# Patient Record
Sex: FEMALE | Race: BLACK OR AFRICAN AMERICAN | Employment: OTHER | URBAN - METROPOLITAN AREA
[De-identification: names, ages, dates, MRNs, and addresses within clinical notes are randomized per-mention and may not be internally consistent; named-entity substitution may affect disease eponyms.]

---

## 2017-07-31 PROBLEM — F33.9 RECURRENT MAJOR DEPRESSIVE DISORDER (HCC): Status: ACTIVE | Noted: 2017-07-31

## 2017-07-31 PROBLEM — F41.1 GENERALIZED ANXIETY DISORDER: Status: ACTIVE | Noted: 2017-07-31

## 2017-08-01 PROBLEM — K76.89 LIVER CYST: Status: ACTIVE | Noted: 2017-08-01

## 2017-08-31 ENCOUNTER — HOSPITAL ENCOUNTER (OUTPATIENT)
Dept: LAB | Age: 62
Discharge: HOME | End: 2017-08-31

## 2017-08-31 PROCEDURE — 88305 TISSUE EXAM BY PATHOLOGIST: CPT | Performed by: INTERNAL MEDICINE

## 2017-08-31 PROCEDURE — 88312 SPECIAL STAINS GROUP 1: CPT | Performed by: INTERNAL MEDICINE

## 2017-10-02 ENCOUNTER — HOSPITAL ENCOUNTER (OUTPATIENT)
Dept: LAB | Age: 62
Discharge: HOME | End: 2017-10-02
Attending: SURGERY
Payer: MEDICARE

## 2017-10-02 LAB
ALBUMIN SERPL-MCNC: 3.7 G/DL (ref 3.2–4.6)
ALBUMIN/GLOB SERPL: 1.1 {RATIO} (ref 1.2–3.5)
ALP SERPL-CCNC: 105 U/L (ref 50–136)
ALT SERPL-CCNC: 34 U/L (ref 12–65)
ANION GAP SERPL CALC-SCNC: 5 MMOL/L (ref 7–16)
APTT PPP: 28 SEC (ref 23.5–31.7)
AST SERPL-CCNC: 26 U/L (ref 15–37)
BILIRUB SERPL-MCNC: 0.8 MG/DL (ref 0.2–1.1)
BUN SERPL-MCNC: 9 MG/DL (ref 8–23)
CALCIUM SERPL-MCNC: 9.1 MG/DL (ref 8.3–10.4)
CHLORIDE SERPL-SCNC: 101 MMOL/L (ref 98–107)
CO2 SERPL-SCNC: 33 MMOL/L (ref 21–32)
CREAT SERPL-MCNC: 1.02 MG/DL (ref 0.6–1)
ERYTHROCYTE [DISTWIDTH] IN BLOOD BY AUTOMATED COUNT: 14.7 % (ref 11.9–14.6)
EST. AVERAGE GLUCOSE BLD GHB EST-MCNC: 114 MG/DL
FERRITIN SERPL-MCNC: 114 NG/ML (ref 8–388)
FOLATE SERPL-MCNC: 17.5 NG/ML (ref 3.1–17.5)
GLOBULIN SER CALC-MCNC: 3.5 G/DL (ref 2.3–3.5)
GLUCOSE SERPL-MCNC: 89 MG/DL (ref 65–100)
HBA1C MFR BLD: 5.6 % (ref 4.8–6)
HCT VFR BLD AUTO: 38.1 % (ref 35.8–46.3)
HGB BLD-MCNC: 12 G/DL (ref 11.7–15.4)
INR PPP: 1 (ref 0.9–1.2)
IRON SATN MFR SERPL: 12 %
IRON SERPL-MCNC: 36 UG/DL (ref 35–150)
MCH RBC QN AUTO: 25.4 PG (ref 26.1–32.9)
MCHC RBC AUTO-ENTMCNC: 31.5 G/DL (ref 31.4–35)
MCV RBC AUTO: 80.5 FL (ref 79.6–97.8)
PLATELET # BLD AUTO: 296 K/UL (ref 150–450)
PMV BLD AUTO: 9.7 FL (ref 10.8–14.1)
POTASSIUM SERPL-SCNC: 4 MMOL/L (ref 3.5–5.1)
PROT SERPL-MCNC: 7.2 G/DL (ref 6.3–8.2)
PROTHROMBIN TIME: 10.6 SEC (ref 9.6–12)
RBC # BLD AUTO: 4.73 M/UL (ref 4.05–5.25)
SODIUM SERPL-SCNC: 139 MMOL/L (ref 136–145)
TIBC SERPL-MCNC: 296 UG/DL (ref 250–450)
TSH SERPL DL<=0.005 MIU/L-ACNC: 1.05 UIU/ML (ref 0.36–3.74)
VIT B12 SERPL-MCNC: 3268 PG/ML (ref 193–986)
WBC # BLD AUTO: 8.9 K/UL (ref 4.3–11.1)

## 2017-10-02 PROCEDURE — 86677 HELICOBACTER PYLORI ANTIBODY: CPT | Performed by: SURGERY

## 2017-10-02 PROCEDURE — 85027 COMPLETE CBC AUTOMATED: CPT | Performed by: SURGERY

## 2017-10-02 PROCEDURE — 82652 VIT D 1 25-DIHYDROXY: CPT | Performed by: SURGERY

## 2017-10-02 PROCEDURE — 82607 VITAMIN B-12: CPT | Performed by: SURGERY

## 2017-10-02 PROCEDURE — 80323 ALKALOIDS NOS: CPT | Performed by: SURGERY

## 2017-10-02 PROCEDURE — 82728 ASSAY OF FERRITIN: CPT | Performed by: SURGERY

## 2017-10-02 PROCEDURE — 85730 THROMBOPLASTIN TIME PARTIAL: CPT | Performed by: SURGERY

## 2017-10-02 PROCEDURE — 82746 ASSAY OF FOLIC ACID SERUM: CPT | Performed by: SURGERY

## 2017-10-02 PROCEDURE — 80053 COMPREHEN METABOLIC PANEL: CPT | Performed by: SURGERY

## 2017-10-02 PROCEDURE — 36415 COLL VENOUS BLD VENIPUNCTURE: CPT | Performed by: SURGERY

## 2017-10-02 PROCEDURE — 84425 ASSAY OF VITAMIN B-1: CPT | Performed by: SURGERY

## 2017-10-02 PROCEDURE — 83540 ASSAY OF IRON: CPT | Performed by: SURGERY

## 2017-10-02 PROCEDURE — 83036 HEMOGLOBIN GLYCOSYLATED A1C: CPT | Performed by: SURGERY

## 2017-10-02 PROCEDURE — 85610 PROTHROMBIN TIME: CPT | Performed by: SURGERY

## 2017-10-02 PROCEDURE — 84443 ASSAY THYROID STIM HORMONE: CPT | Performed by: SURGERY

## 2017-10-04 LAB
1,25(OH)2D3 SERPL-MCNC: 48.9 PG/ML (ref 19.9–79.3)
H PYLORI AB SER QL: NEGATIVE

## 2017-10-05 LAB — VIT B1 BLD-SCNC: 130.8 NMOL/L (ref 66.5–200)

## 2017-10-08 LAB
COTININE SERPL-MCNC: NORMAL NG/ML
NICOTINE SERPL-MCNC: NORMAL NG/ML

## 2017-10-30 ENCOUNTER — HOSPITAL ENCOUNTER (OUTPATIENT)
Dept: GENERAL RADIOLOGY | Age: 62
Discharge: HOME | End: 2017-10-30
Attending: SURGERY
Payer: MEDICARE

## 2017-10-30 DIAGNOSIS — K25.9 GASTRIC ULCER, UNSPECIFIED CHRONICITY, UNSPECIFIED WHETHER GASTRIC ULCER HEMORRHAGE OR PERFORATION PRESENT: ICD-10-CM

## 2017-10-30 DIAGNOSIS — K21.9 HIATAL HERNIA WITH GERD: ICD-10-CM

## 2017-10-30 DIAGNOSIS — K44.9 HIATAL HERNIA WITH GERD: ICD-10-CM

## 2017-10-30 PROCEDURE — 74011000250 HC RX REV CODE- 250: Performed by: SURGERY

## 2017-10-30 PROCEDURE — 74011000255 HC RX REV CODE- 255: Performed by: SURGERY

## 2017-10-30 PROCEDURE — 74247 XR UPPER GI W KUB AIR CONT: CPT

## 2017-10-30 RX ADMIN — BARIUM SULFATE 355 ML: 0.6 SUSPENSION ORAL at 09:00

## 2017-10-30 RX ADMIN — ANTACID/ANTIFLATULENT 8 G: 380; 550; 10; 10 GRANULE, EFFERVESCENT ORAL at 08:54

## 2017-10-30 RX ADMIN — BARIUM SULFATE 700 MG: 700 TABLET ORAL at 09:02

## 2017-10-30 RX ADMIN — BARIUM SULFATE 135 ML: 980 POWDER, FOR SUSPENSION ORAL at 08:54

## 2017-11-02 ENCOUNTER — HOSPITAL ENCOUNTER (OUTPATIENT)
Dept: LAB | Age: 62
Discharge: HOME | End: 2017-11-02

## 2017-11-02 PROCEDURE — 88305 TISSUE EXAM BY PATHOLOGIST: CPT | Performed by: INTERNAL MEDICINE

## 2018-04-06 ENCOUNTER — HOSPITAL ENCOUNTER (OUTPATIENT)
Dept: SURGERY | Age: 63
Discharge: HOME | End: 2018-04-06
Attending: INTERNAL MEDICINE
Payer: MEDICARE

## 2018-04-06 VITALS
WEIGHT: 243.25 LBS | RESPIRATION RATE: 16 BRPM | OXYGEN SATURATION: 97 % | SYSTOLIC BLOOD PRESSURE: 119 MMHG | HEART RATE: 94 BPM | BODY MASS INDEX: 39.09 KG/M2 | TEMPERATURE: 97.5 F | HEIGHT: 66 IN | DIASTOLIC BLOOD PRESSURE: 68 MMHG

## 2018-04-06 LAB
ALBUMIN SERPL-MCNC: 3.6 G/DL (ref 3.2–4.6)
ALBUMIN/GLOB SERPL: 0.8 {RATIO} (ref 1.2–3.5)
ALP SERPL-CCNC: 92 U/L (ref 50–136)
ALT SERPL-CCNC: 26 U/L (ref 12–65)
ANION GAP SERPL CALC-SCNC: 10 MMOL/L (ref 7–16)
AST SERPL-CCNC: 28 U/L (ref 15–37)
BILIRUB SERPL-MCNC: 1.1 MG/DL (ref 0.2–1.1)
BUN SERPL-MCNC: 15 MG/DL (ref 8–23)
CALCIUM SERPL-MCNC: 9.2 MG/DL (ref 8.3–10.4)
CHLORIDE SERPL-SCNC: 103 MMOL/L (ref 98–107)
CO2 SERPL-SCNC: 28 MMOL/L (ref 21–32)
CREAT SERPL-MCNC: 1.07 MG/DL (ref 0.6–1)
ERYTHROCYTE [DISTWIDTH] IN BLOOD BY AUTOMATED COUNT: 14.8 % (ref 11.9–14.6)
EST. AVERAGE GLUCOSE BLD GHB EST-MCNC: 103 MG/DL
GLOBULIN SER CALC-MCNC: 4.4 G/DL (ref 2.3–3.5)
GLUCOSE SERPL-MCNC: 98 MG/DL (ref 65–100)
HBA1C MFR BLD: 5.2 % (ref 4.8–6)
HCT VFR BLD AUTO: 39.1 % (ref 35.8–46.3)
HGB BLD-MCNC: 12.3 G/DL (ref 11.7–15.4)
MCH RBC QN AUTO: 25.1 PG (ref 26.1–32.9)
MCHC RBC AUTO-ENTMCNC: 31.5 G/DL (ref 31.4–35)
MCV RBC AUTO: 79.8 FL (ref 79.6–97.8)
PLATELET # BLD AUTO: 294 K/UL (ref 150–450)
PMV BLD AUTO: 9.1 FL (ref 10.8–14.1)
POTASSIUM SERPL-SCNC: 3.9 MMOL/L (ref 3.5–5.1)
PROT SERPL-MCNC: 8 G/DL (ref 6.3–8.2)
RBC # BLD AUTO: 4.9 M/UL (ref 4.05–5.25)
SODIUM SERPL-SCNC: 141 MMOL/L (ref 136–145)
WBC # BLD AUTO: 8.1 K/UL (ref 4.3–11.1)

## 2018-04-06 PROCEDURE — 85027 COMPLETE CBC AUTOMATED: CPT | Performed by: SURGERY

## 2018-04-06 PROCEDURE — 83036 HEMOGLOBIN GLYCOSYLATED A1C: CPT | Performed by: SURGERY

## 2018-04-06 PROCEDURE — 80053 COMPREHEN METABOLIC PANEL: CPT | Performed by: SURGERY

## 2018-04-06 RX ORDER — BUDESONIDE AND FORMOTEROL FUMARATE DIHYDRATE 160; 4.5 UG/1; UG/1
AEROSOL RESPIRATORY (INHALATION)
Refills: 11 | COMMUNITY
Start: 2018-02-06 | End: 2019-05-06 | Stop reason: ALTCHOICE

## 2018-04-06 NOTE — PERIOP NOTES
Patient verified name, , and surgery as listed in [facility]. Type 2 surgery, walk in assessment complete. Labs per surgeon: cbc, cmp, Hgb A1c; results pending  Labs per anesthesia protocol: none;  EKG: none needed per protocol    Hibiclens and instructions given per hospital policy. Patient provided with and instructed on educational handouts including Guide to Surgery, Pain Management, Hand Hygiene, Blood Transfusion Education, and [facility] Brochure. Patient answered medical/surgical history questions at their best of ability. All prior to admission medications documented in [facility]. Original medication prescription bottle NOT visualized during patient appointment. Patient instructed to hold all vitamins 7 days prior to surgery and NSAIDS 5 days prior to surgery, patient verbalized understanding. Medications to be held: Aspirin and Diclofenac to be held for 5 days prior to surgery. Patient instructed to continue previous medications as prescribed prior to surgery and to take the following medications the day of surgery according to anesthesia guidelines with a small sip of water: use/bring Symbicort inhaler; take Hydroxyzine, if needed; Xanax, Cymbalta, Nexium, Claritin, Singulair, Venlafaxine. Patient reminded to bring non formulary Nexium. .     Patient teach back successful and patient demonstrates knowledge of instructions.

## 2018-04-12 ENCOUNTER — ANESTHESIA EVENT (OUTPATIENT)
Dept: SURGERY | Age: 63
End: 2018-04-12
Payer: MEDICARE

## 2018-04-13 ENCOUNTER — APPOINTMENT (OUTPATIENT)
Dept: INTERVENTIONAL RADIOLOGY/VASCULAR | Age: 63
End: 2018-04-13
Attending: SURGERY
Payer: MEDICARE

## 2018-04-13 ENCOUNTER — ANESTHESIA (OUTPATIENT)
Dept: SURGERY | Age: 63
End: 2018-04-13
Payer: MEDICARE

## 2018-04-13 ENCOUNTER — HOSPITAL ENCOUNTER (OUTPATIENT)
Age: 63
Setting detail: OUTPATIENT SURGERY
Discharge: HOME | End: 2018-04-13
Attending: SURGERY | Admitting: SURGERY
Payer: MEDICARE

## 2018-04-13 VITALS
RESPIRATION RATE: 16 BRPM | HEIGHT: 66 IN | SYSTOLIC BLOOD PRESSURE: 115 MMHG | WEIGHT: 246 LBS | OXYGEN SATURATION: 93 % | TEMPERATURE: 97.8 F | BODY MASS INDEX: 39.53 KG/M2 | DIASTOLIC BLOOD PRESSURE: 66 MMHG | HEART RATE: 83 BPM

## 2018-04-13 PROCEDURE — C1894 INTRO/SHEATH, NON-LASER: HCPCS

## 2018-04-13 PROCEDURE — C1769 GUIDE WIRE: HCPCS

## 2018-04-13 PROCEDURE — 76210000021 HC REC RM PH II 0.5 TO 1 HR: Performed by: SURGERY

## 2018-04-13 PROCEDURE — 76210000006 HC OR PH I REC 0.5 TO 1 HR: Performed by: SURGERY

## 2018-04-13 PROCEDURE — 76010000149 HC OR TIME 1 TO 1.5 HR: Performed by: SURGERY

## 2018-04-13 PROCEDURE — 74011250636 HC RX REV CODE- 250/636: Performed by: ANESTHESIOLOGY

## 2018-04-13 PROCEDURE — C1880 VENA CAVA FILTER: HCPCS

## 2018-04-13 PROCEDURE — C1887 CATHETER, GUIDING: HCPCS

## 2018-04-13 PROCEDURE — 76937 US GUIDE VASCULAR ACCESS: CPT

## 2018-04-13 PROCEDURE — 74011000250 HC RX REV CODE- 250

## 2018-04-13 PROCEDURE — 74011250636 HC RX REV CODE- 250/636: Performed by: SURGERY

## 2018-04-13 PROCEDURE — 76060000033 HC ANESTHESIA 1 TO 1.5 HR: Performed by: SURGERY

## 2018-04-13 PROCEDURE — 74011250636 HC RX REV CODE- 250/636

## 2018-04-13 PROCEDURE — 74011250637 HC RX REV CODE- 250/637: Performed by: ANESTHESIOLOGY

## 2018-04-13 PROCEDURE — 74011636320 HC RX REV CODE- 636/320: Performed by: SURGERY

## 2018-04-13 DEVICE — CELECT PLATINUM VENA CAVA FILTER SET FOR FEMORAL AND JUGULAR VEIN APPROACH
Type: IMPLANTABLE DEVICE | Site: VENA CAVA | Status: FUNCTIONAL
Brand: COOK CELECT

## 2018-04-13 RX ORDER — SODIUM CHLORIDE, SODIUM LACTATE, POTASSIUM CHLORIDE, CALCIUM CHLORIDE 600; 310; 30; 20 MG/100ML; MG/100ML; MG/100ML; MG/100ML
100 INJECTION, SOLUTION INTRAVENOUS CONTINUOUS
Status: DISCONTINUED | OUTPATIENT
Start: 2018-04-13 | End: 2018-04-13 | Stop reason: HOSPADM

## 2018-04-13 RX ORDER — ONDANSETRON 2 MG/ML
INJECTION INTRAMUSCULAR; INTRAVENOUS AS NEEDED
Status: DISCONTINUED | OUTPATIENT
Start: 2018-04-13 | End: 2018-04-13 | Stop reason: HOSPADM

## 2018-04-13 RX ORDER — LIDOCAINE HYDROCHLORIDE 10 MG/ML
0.1 INJECTION INFILTRATION; PERINEURAL AS NEEDED
Status: DISCONTINUED | OUTPATIENT
Start: 2018-04-13 | End: 2018-04-13 | Stop reason: HOSPADM

## 2018-04-13 RX ORDER — CEFAZOLIN SODIUM/WATER 2 G/20 ML
2 SYRINGE (ML) INTRAVENOUS ONCE
Status: COMPLETED | OUTPATIENT
Start: 2018-04-13 | End: 2018-04-13

## 2018-04-13 RX ORDER — HYDROCODONE BITARTRATE AND ACETAMINOPHEN 7.5; 325 MG/1; MG/1
1 TABLET ORAL AS NEEDED
Status: DISCONTINUED | OUTPATIENT
Start: 2018-04-13 | End: 2018-04-13 | Stop reason: HOSPADM

## 2018-04-13 RX ORDER — SUCCINYLCHOLINE CHLORIDE 20 MG/ML
INJECTION INTRAMUSCULAR; INTRAVENOUS AS NEEDED
Status: DISCONTINUED | OUTPATIENT
Start: 2018-04-13 | End: 2018-04-13 | Stop reason: HOSPADM

## 2018-04-13 RX ORDER — FENTANYL CITRATE 50 UG/ML
100 INJECTION, SOLUTION INTRAMUSCULAR; INTRAVENOUS ONCE
Status: DISCONTINUED | OUTPATIENT
Start: 2018-04-13 | End: 2018-04-13 | Stop reason: HOSPADM

## 2018-04-13 RX ORDER — PROPOFOL 10 MG/ML
INJECTION, EMULSION INTRAVENOUS AS NEEDED
Status: DISCONTINUED | OUTPATIENT
Start: 2018-04-13 | End: 2018-04-13 | Stop reason: HOSPADM

## 2018-04-13 RX ORDER — ROCURONIUM BROMIDE 10 MG/ML
INJECTION, SOLUTION INTRAVENOUS AS NEEDED
Status: DISCONTINUED | OUTPATIENT
Start: 2018-04-13 | End: 2018-04-13 | Stop reason: HOSPADM

## 2018-04-13 RX ORDER — SODIUM CHLORIDE 0.9 % (FLUSH) 0.9 %
5-10 SYRINGE (ML) INJECTION EVERY 8 HOURS
Status: DISCONTINUED | OUTPATIENT
Start: 2018-04-13 | End: 2018-04-13 | Stop reason: HOSPADM

## 2018-04-13 RX ORDER — NALOXONE HYDROCHLORIDE 0.4 MG/ML
0.1 INJECTION, SOLUTION INTRAMUSCULAR; INTRAVENOUS; SUBCUTANEOUS AS NEEDED
Status: DISCONTINUED | OUTPATIENT
Start: 2018-04-13 | End: 2018-04-13 | Stop reason: HOSPADM

## 2018-04-13 RX ORDER — OXYCODONE HYDROCHLORIDE 5 MG/1
5 TABLET ORAL
Status: DISCONTINUED | OUTPATIENT
Start: 2018-04-13 | End: 2018-04-13 | Stop reason: HOSPADM

## 2018-04-13 RX ORDER — MIDAZOLAM HYDROCHLORIDE 1 MG/ML
2 INJECTION, SOLUTION INTRAMUSCULAR; INTRAVENOUS
Status: DISCONTINUED | OUTPATIENT
Start: 2018-04-13 | End: 2018-04-13 | Stop reason: HOSPADM

## 2018-04-13 RX ORDER — HEPARIN SODIUM 5000 [USP'U]/ML
INJECTION, SOLUTION INTRAVENOUS; SUBCUTANEOUS AS NEEDED
Status: DISCONTINUED | OUTPATIENT
Start: 2018-04-13 | End: 2018-04-13 | Stop reason: HOSPADM

## 2018-04-13 RX ORDER — SODIUM CHLORIDE 9 MG/ML
25 INJECTION, SOLUTION INTRAVENOUS CONTINUOUS
Status: DISCONTINUED | OUTPATIENT
Start: 2018-04-13 | End: 2018-04-13 | Stop reason: HOSPADM

## 2018-04-13 RX ORDER — FENTANYL CITRATE 50 UG/ML
INJECTION, SOLUTION INTRAMUSCULAR; INTRAVENOUS AS NEEDED
Status: DISCONTINUED | OUTPATIENT
Start: 2018-04-13 | End: 2018-04-13 | Stop reason: HOSPADM

## 2018-04-13 RX ORDER — LIDOCAINE HYDROCHLORIDE 20 MG/ML
INJECTION, SOLUTION EPIDURAL; INFILTRATION; INTRACAUDAL; PERINEURAL AS NEEDED
Status: DISCONTINUED | OUTPATIENT
Start: 2018-04-13 | End: 2018-04-13 | Stop reason: HOSPADM

## 2018-04-13 RX ORDER — DEXAMETHASONE SODIUM PHOSPHATE 4 MG/ML
INJECTION, SOLUTION INTRA-ARTICULAR; INTRALESIONAL; INTRAMUSCULAR; INTRAVENOUS; SOFT TISSUE AS NEEDED
Status: DISCONTINUED | OUTPATIENT
Start: 2018-04-13 | End: 2018-04-13 | Stop reason: HOSPADM

## 2018-04-13 RX ORDER — HYDROMORPHONE HYDROCHLORIDE 2 MG/ML
0.5 INJECTION, SOLUTION INTRAMUSCULAR; INTRAVENOUS; SUBCUTANEOUS
Status: DISCONTINUED | OUTPATIENT
Start: 2018-04-13 | End: 2018-04-13 | Stop reason: HOSPADM

## 2018-04-13 RX ORDER — SODIUM CHLORIDE 0.9 % (FLUSH) 0.9 %
5-10 SYRINGE (ML) INJECTION AS NEEDED
Status: DISCONTINUED | OUTPATIENT
Start: 2018-04-13 | End: 2018-04-13 | Stop reason: HOSPADM

## 2018-04-13 RX ORDER — FAMOTIDINE 20 MG/1
20 TABLET, FILM COATED ORAL ONCE
Status: COMPLETED | OUTPATIENT
Start: 2018-04-13 | End: 2018-04-13

## 2018-04-13 RX ADMIN — HYDROMORPHONE HYDROCHLORIDE 0.5 MG: 2 INJECTION, SOLUTION INTRAMUSCULAR; INTRAVENOUS; SUBCUTANEOUS at 12:53

## 2018-04-13 RX ADMIN — FENTANYL CITRATE 100 MCG: 50 INJECTION, SOLUTION INTRAMUSCULAR; INTRAVENOUS at 11:37

## 2018-04-13 RX ADMIN — OXYCODONE HYDROCHLORIDE 5 MG: 5 TABLET ORAL at 14:18

## 2018-04-13 RX ADMIN — SODIUM CHLORIDE, SODIUM LACTATE, POTASSIUM CHLORIDE, AND CALCIUM CHLORIDE 100 ML/HR: 600; 310; 30; 20 INJECTION, SOLUTION INTRAVENOUS at 09:43

## 2018-04-13 RX ADMIN — LIDOCAINE HYDROCHLORIDE 60 MG: 20 INJECTION, SOLUTION EPIDURAL; INFILTRATION; INTRACAUDAL; PERINEURAL at 11:37

## 2018-04-13 RX ADMIN — FAMOTIDINE 20 MG: 20 TABLET ORAL at 09:44

## 2018-04-13 RX ADMIN — SUCCINYLCHOLINE CHLORIDE 140 MG: 20 INJECTION INTRAMUSCULAR; INTRAVENOUS at 11:37

## 2018-04-13 RX ADMIN — HYDROMORPHONE HYDROCHLORIDE 0.5 MG: 2 INJECTION, SOLUTION INTRAMUSCULAR; INTRAVENOUS; SUBCUTANEOUS at 13:01

## 2018-04-13 RX ADMIN — DEXAMETHASONE SODIUM PHOSPHATE 4 MG: 4 INJECTION, SOLUTION INTRA-ARTICULAR; INTRALESIONAL; INTRAMUSCULAR; INTRAVENOUS; SOFT TISSUE at 11:58

## 2018-04-13 RX ADMIN — ONDANSETRON 4 MG: 2 INJECTION INTRAMUSCULAR; INTRAVENOUS at 11:58

## 2018-04-13 RX ADMIN — PROPOFOL 200 MG: 10 INJECTION, EMULSION INTRAVENOUS at 11:37

## 2018-04-13 RX ADMIN — MIDAZOLAM HYDROCHLORIDE 2 MG: 1 INJECTION, SOLUTION INTRAMUSCULAR; INTRAVENOUS at 10:57

## 2018-04-13 RX ADMIN — Medication 2 G: at 11:44

## 2018-04-13 RX ADMIN — ROCURONIUM BROMIDE 5 MG: 10 INJECTION, SOLUTION INTRAVENOUS at 11:37

## 2018-04-13 NOTE — DISCHARGE INSTRUCTIONS
Vena Cava Filter Placement: What to Expect at [address] [facility]    A vena cava filter was put into the vena cava using a thin, flexible tube (catheter) that was inserted through a vein in your neck or groin. A vena cava filter helps prevent blood clots from traveling to the lungs and heart, where they may block blood flow. The filter may be permanent or it may be removed later. Vena cava filters may be used if you have problems taking a medicine (called a blood thinner) that prevents blood clots. After having a vena cava filter placed, you may feel tired and have some pain for several days. You may have a small bandage where the catheter was placed. This care sheet gives you a general idea about how long it will take for you to recover. But each person recovers at a different pace. Follow the steps below to feel better as quickly as possible. How can you care for yourself at home? Activity  ? · Take it easy for a day or two. Avoid strenuous activities, such as bicycle riding, jogging, weight lifting, or aerobic exercise, until your doctor says it is okay. Diet  ? · You can eat your normal diet. If your stomach is upset, try bland, low-fat foods like plain rice, broiled chicken, toast, and yogurt. ? · Drink plenty of fluids to avoid becoming dehydrated. Medicines  ? · Your doctor will tell you if and when you can restart your medicines. He or she will also give you instructions about taking any new medicines. ? · If you take blood thinners, such as warfarin (Coumadin), clopidogrel (Plavix), or aspirin, be sure to talk to your doctor. He or she will tell you if and when to start taking those medicines again. Make sure that you understand exactly what your doctor wants you to do. ? · Be safe with medicines. Take pain medicines exactly as directed. ¨ If the doctor gave you a prescription medicine for pain, take it as prescribed.   ¨ If you are not taking a prescription pain medicine, ask your doctor if you can take an over-the-counter medicine. ? · If you think your pain medicine is making you sick to your stomach:  ¨ Take your medicine after meals (unless your doctor has told you not to). ¨ Ask your doctor for a different pain medicine. ?Care of the catheter site  ? · Keep a bandage over the spot where the catheter was inserted for the first day, or for as long as your doctor recommends. ? · Put ice or a cold pack on the area for 10 to 20 minutes at a time to help with soreness or swelling. Do this every few hours. Put a thin cloth between the ice and your skin. Follow-up care is a key part of your treatment and safety. Be sure to make and go to all appointments, and call your doctor if you are having problems. It's also a good idea to know your test results and keep a list of the medicines you take. When should you call for help? Call 911 anytime you think you may need emergency care. For example, call if:  ? · You passed out (lost consciousness). ? · You have severe trouble breathing. ? · You have sudden chest pain and shortness of breath, or you cough up blood. ?Call your doctor now or seek immediate medical care if:  ? · You have pain that does not get better after you take pain medicine. ? · You are bleeding through your dressing. A small amount of bleeding is normal.   ? · You have a fast-growing, painful lump at the catheter site. ? · You have signs of infection, such as:  ¨ Increased pain, swelling, warmth, or redness. ¨ Red streaks leading from the incision. ¨ Pus draining from the incision. ¨ A fever. ? · You have symptoms of a blood clot in your leg, such as:  ¨ Pain in the calf, back of the knee, thigh, or groin. ¨ Redness and swelling in your leg or groin. ? Watch closely for any changes in your health, and be sure to contact your doctor if you have any problems.   After general anesthesia or intravenous sedation, for 24 hours or while taking prescription Narcotics:  · Limit your activities  · Do not drive and operate hazardous machinery  · Do not make important personal or business decisions  · Do  not drink alcoholic beverages  · If you have not urinated within 8 hours after discharge, please contact your surgeon on call. *  Please give a list of your current medications to your Primary Care Provider. *  Please update this list whenever your medications are discontinued, doses are      changed, or new medications (including over-the-counter products) are added. *  Please carry medication information at all times in case of emergency situations. These are general instructions for a healthy lifestyle:  No smoking/ No tobacco products/ Avoid exposure to second hand smoke  Surgeon General's Warning:  Quitting smoking now greatly reduces serious risk to your health. Obesity, smoking, and sedentary lifestyle greatly increases your risk for illness  A healthy diet, regular physical exercise & weight monitoring are important for maintaining a healthy lifestyle    You may be retaining fluid if you have a history of heart failure or if you experience any of the following symptoms:  Weight gain of 3 pounds or more overnight or 5 pounds in a week, increased swelling in our hands or feet or shortness of breath while lying flat in bed. Please call your doctor as soon as you notice any of these symptoms; do not wait until your next office visit. Recognize signs and symptoms of STROKE:  F-face looks uneven  A-arms unable to move or move unevenly  S-speech slurred or non-existent  T-time-call 911 as soon as signs and symptoms begin-DO NOT go       Back to bed or wait to see if you get better-TIME IS BRAIN.

## 2018-04-13 NOTE — IP AVS SNAPSHOT
[address] [address] [place] [address] 
[phone] Patient: Evita Lr MRN: [id number] About your hospitalization You were admitted on:  April 13, 2018 You last received care in the:  [facility] PACU You were discharged on:  April 13, 2018 Why you were hospitalized Your primary diagnosis was:  Not on File Follow-up Information Follow up With Details Comments Contact Info Reynaldo Colindres DO   [address] [facility] [phone] Sunny Sky MD  Please call and see if you need to schedule a follow-up appointment. [address] [facility] [address] 
[phone] Your Scheduled Appointments Tuesday April 17, 2018 GASTRECTOMY SLEEVE LAPAROSCOPIC with Heather Valdez MD  
[facility] ([address]) [address] [place] [address] [address] [place] [address]  
[phone] Thursday April 26, 2018  2:50 PM EDT  
GPO BAR with Heather Valdez MD  
[facility] ([address] [place] [address]) [address] [place] [id number] [phone] Monday May 07, 2018 11:00 AM EDT  
SFD PHONE ASSESSMENT with SFD PHONE APPOINTMENT [facility] Pre Assessment [facility] OR PRE ASSESSMENT) [address] [place] [address]  
[phone] Date/time displayed does not reflect when your phone assessment will be completed. Monday May 14, 2018 VENA CAVA FILTER INSERTION with Sunny Sky MD  
[facility] ([address] [place] [address]) [address]  
[phone] Wednesday May 16, 2018 10:00 AM EDT SHORT with Dilan Lucero MD  
[address]  
[phone] Thursday May 31, 2018  8:45 AM EDT SHORT with Benedicto Larkin NP  
 [address] [place] [address] [place] [address]  
[phone] Discharge Orders None A check josh indicates which time of day the medication should be taken. My Medications CHANGE how you take these medications Instructions Each Dose to Equal  
 Morning Noon Evening Bedtime  
 alendronate 70 mg tablet Commonly known as:  FOSAMAX What changed:  additional instructions Your last dose was: Your next dose is: Take 1 Tab by mouth every seven (7) days. Indications: POST-MENOPAUSAL OSTEOPOROSIS  
 70 mg  
    
   
   
   
  
 ALPRAZolam 1 mg tablet Commonly known as:  Leno Villareal What changed:  additional instructions Your last dose was: Your next dose is: Take 1 Tab by mouth two (2) times a day. Max Daily Amount: 2 mg. PER MH  Indications: anxiety 1 mg  
    
   
   
   
  
 atorvastatin 80 mg tablet Commonly known as:  LIPITOR What changed:  when to take this Your last dose was: Your next dose is: Take 1 Tab by mouth daily. Indications: hyperlipidemia 80 mg  
    
   
   
   
  
 cyclobenzaprine 10 mg tablet Commonly known as:  FLEXERIL What changed:   
- when to take this 
- reasons to take this Your last dose was: Your next dose is: Take 1 Tab by mouth three (3) times daily (with meals). Indications: Muscle Spasm  
 10 mg DULoxetine 60 mg capsule Commonly known as:  CYMBALTA What changed:  additional instructions Your last dose was: Your next dose is: Take 1 Cap by mouth two (2) times a day for 60 days. 60 mg  
    
   
   
   
  
 esomeprazole 40 mg capsule Commonly known as:  NexIUM What changed:  additional instructions Your last dose was: Your next dose is: Take 1 Cap by mouth daily. Indications: gastroesophageal reflux disease, Heartburn 40 mg  
    
   
   
   
  
 hydrOXYzine HCl 50 mg tablet Commonly known as:  ATARAX What changed:  additional instructions Your last dose was: Your next dose is: Take 1 Tab by mouth daily as needed for Anxiety. 1/2 to 1 qday for anxiety 50 mg  
    
   
   
   
  
 loratadine 10 mg tablet Commonly known as:  Mely Gannon What changed:   
- when to take this 
- reasons to take this 
- additional instructions Your last dose was: Your next dose is: Take 1 Tab by mouth daily. Indications: ALLERGIC RHINITIS 10 mg  
    
   
   
   
  
 montelukast 10 mg tablet Commonly known as:  SINGULAIR What changed:   
- when to take this 
- reasons to take this 
- additional instructions Your last dose was: Your next dose is: Take 1 Tab by mouth daily. Indications: MAINTENANCE THERAPY FOR ASTHMA 10 mg  
    
   
   
   
  
 traMADol 50 mg tablet Commonly known as:  ULTRAM  
What changed:   
- when to take this 
- reasons to take this Your last dose was: Your next dose is: Take 1 Tab by mouth four (4) times daily. Max Daily Amount: 200 mg.  
 50 mg CONTINUE taking these medications Instructions Each Dose to Equal  
 Morning Noon Evening Bedtime  
 aspirin 325 mg tablet Commonly known as:  ASPIRIN Your last dose was: Your next dose is: Take 325 mg by mouth daily. 325 mg  
    
   
   
   
  
 BIOTIN PO Your last dose was: Your next dose is: Take  by mouth daily. calcium-cholecalciferol (d3) 600 mg calcium- 200 unit Cap Your last dose was: Your next dose is: Take 2 Tabs by mouth daily. Indications: HYPOCALCEMIA PREVENTION, PREVENTION OF VITAMIN D DEFICIENCY  
 2 Tab diclofenac EC 75 mg EC tablet Commonly known as:  VOLTAREN Your last dose was: Your next dose is: Take 75 mg by mouth as needed. 75 mg  
    
   
   
   
  
 furosemide 20 mg tablet Commonly known as:  LASIX Your last dose was: Your next dose is: Take 1 Tab by mouth daily. PER Cardio  Indications: Edema 20 mg  
    
   
   
   
  
 gabapentin 300 mg capsule Commonly known as:  NEURONTIN Your last dose was: Your next dose is: Take 1 Cap by mouth nightly. Indications: NEUROPATHIC PAIN  
 300 mg Iron 325 mg (65 mg iron) tablet Generic drug:  ferrous sulfate Your last dose was: Your next dose is: Take  by mouth Daily (before breakfast). MAGNESIUM PO Your last dose was: Your next dose is: Take  by mouth.  
     
   
   
   
  
 multivitamin tablet Commonly known as:  ONE A DAY Your last dose was: Your next dose is: Take 1 Tab by mouth daily. 1 Tab  
    
   
   
   
  
 pramipexole 0.5 mg tablet Commonly known as:  MIRAPEX Your last dose was: Your next dose is: Take 0.5 mg by mouth nightly. Take / use AM day of surgery  per anesthesia protocols. Indications: FIBROMYALGIA  
 0.5 mg  
    
   
   
   
  
 SYMBICORT 160-4.5 mcg/actuation Hfaa Generic drug:  budesonide-formoterol Your last dose was: Your next dose is:    
   
   
 INHALE 2 PUFFS TWICE A DAY DURING THE DAY AND 2 PUFFS AT NIGHT; take day of surgery Venlafaxine 37.5 mg Tr24 Your last dose was: Your next dose is: Take 1 Tab by mouth daily. Indications: VASOMOTOR SYMPTOMS ASSOCIATED WITH MENOPAUSE  
 1 Tab VITAMIN C PO Your last dose was: Your next dose is: Take  by mouth daily. VITAMIN D3 1,000 unit tablet Generic drug:  cholecalciferol Your last dose was: Your next dose is: Take 1,000 Units by mouth daily. Indications: PREVENTION OF VITAMIN D DEFICIENCY  
 1000 Units  
    
   
   
   
  
 vitamin E 400 unit capsule Commonly known as:  Eddie Rodriguez 83 Your last dose was: Your next dose is: Take  by mouth daily. zolpidem 10 mg tablet Commonly known as:  AMBIEN Your last dose was: Your next dose is: Take 1 Tab by mouth nightly as needed for Sleep for up to 30 days. Max Daily Amount: 10 mg.  
 10 mg Opioid Education Prescription Opioids: What You Need to Know: 
 
Prescription opioids can be used to help relieve moderate-to-severe pain and are often prescribed following a surgery or injury, or for certain health conditions. These medications can be an important part of treatment but also come with serious risks. Opioids are strong pain medicines. Examples include hydrocodone, oxycodone, fentanyl, and morphine. Heroin is an example of an illegal opioid. It is important to work with your health care provider to make sure you are getting the safest, most effective care. WHAT ARE THE RISKS AND SIDE EFFECTS OF OPIOID USE? Prescription opioids carry serious risks of addiction and overdose, especially with prolonged use. An opioid overdose, often marked by slow breathing, can cause sudden death. The use of prescription opioids can have a number of side effects as well, even when taken as directed. · Tolerance-meaning you might need to take more of a medication for the same pain relief · Physical dependence-meaning you have symptoms of withdrawal when the medication is stopped. Withdrawal symptoms can include nausea, sweating, chills, diarrhea, stomach cramps, and muscle aches.   Withdrawal can last up to several weeks, depending on which drug you took and how long you took it. · Increased sensitivity to pain · Constipation · Nausea, vomiting, and dry mouth · Sleepiness and dizziness · Confusion · Depression · Low levels of testosterone that can result in lower sex drive, energy, and strength · Itching and sweating RISKS ARE GREATER WITH:      
· History of drug misuse, substance use disorder, or overdose · Mental health conditions (such as depression or anxiety) · Sleep apnea · Older age (72 years or older) · Pregnancy Avoid alcohol while taking prescription opioids. Also, unless specifically advised by your health care provider, medications to avoid include: · Benzodiazepines (such as Xanax or Valium) · Muscle relaxants (such as Soma or Flexeril) · Hypnotics (such as Ambien or Lunesta) · Other prescription opioids KNOW YOUR OPTIONS Talk to your health care provider about ways to manage your pain that don't involve prescription opioids. Some of these options may actually work better and have fewer risks and side effects. Options may include: 
· Pain relievers such as acetaminophen, ibuprofen, and naproxen · Some medications that are also used for depression or seizures · Physical therapy and exercise · Counseling to help patients learn how to cope better with triggers of pain and stress. · Application of heat or cold compress · Massage therapy · Relaxation techniques Be Informed Make sure you know the name of your medication, how much and how often to take it, and its potential risks & side effects. IF YOU ARE PRESCRIBED OPIOIDS FOR PAIN: 
· Never take opioids in greater amounts or more often than prescribed. Remember the goal is not to be pain-free but to manage your pain at a tolerable level. · Follow up with your primary care provider to: · Work together to create a plan on how to manage your pain. · Talk about ways to help manage your pain that don't involve prescription opioids. · Talk about any and all concerns and side effects. · Help prevent misuse and abuse. · Never sell or share prescription opioids · Help prevent misuse and abuse. · Store prescription opioids in a secure place and out of reach of others (this may include visitors, children, friends, and family). · Safely dispose of unused/unwanted prescription opioids: Find your community drug take-back program or your pharmacy mail-back program, or flush them down the toilet, following guidance from the Food and Drug Administration (www.fda.gov/Drugs/ResourcesForYou). · Visit www.cdc.gov/drugoverdose to learn about the risks of opioid abuse and overdose. · If you believe you may be struggling with addiction, tell your health care provider and ask for guidance or call [employer] at 3-029-644-GFRM. Discharge Instructions Vena Cava Filter Placement: What to Expect at Home Your Recovery A vena cava filter was put into the vena cava using a thin, flexible tube (catheter) that was inserted through a vein in your neck or groin. A vena cava filter helps prevent blood clots from traveling to the lungs and heart, where they may block blood flow. The filter may be permanent or it may be removed later. Vena cava filters may be used if you have problems taking a medicine (called a blood thinner) that prevents blood clots. After having a vena cava filter placed, you may feel tired and have some pain for several days. You may have a small bandage where the catheter was placed. This care sheet gives you a general idea about how long it will take for you to recover. But each person recovers at a different pace. Follow the steps below to feel better as quickly as possible. How can you care for yourself at home? Activity ? · Take it easy for a day or two. Avoid strenuous activities, such as bicycle riding, jogging, weight lifting, or aerobic exercise, until your doctor says it is okay. Diet ? · You can eat your normal diet. If your stomach is upset, try bland, low-fat foods like plain rice, broiled chicken, toast, and yogurt. ? · Drink plenty of fluids to avoid becoming dehydrated. Medicines ? · Your doctor will tell you if and when you can restart your medicines. He or she will also give you instructions about taking any new medicines. ? · If you take blood thinners, such as warfarin (Coumadin), clopidogrel (Plavix), or aspirin, be sure to talk to your doctor. He or she will tell you if and when to start taking those medicines again. Make sure that you understand exactly what your doctor wants you to do. ? · Be safe with medicines. Take pain medicines exactly as directed. ¨ If the doctor gave you a prescription medicine for pain, take it as prescribed. ¨ If you are not taking a prescription pain medicine, ask your doctor if you can take an over-the-counter medicine. ? · If you think your pain medicine is making you sick to your stomach: 
¨ Take your medicine after meals (unless your doctor has told you not to). ¨ Ask your doctor for a different pain medicine. ?Care of the catheter site ? · Keep a bandage over the spot where the catheter was inserted for the first day, or for as long as your doctor recommends. ? · Put ice or a cold pack on the area for 10 to 20 minutes at a time to help with soreness or swelling. Do this every few hours. Put a thin cloth between the ice and your skin. Follow-up care is a key part of your treatment and safety. Be sure to make and go to all appointments, and call your doctor if you are having problems. It's also a good idea to know your test results and keep a list of the medicines you take. When should you call for help? Call 911 anytime you think you may need emergency care. For example, call if: 
? · You passed out (lost consciousness). ? · You have severe trouble breathing. ? · You have sudden chest pain and shortness of breath, or you cough up blood. ?Call your doctor now or seek immediate medical care if: 
? · You have pain that does not get better after you take pain medicine. ? · You are bleeding through your dressing. A small amount of bleeding is normal.  
? · You have a fast-growing, painful lump at the catheter site. ? · You have signs of infection, such as: 
¨ Increased pain, swelling, warmth, or redness. ¨ Red streaks leading from the incision. ¨ Pus draining from the incision. ¨ A fever. ? · You have symptoms of a blood clot in your leg, such as: 
¨ Pain in the calf, back of the knee, thigh, or groin. ¨ Redness and swelling in your leg or groin. ? Watch closely for any changes in your health, and be sure to contact your doctor if you have any problems. After general anesthesia or intravenous sedation, for 24 hours or while taking prescription Narcotics: · Limit your activities · Do not drive and operate hazardous machinery · Do not make important personal or business decisions · Do  not drink alcoholic beverages · If you have not urinated within 8 hours after discharge, please contact your surgeon on call. *  Please give a list of your current medications to your Primary Care Provider. *  Please update this list whenever your medications are discontinued, doses are 
    changed, or new medications (including over-the-counter products) are added. *  Please carry medication information at all times in case of emergency situations. These are general instructions for a healthy lifestyle: No smoking/ No tobacco products/ Avoid exposure to second hand smoke Surgeon General's Warning:  Quitting smoking now greatly reduces serious risk to your health. Obesity, smoking, and sedentary lifestyle greatly increases your risk for illness A healthy diet, regular physical exercise & weight monitoring are important for maintaining a healthy lifestyle You may be retaining fluid if you have a history of heart failure or if you experience any of the following symptoms:  Weight gain of 3 pounds or more overnight or 5 pounds in a week, increased swelling in our hands or feet or shortness of breath while lying flat in bed. Please call your doctor as soon as you notice any of these symptoms; do not wait until your next office visit. Recognize signs and symptoms of STROKE: 
F-face looks uneven A-arms unable to move or move unevenly S-speech slurred or non-existent T-time-call 911 as soon as signs and symptoms begin-DO NOT go Back to bed or wait to see if you get better-TIME IS BRAIN. ACO Transitions of Care Introducing [employer] [address] Nan[address] Stuart offers a voluntary care coordination program to provide high quality service and care to [facility] fee-for-service beneficiaries. Grant Andrade was designed to help you enhance your health and well-being through the following services: ? Transitions of Care  support for individuals who are transitioning from one care setting to another (example: Hospital to home). ? Chronic and Complex Care Coordination  support for individuals and caregivers of those with serious or chronic illnesses or with more than one chronic (ongoing) condition and those who take a number of different medications. If you meet specific medical criteria, a [facility] [address] may call you directly to coordinate your care with your primary care physician and your other care providers. For questions about the [facility] [place] [facility] programs, please, contact your physicians office. For general questions or additional information about Accountable Care Organizations: Please visit www.medicare.gov/acos. html or call 1-800-MEDICARE (9-429.338.7910) TTY users should call 6-116.374.7513. Introducing [facility] & HEALTH SERVICES! [employer] introduces [employer] patient portal. Now you can access parts of your medical record, email your doctor's office, and request medication refills online. 1. In your internet browser, go to [email or web address] 2. Click on the First Time User? Click Here link in the Sign In box. You will see the New Member Sign Up page. 3. Enter your [employer] Access Code exactly as it appears below. You will not need to use this code after youve completed the sign-up process. If you do not sign up before the expiration date, you must request a new code. · [employer] Access Code: [email or web address] Expires: 6/28/2018  4:35 PM 
 
4. Enter the last four digits of your Social Security Number (xxxx) and Date of Birth (mm/dd/yyyy) as indicated and click Submit. You will be taken to the next sign-up page. 5. Create a [employer] ID. This will be your [employer] login ID and cannot be changed, so think of one that is secure and easy to remember. 6. Create a [employer] password. You can change your password at any time. 7. Enter your Password Reset Question and Answer. This can be used at a later time if you forget your password. 8. Enter your e-mail address. You will receive e-mail notification when new information is available in [address] 9. Click Sign Up. You can now view and download portions of your medical record. 10. Click the Download Summary menu link to download a portable copy of your medical information. If you have questions, please visit the Frequently Asked Questions section of the [employer] website. Remember, [employer] is NOT to be used for urgent needs. For medical emergencies, dial 911. Now available from your iPhone and Android! Introducing Fox Kenny As a [facility] [facility] patient, I wanted to make you aware of our electronic visit tool called Fox Kenny. [employer] allows you to connect within minutes with a medical provider 24 hours a day, seven days a week via a mobile device or tablet or logging into a secure website from your computer. You can access Fox Weaverfin from anywhere in the [place]. A virtual visit might be right for you when you have a simple condition and feel like you just dont want to get out of bed, or cant get away from work for an appointment, when your regular [facility] provider is not available (evenings, weekends or holidays), or when youre out of town and need minor care. Electronic visits cost only $49 and if the [employer] provider determines a prescription is needed to treat your condition, one can be electronically transmitted to a nearby pharmacy*. Please take a moment to enroll today if you have not already done so. The enrollment process is free and takes just a few minutes. To enroll, please download the [employer] liane to your tablet or phone, or visit [email or web address] to enroll on your computer. And, as an [address] [place] [address] patient with a [employer] account, the results of your visits will be scanned into your electronic medical record and your primary care provider will be able to view the scanned results. We urge you to continue to see your regular [facility] provider for your ongoing medical care. And while your primary care provider may not be the one available when you seek a [employer] Est[employer] virtual visit, the peace of mind you get from getting a real diagnosis real time can be priceless. For more information on Fox Franklynshir[employer], view our Frequently Asked Questions (FAQs) at [email or web address]. Sincerely, 
 
Alphonso Ríos MD 
Chief Medical Officer Domi Stuart *:  certain medications cannot be prescribed via [employer] Unresulted Labs-Please follow up with your PCP about these lab tests Order Current Status IR IVC FILTER In process Providers Seen During Your Hospitalization Provider Specialty Primary office phone Larisa Roberts MD Vascular Surgery [phone] Your Primary Care Physician (PCP) Primary Care Physician Office Phone Office Fax Nate Marmolejo [phone] You are allergic to the following Allergen Reactions Codeine Nausea and Vomiting Recent Documentation Height Weight BMI OB Status Smoking Status 1.676 m 111.6 kg 39.71 kg/m2 Hysterectomy Never Smoker Emergency Contacts Name Discharge Info Relation Home Work Mobile Roscoe Johnson  Child [2] [phone] Ramos Tijerina   [phone] Patient Belongings The following personal items are in your possession at time of discharge: 
  Dental Appliances: Uppers, Partials         Home Medications: None   Jewelry: None  Clothing: Shirt, Pants, Undergarments, Footwear, Socks    Other Valuables: None Please provide this summary of care documentation to your next provider. Signatures-by signing, you are acknowledging that this After Visit Summary has been reviewed with you and you have received a copy. Patient Signature:  ____________________________________________________________ Date:  ____________________________________________________________  
  
Olinda Parker Provider Signature:  ____________________________________________________________ Date:  ____________________________________________________________

## 2018-04-13 NOTE — ANESTHESIA PREPROCEDURE EVALUATION
Anesthetic History               Review of Systems / Medical History  Patient summary reviewed    Pulmonary        Sleep apnea    Asthma        Neuro/Psych              Cardiovascular    Hypertension              Exercise tolerance: >4 METS     GI/Hepatic/Renal     GERD: poorly controlled           Endo/Other        Obesity     Other Findings   Comments: Hx DVT with PE 2015           Physical Exam    Airway  Mallampati: II  TM Distance: > 6 cm  Neck ROM: normal range of motion   Mouth opening: Normal     Cardiovascular  Regular rate and rhythm,  S1 and S2 normal,  no murmur, click, rub, or gallop             Dental  No notable dental hx       Pulmonary  Breath sounds clear to auscultation               Abdominal         Other Findings            Anesthetic Plan    ASA: 3  Anesthesia type: general            Anesthetic plan and risks discussed with: Patient

## 2018-04-13 NOTE — PROCEDURES
[address] [facility] [employer] MORELIA Fontenot  MR#: [id number]  : 1955  ACCOUNT #: [de-identified]   DATE OF SERVICE: 2018    PREOPERATIVE DIAGNOSIS:  Recurrent deep venous thrombosis preoperative for bariatric surgery. POSTOPERATIVE DIAGNOSIS:  Recurrent deep venous thrombosis preoperative for bariatric surgery. PROCEDURE:  Temporary IVC filter placement. SURGEON:  Amirah Rodrigues MD    ANESTHESIA:  General endotracheal.    ESTIMATED BLOOD LOSS:  25 mL. DRAINS:  None. SPECIMENS:  None. TOTAL CONTRAST:  20 mL of Isovue 300. TOTAL FLUOROSCOPY TIME:  1.9 minutes. DESCRIPTION OF PROCEDURE:  The patient was brought to the angio suite, placed on the table in supine position. Following adequate general endotracheal anesthesia, a timeout procedure, the groins were draped and prepped in sterile fashion. The right common femoral vein was then percutaneously punctured under direct vision using ultrasound. A 5 Indian sheath was placed over a guidewire. A 5-Indian pigtail catheter was advanced into the IVC just above the confluence of the iliac veins. A venacavogram was performed from this position. Next, an 0.035 guidewire was advanced and the [facility] deployment sheath was advanced over the guidewire and positioned at the level of L2. The filter was then inserted through the sheath and deployed in the level of L2 which was the level of the confluence of the renal veins on the venacavagram.  The filter deployed in excellent position with no tilt. The delivery sheath was then removed and direct pressure was held until hemostasis was achieved. The patient then awakened from anesthesia and transferred to the recovery room in stable condition. The patient tolerated procedure well. No complications. IMPRESSION:  Satisfactory IVC filter placement.       Klaus Allen MD       [address] [place] [address] / [place]  D: 2018 13:41     T: 2018 17:28  JOB #: [id number]

## 2018-04-13 NOTE — ANESTHESIA POSTPROCEDURE EVALUATION
Post-Anesthesia Evaluation and Assessment    Patient: Capo Collins MRN: [id number]  SSN: [id number]    YOB: 1955  Age: 60 y.o. Sex: female       Cardiovascular Function/Vital Signs  Visit Vitals    /60 (BP 1 Location: Left arm, BP Patient Position: At rest)    Pulse 85    Temp 36.7 °C (98 °F)    Resp 17    Ht 5' 6\" (1.676 m)    Wt 111.6 kg (246 lb)    SpO2 97%    BMI 39.71 kg/m2       Patient is status post general anesthesia for Procedure(s):  VENA CAVA FILTER INSERTION. Nausea/Vomiting: None    Postoperative hydration reviewed and adequate. Pain:  Pain Scale 1: Numeric (0 - 10) (04/13/18 1326)  Pain Intensity 1: 2 (04/13/18 1326)   Managed    Neurological Status:   Neuro (WDL): Within Defined Limits (04/13/18 1326)  Neuro  LUE Motor Response: Purposeful (04/13/18 1326)  LLE Motor Response: Purposeful (04/13/18 1326)  RUE Motor Response: Purposeful (04/13/18 1326)  RLE Motor Response: Purposeful (04/13/18 1326)   At baseline    Mental Status and Level of Consciousness: Arousable    Pulmonary Status:   O2 Device: Room air (04/13/18 1326)   Adequate oxygenation and airway patent    Complications related to anesthesia: None    Post-anesthesia assessment completed.  No concerns    Signed By: Rogers Walls MD     April 13, 2018

## 2018-04-16 ENCOUNTER — ANESTHESIA EVENT (OUTPATIENT)
Dept: SURGERY | Age: 63
DRG: 621 | End: 2018-04-16
Payer: MEDICARE

## 2018-04-17 ENCOUNTER — HOSPITAL ENCOUNTER (INPATIENT)
Age: 63
LOS: 2 days | Discharge: HOME | DRG: 621 | End: 2018-04-19
Attending: SURGERY | Admitting: SURGERY
Payer: MEDICARE

## 2018-04-17 ENCOUNTER — ANESTHESIA (OUTPATIENT)
Dept: SURGERY | Age: 63
DRG: 621 | End: 2018-04-17
Payer: MEDICARE

## 2018-04-17 DIAGNOSIS — E66.01 MORBID OBESITY WITH BMI OF 40.0-44.9, ADULT (HCC): Primary | ICD-10-CM

## 2018-04-17 PROCEDURE — 74011000250 HC RX REV CODE- 250

## 2018-04-17 PROCEDURE — 77030011640 HC PAD GRND REM COVD -A: Performed by: SURGERY

## 2018-04-17 PROCEDURE — 65270000029 HC RM PRIVATE

## 2018-04-17 PROCEDURE — 74011250636 HC RX REV CODE- 250/636: Performed by: SURGERY

## 2018-04-17 PROCEDURE — 77030007956 HC PCH ENDOSC SPEC COVD -C: Performed by: SURGERY

## 2018-04-17 PROCEDURE — 77030035051: Performed by: SURGERY

## 2018-04-17 PROCEDURE — 88305 TISSUE EXAM BY PATHOLOGIST: CPT | Performed by: SURGERY

## 2018-04-17 PROCEDURE — 77030018836 HC SOL IRR NACL ICUM -A: Performed by: SURGERY

## 2018-04-17 PROCEDURE — 77030035045 HC TRCR ENDOSC VRSPRT BLDLSS COVD -B: Performed by: SURGERY

## 2018-04-17 PROCEDURE — 74011000258 HC RX REV CODE- 258: Performed by: SURGERY

## 2018-04-17 PROCEDURE — 74011000250 HC RX REV CODE- 250: Performed by: ANESTHESIOLOGY

## 2018-04-17 PROCEDURE — 76210000016 HC OR PH I REC 1 TO 1.5 HR: Performed by: SURGERY

## 2018-04-17 PROCEDURE — 77030002912 HC SUT ETHBND J&J -A: Performed by: SURGERY

## 2018-04-17 PROCEDURE — 94760 N-INVAS EAR/PLS OXIMETRY 1: CPT

## 2018-04-17 PROCEDURE — 77030039266 HC ADH SKN EXOFIN S2SG -A: Performed by: SURGERY

## 2018-04-17 PROCEDURE — 77030010507 HC ADH SKN DERMBND J&J -B: Performed by: SURGERY

## 2018-04-17 PROCEDURE — 74011250636 HC RX REV CODE- 250/636

## 2018-04-17 PROCEDURE — 74011250637 HC RX REV CODE- 250/637: Performed by: SURGERY

## 2018-04-17 PROCEDURE — C9113 INJ PANTOPRAZOLE SODIUM, VIA: HCPCS | Performed by: SURGERY

## 2018-04-17 PROCEDURE — 77030020782 HC GWN BAIR PAWS FLX 3M -B: Performed by: NURSE ANESTHETIST, CERTIFIED REGISTERED

## 2018-04-17 PROCEDURE — 77030027876 HC STPLR ENDOSC FLX PWR J&J -G1: Performed by: SURGERY

## 2018-04-17 PROCEDURE — 76010000161 HC OR TIME 1 TO 1.5 HR INTENSV-TIER 1: Performed by: SURGERY

## 2018-04-17 PROCEDURE — 0BQT4ZZ REPAIR DIAPHRAGM, PERCUTANEOUS ENDOSCOPIC APPROACH: ICD-10-PCS | Performed by: SURGERY

## 2018-04-17 PROCEDURE — 77030008703 HC TU ET UNCUF COVD -A: Performed by: NURSE ANESTHETIST, CERTIFIED REGISTERED

## 2018-04-17 PROCEDURE — 77030034156 HC DEV TISS ENSEAL G2 STR J&J -F: Performed by: SURGERY

## 2018-04-17 PROCEDURE — 74011250636 HC RX REV CODE- 250/636: Performed by: ANESTHESIOLOGY

## 2018-04-17 PROCEDURE — 88312 SPECIAL STAINS GROUP 1: CPT | Performed by: SURGERY

## 2018-04-17 PROCEDURE — 77030008756 HC TU IRR SUC STRY -B: Performed by: SURGERY

## 2018-04-17 PROCEDURE — 77030025088 HC APPL CLP LIG 1 COVD -B: Performed by: SURGERY

## 2018-04-17 PROCEDURE — 77030033269 HC SLV COMPR SCD KNE2 CARD -B: Performed by: SURGERY

## 2018-04-17 PROCEDURE — 77030008522 HC TBNG INSUF LAPRO STRY -B: Performed by: SURGERY

## 2018-04-17 PROCEDURE — 77030008603 HC TRCR ENDOSC EPATH J&J -C: Performed by: SURGERY

## 2018-04-17 PROCEDURE — 77030034208 HC SLV GASTRCTMY 3D CAL SYS DISP BOEH -C: Performed by: SURGERY

## 2018-04-17 PROCEDURE — 77030002933 HC SUT MCRYL J&J -A: Performed by: SURGERY

## 2018-04-17 PROCEDURE — 76060000033 HC ANESTHESIA 1 TO 1.5 HR: Performed by: SURGERY

## 2018-04-17 PROCEDURE — 77030035048 HC TRCR ENDOSC OPTCL COVD -B: Performed by: SURGERY

## 2018-04-17 PROCEDURE — 74011000250 HC RX REV CODE- 250: Performed by: SURGERY

## 2018-04-17 PROCEDURE — 0DB64Z3 EXCISION OF STOMACH, PERCUTANEOUS ENDOSCOPIC APPROACH, VERTICAL: ICD-10-PCS | Performed by: SURGERY

## 2018-04-17 PROCEDURE — 77030036598 HC CARTDRG STPL RELD ECHELON FLX J&J -D: Performed by: SURGERY

## 2018-04-17 RX ORDER — BUPIVACAINE HYDROCHLORIDE 5 MG/ML
INJECTION, SOLUTION EPIDURAL; INTRACAUDAL AS NEEDED
Status: DISCONTINUED | OUTPATIENT
Start: 2018-04-17 | End: 2018-04-17 | Stop reason: HOSPADM

## 2018-04-17 RX ORDER — MONTELUKAST SODIUM 10 MG/1
10 TABLET ORAL
Status: DISCONTINUED | OUTPATIENT
Start: 2018-04-17 | End: 2018-04-19 | Stop reason: HOSPADM

## 2018-04-17 RX ORDER — LIDOCAINE HYDROCHLORIDE 10 MG/ML
0.1 INJECTION INFILTRATION; PERINEURAL AS NEEDED
Status: DISCONTINUED | OUTPATIENT
Start: 2018-04-17 | End: 2018-04-17 | Stop reason: HOSPADM

## 2018-04-17 RX ORDER — OXYCODONE HYDROCHLORIDE 5 MG/1
5 TABLET ORAL
Status: DISCONTINUED | OUTPATIENT
Start: 2018-04-17 | End: 2018-04-17 | Stop reason: HOSPADM

## 2018-04-17 RX ORDER — SODIUM CHLORIDE AND POTASSIUM CHLORIDE .9; .15 G/100ML; G/100ML
100 SOLUTION INTRAVENOUS CONTINUOUS
Status: DISCONTINUED | OUTPATIENT
Start: 2018-04-17 | End: 2018-04-19

## 2018-04-17 RX ORDER — SODIUM CHLORIDE 0.9 % (FLUSH) 0.9 %
5-10 SYRINGE (ML) INJECTION AS NEEDED
Status: DISCONTINUED | OUTPATIENT
Start: 2018-04-17 | End: 2018-04-19 | Stop reason: HOSPADM

## 2018-04-17 RX ORDER — ROCURONIUM BROMIDE 10 MG/ML
INJECTION, SOLUTION INTRAVENOUS AS NEEDED
Status: DISCONTINUED | OUTPATIENT
Start: 2018-04-17 | End: 2018-04-17 | Stop reason: HOSPADM

## 2018-04-17 RX ORDER — PROPOFOL 10 MG/ML
INJECTION, EMULSION INTRAVENOUS AS NEEDED
Status: DISCONTINUED | OUTPATIENT
Start: 2018-04-17 | End: 2018-04-17 | Stop reason: HOSPADM

## 2018-04-17 RX ORDER — GABAPENTIN 300 MG/1
300 CAPSULE ORAL
Status: DISCONTINUED | OUTPATIENT
Start: 2018-04-17 | End: 2018-04-19 | Stop reason: HOSPADM

## 2018-04-17 RX ORDER — ACETAMINOPHEN 10 MG/ML
1000 INJECTION, SOLUTION INTRAVENOUS EVERY 6 HOURS
Status: COMPLETED | OUTPATIENT
Start: 2018-04-17 | End: 2018-04-17

## 2018-04-17 RX ORDER — DIPHENHYDRAMINE HYDROCHLORIDE 50 MG/ML
12.5 INJECTION, SOLUTION INTRAMUSCULAR; INTRAVENOUS
Status: DISCONTINUED | OUTPATIENT
Start: 2018-04-17 | End: 2018-04-19 | Stop reason: HOSPADM

## 2018-04-17 RX ORDER — DULOXETIN HYDROCHLORIDE 60 MG/1
60 CAPSULE, DELAYED RELEASE ORAL 2 TIMES DAILY
Status: DISCONTINUED | OUTPATIENT
Start: 2018-04-17 | End: 2018-04-19 | Stop reason: HOSPADM

## 2018-04-17 RX ORDER — CEFOXITIN 2 G/1
2 INJECTION, POWDER, FOR SOLUTION INTRAVENOUS EVERY 8 HOURS
Status: DISCONTINUED | OUTPATIENT
Start: 2018-04-17 | End: 2018-04-17 | Stop reason: SDUPTHER

## 2018-04-17 RX ORDER — BUDESONIDE AND FORMOTEROL FUMARATE DIHYDRATE 160; 4.5 UG/1; UG/1
2 AEROSOL RESPIRATORY (INHALATION) 2 TIMES DAILY
Status: DISCONTINUED | OUTPATIENT
Start: 2018-04-17 | End: 2018-04-19 | Stop reason: HOSPADM

## 2018-04-17 RX ORDER — HYDROMORPHONE HYDROCHLORIDE 2 MG/ML
0.5 INJECTION, SOLUTION INTRAMUSCULAR; INTRAVENOUS; SUBCUTANEOUS
Status: COMPLETED | OUTPATIENT
Start: 2018-04-17 | End: 2018-04-17

## 2018-04-17 RX ORDER — METOPROLOL TARTRATE 5 MG/5ML
1.25 INJECTION INTRAVENOUS
Status: DISCONTINUED | OUTPATIENT
Start: 2018-04-17 | End: 2018-04-19 | Stop reason: HOSPADM

## 2018-04-17 RX ORDER — APREPITANT 40 MG/1
40 CAPSULE ORAL
Status: COMPLETED | OUTPATIENT
Start: 2018-04-17 | End: 2018-04-17

## 2018-04-17 RX ORDER — DEXAMETHASONE SODIUM PHOSPHATE 4 MG/ML
INJECTION, SOLUTION INTRA-ARTICULAR; INTRALESIONAL; INTRAMUSCULAR; INTRAVENOUS; SOFT TISSUE AS NEEDED
Status: DISCONTINUED | OUTPATIENT
Start: 2018-04-17 | End: 2018-04-17 | Stop reason: HOSPADM

## 2018-04-17 RX ORDER — DEXTROSE 40 %
1 GEL (GRAM) ORAL AS NEEDED
Status: DISCONTINUED | OUTPATIENT
Start: 2018-04-17 | End: 2018-04-19 | Stop reason: HOSPADM

## 2018-04-17 RX ORDER — MIDAZOLAM HYDROCHLORIDE 1 MG/ML
2 INJECTION, SOLUTION INTRAMUSCULAR; INTRAVENOUS
Status: DISCONTINUED | OUTPATIENT
Start: 2018-04-17 | End: 2018-04-17 | Stop reason: HOSPADM

## 2018-04-17 RX ORDER — METOCLOPRAMIDE HYDROCHLORIDE 5 MG/ML
10 INJECTION INTRAMUSCULAR; INTRAVENOUS EVERY 6 HOURS
Status: DISCONTINUED | OUTPATIENT
Start: 2018-04-17 | End: 2018-04-19 | Stop reason: HOSPADM

## 2018-04-17 RX ORDER — HYDROMORPHONE HYDROCHLORIDE 2 MG/ML
1 INJECTION, SOLUTION INTRAMUSCULAR; INTRAVENOUS; SUBCUTANEOUS
Status: DISCONTINUED | OUTPATIENT
Start: 2018-04-17 | End: 2018-04-19 | Stop reason: HOSPADM

## 2018-04-17 RX ORDER — MIDAZOLAM HYDROCHLORIDE 1 MG/ML
2 INJECTION, SOLUTION INTRAMUSCULAR; INTRAVENOUS ONCE
Status: COMPLETED | OUTPATIENT
Start: 2018-04-17 | End: 2018-04-17

## 2018-04-17 RX ORDER — HYDROMORPHONE HYDROCHLORIDE 2 MG/ML
0.5 INJECTION, SOLUTION INTRAMUSCULAR; INTRAVENOUS; SUBCUTANEOUS
Status: DISCONTINUED | OUTPATIENT
Start: 2018-04-17 | End: 2018-04-19 | Stop reason: HOSPADM

## 2018-04-17 RX ORDER — KETOROLAC TROMETHAMINE 30 MG/ML
INJECTION, SOLUTION INTRAMUSCULAR; INTRAVENOUS AS NEEDED
Status: DISCONTINUED | OUTPATIENT
Start: 2018-04-17 | End: 2018-04-17 | Stop reason: HOSPADM

## 2018-04-17 RX ORDER — ONDANSETRON 2 MG/ML
4 INJECTION INTRAMUSCULAR; INTRAVENOUS
Status: DISCONTINUED | OUTPATIENT
Start: 2018-04-17 | End: 2018-04-17

## 2018-04-17 RX ORDER — ONDANSETRON 2 MG/ML
4 INJECTION INTRAMUSCULAR; INTRAVENOUS
Status: DISCONTINUED | OUTPATIENT
Start: 2018-04-17 | End: 2018-04-19 | Stop reason: HOSPADM

## 2018-04-17 RX ORDER — LORAZEPAM 2 MG/ML
1 INJECTION INTRAMUSCULAR
Status: DISCONTINUED | OUTPATIENT
Start: 2018-04-17 | End: 2018-04-19 | Stop reason: HOSPADM

## 2018-04-17 RX ORDER — SODIUM CHLORIDE 0.9 % (FLUSH) 0.9 %
5-10 SYRINGE (ML) INJECTION EVERY 8 HOURS
Status: DISCONTINUED | OUTPATIENT
Start: 2018-04-17 | End: 2018-04-19 | Stop reason: HOSPADM

## 2018-04-17 RX ORDER — SODIUM CHLORIDE, SODIUM LACTATE, POTASSIUM CHLORIDE, CALCIUM CHLORIDE 600; 310; 30; 20 MG/100ML; MG/100ML; MG/100ML; MG/100ML
INJECTION, SOLUTION INTRAVENOUS
Status: DISCONTINUED | OUTPATIENT
Start: 2018-04-17 | End: 2018-04-17 | Stop reason: HOSPADM

## 2018-04-17 RX ORDER — CEFAZOLIN SODIUM/WATER 2 G/20 ML
2 SYRINGE (ML) INTRAVENOUS
Status: COMPLETED | OUTPATIENT
Start: 2018-04-17 | End: 2018-04-17

## 2018-04-17 RX ORDER — ENALAPRILAT 1.25 MG/ML
1.25 INJECTION INTRAVENOUS
Status: DISCONTINUED | OUTPATIENT
Start: 2018-04-17 | End: 2018-04-19 | Stop reason: HOSPADM

## 2018-04-17 RX ORDER — DEXTROSE 50 % IN WATER (D50W) INTRAVENOUS SYRINGE
25-50 AS NEEDED
Status: DISCONTINUED | OUTPATIENT
Start: 2018-04-17 | End: 2018-04-19 | Stop reason: HOSPADM

## 2018-04-17 RX ORDER — FENTANYL CITRATE 50 UG/ML
INJECTION, SOLUTION INTRAMUSCULAR; INTRAVENOUS AS NEEDED
Status: DISCONTINUED | OUTPATIENT
Start: 2018-04-17 | End: 2018-04-17 | Stop reason: HOSPADM

## 2018-04-17 RX ORDER — NALOXONE HYDROCHLORIDE 0.4 MG/ML
0.4 INJECTION, SOLUTION INTRAMUSCULAR; INTRAVENOUS; SUBCUTANEOUS AS NEEDED
Status: DISCONTINUED | OUTPATIENT
Start: 2018-04-17 | End: 2018-04-19 | Stop reason: HOSPADM

## 2018-04-17 RX ORDER — ENOXAPARIN SODIUM 100 MG/ML
40 INJECTION SUBCUTANEOUS EVERY 24 HOURS
Status: DISCONTINUED | OUTPATIENT
Start: 2018-04-18 | End: 2018-04-19 | Stop reason: HOSPADM

## 2018-04-17 RX ORDER — ACETAMINOPHEN 10 MG/ML
1000 INJECTION, SOLUTION INTRAVENOUS ONCE
Status: COMPLETED | OUTPATIENT
Start: 2018-04-17 | End: 2018-04-17

## 2018-04-17 RX ORDER — ENOXAPARIN SODIUM 100 MG/ML
40 INJECTION SUBCUTANEOUS EVERY 24 HOURS
Status: DISCONTINUED | OUTPATIENT
Start: 2018-04-18 | End: 2018-04-17

## 2018-04-17 RX ORDER — SODIUM CHLORIDE, SODIUM LACTATE, POTASSIUM CHLORIDE, CALCIUM CHLORIDE 600; 310; 30; 20 MG/100ML; MG/100ML; MG/100ML; MG/100ML
75 INJECTION, SOLUTION INTRAVENOUS CONTINUOUS
Status: DISCONTINUED | OUTPATIENT
Start: 2018-04-17 | End: 2018-04-17 | Stop reason: HOSPADM

## 2018-04-17 RX ORDER — ENOXAPARIN SODIUM 100 MG/ML
40 INJECTION SUBCUTANEOUS
Status: COMPLETED | OUTPATIENT
Start: 2018-04-17 | End: 2018-04-17

## 2018-04-17 RX ORDER — GABAPENTIN 300 MG/1
300 CAPSULE ORAL ONCE
Status: DISCONTINUED | OUTPATIENT
Start: 2018-04-17 | End: 2018-04-17 | Stop reason: HOSPADM

## 2018-04-17 RX ORDER — LIDOCAINE HYDROCHLORIDE 20 MG/ML
INJECTION, SOLUTION EPIDURAL; INFILTRATION; INTRACAUDAL; PERINEURAL AS NEEDED
Status: DISCONTINUED | OUTPATIENT
Start: 2018-04-17 | End: 2018-04-17 | Stop reason: HOSPADM

## 2018-04-17 RX ORDER — NALOXONE HYDROCHLORIDE 0.4 MG/ML
0.2 INJECTION, SOLUTION INTRAMUSCULAR; INTRAVENOUS; SUBCUTANEOUS AS NEEDED
Status: DISCONTINUED | OUTPATIENT
Start: 2018-04-17 | End: 2018-04-17 | Stop reason: HOSPADM

## 2018-04-17 RX ORDER — VENLAFAXINE HYDROCHLORIDE 37.5 MG/1
37.5 CAPSULE, EXTENDED RELEASE ORAL
Status: DISCONTINUED | OUTPATIENT
Start: 2018-04-18 | End: 2018-04-19 | Stop reason: HOSPADM

## 2018-04-17 RX ORDER — SUCCINYLCHOLINE CHLORIDE 20 MG/ML
INJECTION INTRAMUSCULAR; INTRAVENOUS AS NEEDED
Status: DISCONTINUED | OUTPATIENT
Start: 2018-04-17 | End: 2018-04-17 | Stop reason: HOSPADM

## 2018-04-17 RX ORDER — ONDANSETRON 2 MG/ML
INJECTION INTRAMUSCULAR; INTRAVENOUS AS NEEDED
Status: DISCONTINUED | OUTPATIENT
Start: 2018-04-17 | End: 2018-04-17 | Stop reason: HOSPADM

## 2018-04-17 RX ORDER — KETOROLAC TROMETHAMINE 30 MG/ML
30 INJECTION, SOLUTION INTRAMUSCULAR; INTRAVENOUS
Status: DISCONTINUED | OUTPATIENT
Start: 2018-04-17 | End: 2018-04-19 | Stop reason: HOSPADM

## 2018-04-17 RX ADMIN — ACETAMINOPHEN 1000 MG: 10 INJECTION, SOLUTION INTRAVENOUS at 22:24

## 2018-04-17 RX ADMIN — HYDROMORPHONE HYDROCHLORIDE 0.5 MG: 2 INJECTION, SOLUTION INTRAMUSCULAR; INTRAVENOUS; SUBCUTANEOUS at 09:31

## 2018-04-17 RX ADMIN — MONTELUKAST SODIUM 10 MG: 10 TABLET, FILM COATED ORAL at 22:24

## 2018-04-17 RX ADMIN — KETOROLAC TROMETHAMINE 30 MG: 30 INJECTION, SOLUTION INTRAMUSCULAR at 13:33

## 2018-04-17 RX ADMIN — SODIUM CHLORIDE AND POTASSIUM CHLORIDE 100 ML/HR: 9; 1.49 INJECTION, SOLUTION INTRAVENOUS at 20:52

## 2018-04-17 RX ADMIN — ACETAMINOPHEN 1000 MG: 10 INJECTION, SOLUTION INTRAVENOUS at 08:45

## 2018-04-17 RX ADMIN — ONDANSETRON 4 MG: 2 INJECTION INTRAMUSCULAR; INTRAVENOUS at 11:16

## 2018-04-17 RX ADMIN — FENTANYL CITRATE 100 MCG: 50 INJECTION, SOLUTION INTRAMUSCULAR; INTRAVENOUS at 07:50

## 2018-04-17 RX ADMIN — SODIUM CHLORIDE, SODIUM LACTATE, POTASSIUM CHLORIDE, CALCIUM CHLORIDE: 600; 310; 30; 20 INJECTION, SOLUTION INTRAVENOUS at 07:45

## 2018-04-17 RX ADMIN — LIDOCAINE HYDROCHLORIDE 0.1 ML: 10 INJECTION, SOLUTION INFILTRATION; PERINEURAL at 07:00

## 2018-04-17 RX ADMIN — SUCCINYLCHOLINE CHLORIDE 180 MG: 20 INJECTION INTRAMUSCULAR; INTRAVENOUS at 07:50

## 2018-04-17 RX ADMIN — PROPOFOL 170 MG: 10 INJECTION, EMULSION INTRAVENOUS at 07:50

## 2018-04-17 RX ADMIN — DEXAMETHASONE SODIUM PHOSPHATE 10 MG: 4 INJECTION, SOLUTION INTRA-ARTICULAR; INTRALESIONAL; INTRAMUSCULAR; INTRAVENOUS; SOFT TISSUE at 07:50

## 2018-04-17 RX ADMIN — Medication 2 G: at 07:45

## 2018-04-17 RX ADMIN — SODIUM CHLORIDE, SODIUM LACTATE, POTASSIUM CHLORIDE, AND CALCIUM CHLORIDE 25 ML/HR: 600; 310; 30; 20 INJECTION, SOLUTION INTRAVENOUS at 07:00

## 2018-04-17 RX ADMIN — ONDANSETRON 4 MG: 2 INJECTION INTRAMUSCULAR; INTRAVENOUS at 18:04

## 2018-04-17 RX ADMIN — GABAPENTIN 300 MG: 300 CAPSULE ORAL at 22:24

## 2018-04-17 RX ADMIN — ROCURONIUM BROMIDE 5 MG: 10 INJECTION, SOLUTION INTRAVENOUS at 07:50

## 2018-04-17 RX ADMIN — ONDANSETRON 4 MG: 2 INJECTION INTRAMUSCULAR; INTRAVENOUS at 07:50

## 2018-04-17 RX ADMIN — SODIUM CHLORIDE 40 MG: 9 INJECTION INTRAMUSCULAR; INTRAVENOUS; SUBCUTANEOUS at 10:31

## 2018-04-17 RX ADMIN — HYDROMORPHONE HYDROCHLORIDE 1 MG: 2 INJECTION, SOLUTION INTRAMUSCULAR; INTRAVENOUS; SUBCUTANEOUS at 17:40

## 2018-04-17 RX ADMIN — ENOXAPARIN SODIUM 40 MG: 40 INJECTION SUBCUTANEOUS at 06:57

## 2018-04-17 RX ADMIN — MIDAZOLAM HYDROCHLORIDE 2 MG: 1 INJECTION, SOLUTION INTRAMUSCULAR; INTRAVENOUS at 07:01

## 2018-04-17 RX ADMIN — HYDROMORPHONE HYDROCHLORIDE 0.5 MG: 2 INJECTION, SOLUTION INTRAMUSCULAR; INTRAVENOUS; SUBCUTANEOUS at 09:15

## 2018-04-17 RX ADMIN — SODIUM CHLORIDE AND POTASSIUM CHLORIDE 100 ML/HR: 9; 1.49 INJECTION, SOLUTION INTRAVENOUS at 10:31

## 2018-04-17 RX ADMIN — CEFOXITIN SODIUM 2 G: 2 POWDER, FOR SOLUTION INTRAVENOUS at 20:57

## 2018-04-17 RX ADMIN — HYDROMORPHONE HYDROCHLORIDE 1 MG: 2 INJECTION, SOLUTION INTRAMUSCULAR; INTRAVENOUS; SUBCUTANEOUS at 11:18

## 2018-04-17 RX ADMIN — ACETAMINOPHEN 1000 MG: 10 INJECTION, SOLUTION INTRAVENOUS at 14:49

## 2018-04-17 RX ADMIN — METOCLOPRAMIDE 10 MG: 5 INJECTION, SOLUTION INTRAMUSCULAR; INTRAVENOUS at 20:54

## 2018-04-17 RX ADMIN — ROCURONIUM BROMIDE 30 MG: 10 INJECTION, SOLUTION INTRAVENOUS at 07:54

## 2018-04-17 RX ADMIN — LIDOCAINE HYDROCHLORIDE 100 MG: 20 INJECTION, SOLUTION EPIDURAL; INFILTRATION; INTRACAUDAL; PERINEURAL at 07:50

## 2018-04-17 RX ADMIN — KETOROLAC TROMETHAMINE 15 MG: 30 INJECTION, SOLUTION INTRAMUSCULAR; INTRAVENOUS at 08:45

## 2018-04-17 RX ADMIN — APREPITANT 40 MG: 40 CAPSULE ORAL at 06:59

## 2018-04-17 NOTE — IP AVS SNAPSHOT
[address] 9455 [facility] 
[phone] Patient: Edith Franco 
MRN: [id number] [id number] About your hospitalization You were admitted on:  April 17, 2018 You last received care in the:  [address] [place] [address] You were discharged on:  April 19, 2018 Why you were hospitalized Your primary diagnosis was: Morbid Obesity With Bmi Of 40.0-44.9, Adult (Hcc) Follow-up Information Follow up With Details Comments Contact Info Reynaldo JensDO   [address] [facility] [address] Your Scheduled Appointments Thursday April 26, 2018  2:50 PM EDT  
GPO BAR with Heather Valdez MD  
[place] [facility] ([address] [place] [address]) [address] [place] [id number] [phone] Monday May 07, 2018 11:00 AM EDT  
[facility] PHONE ASSESSMENT with [facility] PHONE APPOINTMENT [facility] Pre Assessment [facility] OR PRE ASSESSMENT) [address] [place] [address]  
[phone] Date/time displayed does not reflect when your phone assessment will be completed. Monday May 14, 2018 VENA CAVA FILTER INSERTION with Mitzy Abdi MD  
[facility] ([address] [place] [address]) [address] [place] [address]  
[phone] Wednesday May 16, 2018 10:00 AM EDT SHORT with Dilan Lucero MD  
[address] [place] [address] [facility]  
[phone] Thursday May 31, 2018  8:45 AM EDT SHORT with PABLO [address]) [address] [facility]  
[phone] Discharge Orders None A check josh indicates which time of day the medication should be taken. My Medications CHANGE how you take these medications Instructions Each Dose to Equal  
 Morning Noon Evening Bedtime  
 alendronate 70 mg tablet Commonly known as:  FOSAMAX What changed:  additional instructions Your last dose was: Your next dose is: Take 1 Tab by mouth every seven (7) days. Indications: POST-MENOPAUSAL OSTEOPOROSIS  
 70 mg  
    
   
   
   
  
 ALPRAZolam 1 mg tablet Commonly known as:  Mari Seals What changed:  additional instructions Your last dose was: Your next dose is: Take 1 Tab by mouth two (2) times a day. Max Daily Amount: 2 mg. PER MH  Indications: anxiety 1 mg  
    
   
   
   
  
 atorvastatin 80 mg tablet Commonly known as:  LIPITOR What changed:  when to take this Your last dose was: Your next dose is: Take 1 Tab by mouth daily. Indications: hyperlipidemia 80 mg  
    
   
   
   
  
 cyclobenzaprine 10 mg tablet Commonly known as:  FLEXERIL What changed:   
- when to take this 
- reasons to take this Your last dose was: Your next dose is: Take 1 Tab by mouth three (3) times daily (with meals). Indications: Muscle Spasm  
 10 mg DULoxetine 60 mg capsule Commonly known as:  CYMBALTA What changed:  additional instructions Your last dose was: Your next dose is: Take 1 Cap by mouth two (2) times a day for 60 days. 60 mg  
    
   
   
   
  
 esomeprazole 40 mg capsule Commonly known as:  NexIUM What changed:  additional instructions Your last dose was: Your next dose is: Take 1 Cap by mouth daily. Indications: gastroesophageal reflux disease, Heartburn 40 mg  
    
   
   
   
  
 hydrOXYzine HCl 50 mg tablet Commonly known as:  ATARAX What changed:  additional instructions Your last dose was: Your next dose is: Take 1 Tab by mouth daily as needed for Anxiety. 1/2 to 1 qday for anxiety 50 mg  
    
   
   
   
  
 loratadine 10 mg tablet Commonly known as:  Yeni Aguilar What changed:   
- when to take this 
- reasons to take this 
- additional instructions Your last dose was: Your next dose is: Take 1 Tab by mouth daily. Indications: ALLERGIC RHINITIS 10 mg  
    
   
   
   
  
 montelukast 10 mg tablet Commonly known as:  TANJAULAIR What changed:   
- when to take this 
- reasons to take this 
- additional instructions Your last dose was: Your next dose is: Take 1 Tab by mouth daily. Indications: MAINTENANCE THERAPY FOR ASTHMA 10 mg  
    
   
   
   
  
 traMADol 50 mg tablet Commonly known as:  ULTRAM  
What changed:   
- when to take this 
- reasons to take this Your last dose was: Your next dose is: Take 1 Tab by mouth four (4) times daily. Max Daily Amount: 200 mg.  
 50 mg CONTINUE taking these medications Instructions Each Dose to Equal  
 Morning Noon Evening Bedtime  
 aspirin 325 mg tablet Commonly known as:  ASPIRIN Your last dose was: Your next dose is: Take 325 mg by mouth daily. 325 mg  
    
   
   
   
  
 BIOTIN PO Your last dose was: Your next dose is: Take  by mouth daily. calcium-cholecalciferol (d3) 600 mg calcium- 200 unit Cap Your last dose was: Your next dose is: Take 2 Tabs by mouth daily. Indications: HYPOCALCEMIA PREVENTION, PREVENTION OF VITAMIN D DEFICIENCY  
 2 Tab  
    
   
   
   
  
 gabapentin 300 mg capsule Commonly known as:  NEURONTIN Your last dose was: Your next dose is: Take 1 Cap by mouth nightly. Indications: NEUROPATHIC PAIN  
 300 mg Iron 325 mg (65 mg iron) tablet Generic drug:  ferrous sulfate Your last dose was: Your next dose is: Take  by mouth Daily (before breakfast). MAGNESIUM PO Your last dose was: Your next dose is: Take  by mouth.  
     
   
   
   
  
 multivitamin tablet Commonly known as:  ONE A DAY Your last dose was: Your next dose is: Take 1 Tab by mouth daily. 1 Tab  
    
   
   
   
  
 pramipexole 0.5 mg tablet Commonly known as:  MIRAPEX Your last dose was: Your next dose is: Take 0.5 mg by mouth nightly. Take / use AM day of surgery  per anesthesia protocols. Indications: FIBROMYALGIA  
 0.5 mg  
    
   
   
   
  
 SYMBICORT 160-4.5 mcg/actuation Hfaa Generic drug:  budesonide-formoterol Your last dose was: Your next dose is:    
   
   
 INHALE 2 PUFFS TWICE A DAY DURING THE DAY AND 2 PUFFS AT NIGHT; take day of surgery Venlafaxine 37.5 mg Tr24 Your last dose was: Your next dose is: Take 1 Tab by mouth daily. Indications: VASOMOTOR SYMPTOMS ASSOCIATED WITH MENOPAUSE  
 1 Tab VITAMIN C PO Your last dose was: Your next dose is: Take  by mouth daily. VITAMIN D3 1,000 unit tablet Generic drug:  cholecalciferol Your last dose was: Your next dose is: Take 1,000 Units by mouth daily. Indications: PREVENTION OF VITAMIN D DEFICIENCY  
 1000 Units  
    
   
   
   
  
 vitamin E 400 unit capsule Commonly known as:  Avenbrian [address] Your last dose was: Your next dose is: Take  by mouth daily. zolpidem 10 mg tablet Commonly known as:  AMBIEN Your last dose was: Your next dose is: Take 1 Tab by mouth nightly as needed for Sleep for up to 30 days. Max Daily Amount: 10 mg.  
 10 mg  
    
   
   
   
  
  
STOP taking these medications   
 diclofenac EC 75 mg EC tablet Commonly known as:  VOLTAREN  
   
  
 furosemide 20 mg tablet Commonly known as:  LASIX Opioid Education Prescription Opioids: What You Need to Know: 
 
Prescription opioids can be used to help relieve moderate-to-severe pain and are often prescribed following a surgery or injury, or for certain health conditions. These medications can be an important part of treatment but also come with serious risks. Opioids are strong pain medicines. Examples include hydrocodone, oxycodone, fentanyl, and morphine. Heroin is an example of an illegal opioid. It is important to work with your health care provider to make sure you are getting the safest, most effective care. WHAT ARE THE RISKS AND SIDE EFFECTS OF OPIOID USE? Prescription opioids carry serious risks of addiction and overdose, especially with prolonged use. An opioid overdose, often marked by slow breathing, can cause sudden death. The use of prescription opioids can have a number of side effects as well, even when taken as directed. · Tolerance-meaning you might need to take more of a medication for the same pain relief · Physical dependence-meaning you have symptoms of withdrawal when the medication is stopped. Withdrawal symptoms can include nausea, sweating, chills, diarrhea, stomach cramps, and muscle aches. Withdrawal can last up to several weeks, depending on which drug you took and how long you took it. · Increased sensitivity to pain · Constipation · Nausea, vomiting, and dry mouth · Sleepiness and dizziness · Confusion · Depression · Low levels of testosterone that can result in lower sex drive, energy, and strength · Itching and sweating RISKS ARE GREATER WITH:      
· History of drug misuse, substance use disorder, or overdose · Mental health conditions (such as depression or anxiety) · Sleep apnea · Older age (72 years or older) · Pregnancy Avoid alcohol while taking prescription opioids. Also, unless specifically advised by your health care provider, medications to avoid include: · Benzodiazepines (such as Xanax or Valium) · Muscle relaxants (such as Soma or Flexeril) · Hypnotics (such as Ambien or Lunesta) · Other prescription opioids KNOW YOUR OPTIONS Talk to your health care provider about ways to manage your pain that don't involve prescription opioids. Some of these options may actually work better and have fewer risks and side effects. Options may include: 
· Pain relievers such as acetaminophen, ibuprofen, and naproxen · Some medications that are also used for depression or seizures · Physical therapy and exercise · Counseling to help patients learn how to cope better with triggers of pain and stress. · Application of heat or cold compress · Massage therapy · Relaxation techniques Be Informed Make sure you know the name of your medication, how much and how often to take it, and its potential risks & side effects. IF YOU ARE PRESCRIBED OPIOIDS FOR PAIN: 
· Never take opioids in greater amounts or more often than prescribed. Remember the goal is not to be pain-free but to manage your pain at a tolerable level. · Follow up with your primary care provider to: · Work together to create a plan on how to manage your pain. · Talk about ways to help manage your pain that don't involve prescription opioids. · Talk about any and all concerns and side effects. · Help prevent misuse and abuse. · Never sell or share prescription opioids · Help prevent misuse and abuse. · Store prescription opioids in a secure place and out of reach of others (this may include visitors, children, friends, and family).  
· Safely dispose of unused/unwanted prescription opioids: Find your community drug take-back program or your pharmacy mail-back program, or flush them down the toilet, following guidance from the Food and Drug Administration (www.fda.gov/Drugs/ResourcesForYou). · Visit www.cdc.gov/drugoverdose to learn about the risks of opioid abuse and overdose. · If you believe you may be struggling with addiction, tell your health care provider and ask for guidance or call Reilly Avendaño at 2-350-405-MEMU. Discharge Instructions 1. Liquid diet with two protein shakes per day until seen for first post-op visit. Try to get at least 60 grams of protein per day. 2. Showering is allowed, but no tub baths or swimming. 3. Drainage is common from the wounds. Change the dressings as needed. Call our office if the wounds become reddened, tender, feel warm to the touch or pus starts to drain from the wound. 4. Take prescribed pain medication as directed, usually Percocet, Dilaudid, Norco or Ultram. Take over the counter medication for minor pain. 5. Ice may be applied intermittently to the surgical site or sites. 6. Call or office, [phone], if problems arise. 7. Follow up in the office at the assigned time. 8. Resume all medications as taken per surgery, unless specifically instructed not to take certain ones. 9. No lifting more than 25 pounds until told otherwise. 10. Driving is allowed 3 days after surgery as long as you feel comfortable enough to drive and have not taken any prescription pain medication prior to driving. 11. Leave Steri-strips in place until seen in the office. If the strips start to curl up, fall off or begin to have an odor, then gently remove the strips. DISCHARGE SUMMARY from Nurse The following personal items are in your possession at time of discharge: 
 
Dental Appliances: Uppers Visual Aid: None Home Medications: None Jewelry: None Clothing: None Other Valuables: None (All personal items left with daughters) PATIENT INSTRUCTIONS: 
 
 After general anesthesia or intravenous sedation, for 24 hours or while taking prescription Narcotics: · Limit your activities · Do not drive and operate hazardous machinery · Do not make important personal or business decisions · Do  not drink alcoholic beverages · If you have not urinated within 8 hours after discharge, please contact your surgeon on call. Report the following to your surgeon: 
· Excessive pain, swelling, redness or odor of or around the surgical area · Temperature over 100.5 · Nausea and vomiting lasting longer than 4 hours or if unable to take medications · Any signs of decreased circulation or nerve impairment to extremity: change in color, persistent  numbness, tingling, coldness or increase pain · Any questions What to do at Home: 
Recommended activity: Activity as tolerated, per MD 
 
If you experience any of the following symptoms fever>101, pain unrelieved with medication, nausea/vomiting, shortness of breath, dizziness/fainting, chest pain. , please follow up with your doctor. *  Please give a list of your current medications to your Primary Care Provider. *  Please update this list whenever your medications are discontinued, doses are 
    changed, or new medications (including over-the-counter products) are added. *  Please carry medication information at all times in case of emergency situations. These are general instructions for a healthy lifestyle: No smoking/ No tobacco products/ Avoid exposure to second hand smoke Surgeon General's Warning:  Quitting smoking now greatly reduces serious risk to your health. Obesity, smoking, and sedentary lifestyle greatly increases your risk for illness A healthy diet, regular physical exercise & weight monitoring are important for maintaining a healthy lifestyle You may be retaining fluid if you have a history of heart failure or if you experience any of the following symptoms:  Weight gain of 3 pounds or more overnight or 5 pounds in a week, increased swelling in our hands or feet or shortness of breath while lying flat in bed. Please call your doctor as soon as you notice any of these symptoms; do not wait until your next office visit. Recognize signs and symptoms of STROKE: 
 
F-face looks uneven A-arms unable to move or move unevenly S-speech slurred or non-existent T-time-call 911 as soon as signs and symptoms begin-DO NOT go Back to bed or wait to see if you get better-TIME IS BRAIN. Warning Signs of HEART ATTACK Call 911 if you have these symptoms: 
? Chest discomfort. Most heart attacks involve discomfort in the center of the chest that lasts more than a few minutes, or that goes away and comes back. It can feel like uncomfortable pressure, squeezing, fullness, or pain. ? Discomfort in other areas of the upper body. Symptoms can include pain or discomfort in one or both arms, the back, neck, jaw, or stomach. ? Shortness of breath with or without chest discomfort. ? Other signs may include breaking out in a cold sweat, nausea, or lightheadedness. Don't wait more than five minutes to call [address]! Fast action can save your life. Calling 911 is almost always the fastest way to get lifesaving treatment. Emergency Medical Services staff can begin treatment when they arrive  up to an hour sooner than if someone gets to the hospital by car. The discharge information has been reviewed with the patient. The patient verbalized understanding. Discharge medications reviewed with the patient and appropriate educational materials and side effects teaching were provided. Sleeve Gastrectomy: What to Expect at [facility] Your Recovery Sleeve gastrectomy is surgery to remove part of the stomach to help with weight loss. The surgery limits the amount of food your stomach can hold. You will have some belly pain and may need pain medicine for the first week or so after surgery. The cuts (incisions) that the doctor made may be tender and sore. Because the surgery makes your stomach smaller, you will get full more quickly when you eat. It is important to think of this surgery as a tool to help you lose weight. It is not an instant fix. You will still need to eat a healthy diet and get regular exercise. This will help you reach your weight goal and avoid regaining the weight you lose. It is common to have many different emotions after this surgery. You may feel happy or excited as you begin to lose weight. But you may also feel overwhelmed or frustrated by the changes that you have to make in your diet, activity, and lifestyle. Talk with your doctor if you have concerns or questions. This care sheet gives you a general idea about how long it will take for you to recover. But each person recovers at a different pace. Follow the steps below to get better as quickly as possible. How can you care for yourself at home? Activity ? · Rest when you feel tired. Getting enough sleep will help you recover. ? · Try to walk each day. Start out by walking a little more than you did the day before. Bit by bit, increase the amount you walk. Walking boosts blood flow and helps prevent pneumonia and constipation. ? · Avoid lifting anything that would make you strain. This may include heavy grocery bags and milk containers, a heavy briefcase or backpack, cat litter or dog food bags, a vacuum , or a child. ? · Avoid strenuous activities, such as bicycle riding, jogging, weight lifting, or aerobic exercise, until your doctor says it is okay. Do not take part in any activity where you could be hit in the belly. This could be sports or playing with children. ? · Hold a pillow over your incision when you cough or take deep breaths. This will support your belly and decrease your pain. ? · Do breathing exercises at home as instructed by your doctor. This will help prevent pneumonia. ? · You can shower. Pat the incision dry. Do not take a bath for the first 2 weeks, or until your doctor tells you it is okay. ? · You may drive when you are no longer taking prescription pain medicine and can quickly move your foot from the gas pedal to the brake. You must also be able to sit comfortably for a long period of time, even if you do not plan to go far. You might get caught in traffic. ? · You will probably need to take 2 to 4 weeks off from work. It depends on the type of work you do and how you feel. ? · Ask your doctor when it is okay for you to have sex. Diet ? · For the first week, stay on a liquid or soft diet. This includes broths, soups, milk shakes, puddings, and mashed potatoes. When you can eat these without difficulty, try other soft, solid foods, such as ground meat, shredded chicken, fish, pasta, and well-cooked vegetables and canned fruits. ? · Have 5 or 6 small meals each day instead of 2 or 3 large meals. Chew each bite of food very well. Eat slowly. You may need to take 20 to 30 minutes to eat a meal.  
? · Avoid crusty breads, bagels, tough meats, raw vegetables, nuts and seeds (including crackers and breads that have nuts and seeds), and other foods that are hard to digest. If you feel full quickly, try to drink fluids between meals instead of with meals. ? · Avoid carbonated beverages, such as soda pop. ? · Avoid drinking with straws. This may help you swallow less air when you drink. ? · Gradually return to your normal foods. This usually takes 4 to 6 weeks. ? · Check with your doctor before drinking alcohol. Your body may absorb alcohol more quickly after surgery. ? · You may notice that your bowel movements are not regular right after your surgery. This is common.  Try to avoid constipation and straining with bowel movements. Take a fiber supplement every day. If you have not had a bowel movement after a couple of days, ask your doctor about taking a mild laxative. Medicines ? · Your doctor will tell you if and when you can restart your medicines. He or she will also give you instructions about taking any new medicines. ? · If you take blood thinners, such as warfarin (Coumadin), clopidogrel (Plavix), or aspirin, be sure to talk to your doctor. He or she will tell you if and when to start taking those medicines again. Make sure that you understand exactly what your doctor wants you to do. ? · Take pain medicines exactly as directed. ¨ If the doctor gave you a prescription medicine for pain, take it as prescribed. ¨ Do not take two or more pain medicines at the same time unless the doctor told you to. Many pain medicines contain acetaminophen, which is Tylenol. Too much acetaminophen (Tylenol) can be harmful. ¨ Do not take aspirin (Lewis, Bufferin), ibuprofen (Advil, Motrin), or naproxen (Aleve) until your doctor says it is okay. ? · If you think your pain medicine is making you sick to your stomach: 
¨ Take your medicine after meals (unless your doctor has told you not to). ¨ Ask your doctor for a different pain medicine. ? · If your doctor prescribed antibiotics, take them as directed. Do not stop taking them just because you feel better. You need to take the full course of antibiotics. Incision care ? · If you have strips of tape on the incision, leave the tape on for a week or until it falls off.  
? · Wash the area daily with warm, soapy water and pat it dry. Don't use hydrogen peroxide or alcohol, which can slow healing. You may cover the area with a gauze bandage if it weeps or rubs against clothing. Change the bandage every day. ? · Keep the area clean and dry. Follow-up care is a key part of your treatment and safety.  Be sure to make and go to all appointments, and call your doctor if you are having problems. It's also a good idea to know your test results and keep a list of the medicines you take. When should you call for help? Call 911 anytime you think you may need emergency care. For example, call if: 
? · You passed out (lost consciousness). ? · You are short of breath. ?Call your doctor now or seek immediate medical care if: 
? · You have pain that does not get better after you take pain medicine. ? · You cannot pass stool or gas. ? · You are sick to your stomach and cannot drink fluids. ? · You have loose stitches, or your incision comes open. ? · You have signs of a blood clot, such as: 
¨ Pain in your calf, back of the knee, thigh, or groin. ¨ Redness and swelling in your leg or groin. ? · You have signs of infection, such as: 
¨ Increased pain, swelling, warmth, or redness. ¨ Red streaks leading from the incision. ¨ Pus draining from the incision. ¨ A fever. ? Watch closely for changes in your health, and be sure to contact your doctor if you have any problems. Where can you learn more? Go to http://kristan-rahul.info/. Enter [id number] in the search box to learn more about \"Sleeve Gastrectomy: What to Expect at Home. \" Current as of: October 13, 2016 Content Version: 11.4 © 4505-1730 [employer]. Care instructions adapted under license by [employer] (which disclaims liability or warranty for this information). If you have questions about a medical condition or this instruction, always ask your healthcare professional. [place] [address] any warranty or liability for your use of this information. ACO Transitions of Care Introducing [employer] [address] offers a voluntary care coordination program to provide high quality service and care to [facility] fee-for-service beneficiaries. Mathew Mac was designed to help you enhance your health and well-being through the following services: ? Transitions of Care  support for individuals who are transitioning from one care setting to another (example: Hospital to home). ? Chronic and Complex Care Coordination  support for individuals and caregivers of those with serious or chronic illnesses or with more than one chronic (ongoing) condition and those who take a number of different medications. If you meet specific medical criteria, a [address] [place] [address] Rd may call you directly to coordinate your care with your primary care physician and your other care providers. For questions about the [facility] programs, please, contact your physicians office. For general questions or additional information about Accountable Care Organizations: 
Please visit www.medicare.gov/acos. html or call 1-800-MEDICARE (1-814.591.7404) TTY users should call 4-956.616.4624. Symphogen Announcement We are excited to announce that we are making your provider's discharge notes available to you in Symphogen. You will see these notes when they are completed and signed by the physician that discharged you from your recent hospital stay. If you have any questions or concerns about any information you see in Symphogen, please call the Health Information Department where you were seen or reach out to your Primary Care Provider for more information about your plan of care. Introducing \[facility] [place] [facility]\"" & HEALTH SERVICES! [facility] introduces Symphogen patient portal. Now you can access parts of your medical record, email your doctor's office, and request medication refills online. 1. In your internet browser, go to [email or web address] 2. Click on the First Time User? Click Here link in the Sign In box. You will see the New Member Sign Up page. 3. Enter your Symphogen Access Code exactly as it appears below.  You will not need to use this code after youve completed the sign-up process. If you do not sign up before the expiration date, you must request a new code. · Gondola Access Code: [email or web address] Expires: 6/28/2018  4:35 PM 
 
4. Enter the last four digits of your Social Security Number (xxxx) and Date of Birth (mm/dd/yyyy) as indicated and click Submit. You will be taken to the next sign-up page. 5. Create a Gondola ID. This will be your Gondola login ID and cannot be changed, so think of one that is secure and easy to remember. 6. Create a Gondola password. You can change your password at any time. 7. Enter your Password Reset Question and Answer. This can be used at a later time if you forget your password. 8. Enter your e-mail address. You will receive e-mail notification when new information is available in [address] 9. Click Sign Up. You can now view and download portions of your medical record. 10. Click the Download Summary menu link to download a portable copy of your medical information. If you have questions, please visit the Frequently Asked Questions section of the Gondola website. Remember, Gondola is NOT to be used for urgent needs. For medical emergencies, dial 911. Now available from your iPhone and Android! Introducing Fox Kenny As a [facility] patient, I wanted to make you aware of our electronic visit tool called Fxo Kenny. [facility] 24[facility]7 allows you to connect within minutes with a medical provider 24 hours a day, seven days a week via a mobile device or tablet or logging into a secure website from your computer. You can access Fox Kenny from anywhere in the [place].  
 
A virtual visit might be right for you when you have a simple condition and feel like you just dont want to get out of bed, or cant get away from work for an appointment, when your regular [facility] provider is not available (evenings, weekends or holidays), or when youre out of town and need minor care. Electronic visits cost only $49 and if the [facility]/[employer] provider determines a prescription is needed to treat your condition, one can be electronically transmitted to a nearby pharmacy*. Please take a moment to enroll today if you have not already done so. The enrollment process is free and takes just a few minutes. To enroll, please download the Celect liane to your tablet or phone, or visit [email or web address] to enroll on your computer. And, as an [address] [place] [address] patient with a pMDsoft account, the results of your visits will be scanned into your electronic medical record and your primary care provider will be able to view the scanned results. We urge you to continue to see your regular [facility] provider for your ongoing medical care. And while your primary care provider may not be the one available when you seek a Fonshirafin virtual visit, the peace of mind you get from getting a real diagnosis real time can be priceless. For more information on [employer], view our Frequently Asked Questions (FAQs) at [email or web address]. Sincerely, 
 
Marybeth Gutierrez MD 
Chief Medical Officer Dhruv8 Nanette Stuart *:  certain medications cannot be prescribed via [employer] Providers Seen During Your Hospitalization Provider Specialty Primary office phone Venkat Cuevass, [address] [facility] Surgery [phone] Your Primary Care Physician (PCP) Primary Care Physician Office Phone Office Fax Brent Agee [phone] You are allergic to the following Allergen Reactions Codeine Nausea and Vomiting Recent Documentation Height Weight BMI OB Status Smoking Status 1.676 m 110.3 kg 39.26 kg/m2 Hysterectomy Never Smoker Emergency Contacts Name Discharge Info Relation Home Work Mobile Roscoe Johnson  Child [2] [phone] [facility]   [phone] Patient Belongings The following personal items are in your possession at time of discharge: 
  Dental Appliances: Uppers  Visual Aid: None      Home Medications: None   Jewelry: None  Clothing: None    Other Valuables: None (All personal items left with daughters) Please provide this summary of care documentation to your next provider. Signatures-by signing, you are acknowledging that this After Visit Summary has been reviewed with you and you have received a copy. Patient Signature:  ____________________________________________________________ Date:  ____________________________________________________________  
  
Larri Hazard Provider Signature:  ____________________________________________________________ Date:  ____________________________________________________________

## 2018-04-17 NOTE — BRIEF OP NOTE
BRIEF OPERATIVE NOTE    Date of Procedure: 4/17/2018   Preoperative Diagnosis: MORBID OBESITY AND A HIATAL HERNIA  Postoperative Diagnosis: SAME   Procedure(s): LAPAROSCOPIC SLEEVE GASTRECTOMY AND  HIATAL HERNIA REPAIR  Surgeon(s) and Role:     * Gwyn Flores MD - Primary         Surgical Assistant: None    Surgical Staff:  Circ-1: Sherita Agustin RN  Scrub Tech-1: Francis Ordaz  Scrub Tech-2: Alan Golden  Event Time In   Incision Start 6579   Incision Close 0903     Anesthesia: General plus local  Estimated Blood Loss: 50 cc's  Specimens:   ID Type Source Tests Collected by Time Destination   1 : Portion of stomach Fresh Stomach  Gwyn Flores MD 4/17/2018 4276 Pathology      Findings: See dictated note   Complications: None  Implants: * No implants in log *

## 2018-04-17 NOTE — OP NOTES
[address] [place] [address] REPORT    Kristian Patches.  MR#: [id number]  : 1955  ACCOUNT #: [de-identified]   DATE OF SERVICE: 2018    PREOPERATIVE DIAGNOSIS:  Morbid obesity with a BMI of 41. POSTOPERATIVE DIAGNOSIS:  Morbid obesity with a BMI of 41 with a large hiatal hernia found. PROCEDURE:  Laparoscopic sleeve gastrectomy and hiatal hernia repair. SURGEON:  Shu Fox MD    ASSISTANT:  None. ANESTHESIA:  General endotracheal anesthesia with Dr. Maisha Ventura. ESTIMATED BLOOD LOSS:  50 mL. SPECIMENS:  Segment of stomach and sent to pathology. COMPLICATIONS:  None. IMPLANTS:  None. HISTORY:  This is a 60-year-old female who came to the [address] [place] [address] [facility] of her own volition. She was seen in the clinic, attended an information seminar, attended support group, met with our dietitian and a psychologist.  When she had cleared all these procedural hurdles, she was scheduled for surgery. We went through the risks and benefits with the patient. Risks of bleeding, infection, anesthesia, injury to the liver, spleen, stomach, small bowel, large bowel, pancreas, potential need to convert to an open procedure were mentioned. The biggest potential problems included a leak, as well as DVT and pulmonary emboli. For leak, I went through the potential for a leak along the staple line, which could lead to a second surgical procedure or stent placement. Leaks can lead to peritonitis, multisystem organ failure and even mortality. Also went through DVT, pulmonary emboli and what we do to try to prevent that, but that is always a possibility after this type of surgery. Other potential risks I mentioned were wound infection, urinary tract infection, pneumonia, incisional hernias, even myocardial infarction, stroke were mentioned, but very unlikely.   The patient was agreeable to all this, signed a consent form, was scheduled for 04/17/2018. Patient was seen in the preoperative area with her daughter then transported to room [facility]2 [facility].  She was placed on the operating table in the supine position where general endotracheal anesthesia was administered without complication. She received 2 g of Ancef as prophylactic antibiotic coverage. She had sequential compression devices placed. No Billy catheter was placed. The abdomen was prepped and draped in the usual sterile manner. I did a timeout identifying patient, surgical procedure and a birthdate of 1955. Once everyone in the room agreed with the time-out, I began the procedure. The patient received 2 g of Ancef as prophylactic antibiotic coverage. Once everyone agreed with the time-out, I made an incision superior to the patient's left of the umbilicus overlying the left rectus abdominis muscle. Through this incision, the Optiview trocar with a 10 mm 0 degree scope was inserted. It was placed in the peritoneal cavity. The peritoneal cavity was insufflated to 15 mmHg using carbon dioxide gas, after which a 10 mm 30 degree scope was inserted. Under direct vision, a 5 and 15 mm trocar were placed to the right of the midline in the right upper quadrant. A 5 mm trocar was placed in the epigastric region, was removed and a Nathansen liver retractor was placed through this tract, then used to retract the left lobe of the liver throughout the remainder of the procedure. The Felicia Muckle was attached to an Omni self-retaining retracting device. A 5 mm trocar was placed in the left upper quadrant, used by my assistant throughout the procedure. The patient was found to have a large hiatal hernia with the upper third of her stomach up in the chest.  I determined she would require a hiatal hernia repair at the same time as her sleeve gastrectomy. The nurse anesthetist placed a Visi-G along the lesser curvature.   It was then placed on suction decompressing the stomach. Came up with the EnSeal trio device from about 6 cm proximal to the pylorus all the way up to the left izabella of the diaphragm. I then dissected out the hiatal hernia with a combination of the EnSeal trio and some blunt dissection. The entire hernia sac was reduced back into the peritoneal cavity, leaving the GE junction in its normal position. We cleaned this, we cleared out the hiatal hernia sac circumferentially. I took pictures of the hiatal hernia before and after repair. We did a posterior repair with a figure-of-eight suture of 0 Ethibond, while the 40-Macanese Visi-G was in position. There was plenty of room around it to allow a bolus to be swallowed. Once this was done, we then did the sleeve gastrectomy. The first staple cartridge along the Visi-G was black, then followed by green, then 4 blues. This was done with the [place] 60 stapling device. It was black, green and then 4 blues to remove the greater curvature portion of the stomach. We checked along the staple line. We clipped some areas that were bleeding with a 10 mm clip applier. We used spot cautery on 20, to do spot cauterization of certain areas. The tube was removed. We checked the staple line for a second time. No evidence of any active bleeding was noted. I put a figure-of-eight suture in the staple line of the stomach. It was placed into a bodaplanes Endopouch and withdrawn through the 15 mm trocar site. The portion of stomach was removed and sent to pathology for evaluation. A 15 mm trocar was returned. We checked the staple line for a third time. Once again, no bleeding or evidence of leak was noted. Clips were in place along the staple line. The [facility] liver retractor was removed. There was bleeding from the site, when the [place] was removed. This was cauterized with the hook and the electrocautery. The 5 and the 15 mm trocar were removed. There was some bleeding from the 15 mm trocar.   This also had cautery was used to stop the bleeding. When no further bleeding was noted, we removed the 5 mm trocar in the left upper   quadrant, which was without evidence of bleeding. The Optiview trocar was removed. The wounds were irrigated. Hemostasis achieved with electrocautery. The 5 incision sites were closed with subcuticular sutures of 4-0 Monocryl. The patient received 30 mL of 0.5% Marcaine in divided dose of the 5 incision sites. The new Dermabond product was used as were Steri-Strips. The patient tolerated the procedure well and was hemodynamically stable throughout.       MD JESSE Meraz / GUANAKITO  D: 04/17/2018 09:40     T: 04/17/2018 11:51  JOB #: [id number]

## 2018-04-17 NOTE — ANESTHESIA PREPROCEDURE EVALUATION
Anesthetic History               Review of Systems / Medical History  Patient summary reviewed and pertinent labs reviewed    Pulmonary        Sleep apnea: CPAP    Asthma : well controlled       Neuro/Psych         Psychiatric history (Depression and anxiety)     Cardiovascular    Hypertension: well controlled              Exercise tolerance: >4 METS  Comments: Denies recent CP, SOB or changes in functional status  Nml ECHO and stress test in 2016   GI/Hepatic/Renal     GERD: well controlled      PUD and liver disease (Fatty)     Endo/Other        Morbid obesity and arthritis     Other Findings   Comments: Fibromyalgia  RLS  DVT           Physical Exam    Airway  Mallampati: II  TM Distance: 4 - 6 cm  Neck ROM: normal range of motion   Mouth opening: Normal     Cardiovascular    Rhythm: regular  Rate: normal         Dental    Dentition: Full upper dentures     Pulmonary  Breath sounds clear to auscultation               Abdominal  GI exam deferred       Other Findings            Anesthetic Plan    ASA: 3  Anesthesia type: general          Induction: Intravenous  Anesthetic plan and risks discussed with: Patient

## 2018-04-17 NOTE — PROGRESS NOTES
04/17/18 1354   Oxygen Therapy   O2 Sat (%) 92 %   Pulse via Oximetry 90 beats per minute   O2 Device Room air   O2 Flow Rate (L/min) 0 l/min   Good npc. Pt working on IS. Pt encouraged to do 10 breaths per hour while awake on IS. B/S clear. No respiratory distress noted at this time.

## 2018-04-17 NOTE — ANESTHESIA POSTPROCEDURE EVALUATION
Post-Anesthesia Evaluation and Assessment    Patient: Shari Fernandez MRN: [id number]  SSN: [id number]    YOB: 1955  Age: 60 y.o. Sex: female       Cardiovascular Function/Vital Signs  Visit Vitals    /63    Pulse 86    Temp 37 °C (98.6 °F)    Resp 20    Ht 5' 6\" (1.676 m)    Wt 110.3 kg (243 lb 4 oz)    SpO2 100%    BMI 39.26 kg/m2       Patient is status post general anesthesia for Procedure(s):  GASTRECTOMY SLEEVE LAPAROSCOPIC/ HIATAL HERNIA REPAIR. Nausea/Vomiting: None    Postoperative hydration reviewed and adequate. Pain:  Pain Scale 1: Visual (04/17/18 0950)  Pain Intensity 1: 0 (04/17/18 0950)   Managed    Neurological Status:   Neuro (WDL): Within Defined Limits (04/17/18 0944)  Neuro  Neurologic State: Drowsy (04/17/18 2915)  Orientation Level: Oriented to person;Oriented to place (04/17/18 0944)  LUE Motor Response: Purposeful (04/17/18 0944)  LLE Motor Response: Purposeful (04/17/18 0944)  RUE Motor Response: Purposeful (04/17/18 0944)  RLE Motor Response: Purposeful (04/17/18 0944)   At baseline    Mental Status and Level of Consciousness: Arousable    Pulmonary Status:   O2 Device: Nasal cannula (04/17/18 0917)   Adequate oxygenation and airway patent    Complications related to anesthesia: None    Post-anesthesia assessment completed.  No concerns    Signed By: Sumi Blake MD     April 17, 2018

## 2018-04-17 NOTE — PROGRESS NOTES
Surgery Addendum  Called by patient's nurse, Bradley Cespedes, about spitting up coffee ground material and having epigastric pain. Vital signs are stable. Plan: 1. Reglan to help with nausea and emptying of sleeve. 2. Limit ice chips and sips of water. 3. Start an antibiotic empiracally. 4. IVF'S  5. Pain control  6. Nausea control  7.  Swallow study in Anita Chin MD.

## 2018-04-17 NOTE — H&P (VIEW-ONLY)
History and Physical    Patient: Marky Hardin MRN: [id number]  N:     YOB: 1955  Age: 60 y.o. Sex: female              PCP: Toni Clements DO   Date:  3/27/2018        Marky Hardin returns to the [facility] after successful completion of our multidisciplinary surgical prep program.  This is her final consultation preparing her for a sleeve gastrectomy surgery. She presents with a height of 5' 5\" (1.651 m) and weight of 245 lb (111.1 kg), giving her a Body mass index is 40.77 kg/(m^2). She has an Ideal body weight of 150 lbs, and excess body weight of 95 lbs. She started our program with a weight of 259 lbs. She has completed all aspects of our prep program.    PSYCHOLOGICAL EVALUATION:   Completed with Luci Zuniga deeming her and appropriate surgical candidate. DIETITIAN EVALUATOIN:  Completed with Maria Isabel thomaseming her an appropriate surgical candidate. BARIATRIC LABS:  Completed 10/2/17  Component      Latest Ref Rng & Units 10/2/2017           4:10 PM   Sodium      136 - 145 mmol/L 139   Potassium      3.5 - 5.1 mmol/L 4.0   Chloride      98 - 107 mmol/L 101   CO2      21 - 32 mmol/L 33 (H)   Anion gap      7 - 16 mmol/L 5 (L)   Glucose      65 - 100 mg/dL 89   BUN      8 - 23 MG/DL 9   Creatinine      0.6 - 1.0 MG/DL 1.02 (H)   GFR est AA      >60 ml/min/1.73m2 >60   GFR est non-AA      >60 ml/min/1.73m2 58 (L)   Calcium      8.3 - 10.4 MG/DL 9.1   Bilirubin, total      0.2 - 1.1 MG/DL 0.8   ALT (SGPT)      12 - 65 U/L 34   AST      15 - 37 U/L 26   Alk.  phosphatase      50 - 136 U/L 105   Protein, total      6.3 - 8.2 g/dL 7.2   Albumin      3.2 - 4.6 g/dL 3.7   Globulin      2.3 - 3.5 g/dL 3.5   A-G Ratio      1.2 - 3.5   1.1 (L)   BUN/Creatinine ratio      IU/ml    EGFR      >60    EGFRAA      >60    WBC      4.3 - 11.1 K/uL 8.9   RBC      4.05 - 5.25 M/uL 4.73   HGB      11.7 - 15.4 g/dL 12.0   HCT      35.8 - 46.3 % 38.1   MCV      79.6 - 97.8 FL 80.5   MCH      26.1 - 32.9 PG 25.4 (L)   MCHC      31.4 - 35.0 g/dL 31.5   RDW      11.9 - 14.6 % 14.7 (H)   PLATELET      388 - 986 K/uL 296   MPV      10.8 - 14.1 FL 9.7 (L)   Cholesterol, total      50 - 200 mg/dL    Triglyceride      30 - 150 mg/dl    AHDL      40.00 - 60.00 mg/dL    LDL, calculated      0.0 - 130.0    VLDL      0 - 40    HDL Cholesterol          Iron      35 - 150 ug/dL 36   TIBC      250 - 450 ug/dL 296   Transferrin Saturation      >20 % 12 (L)   Prothrombin time      9.6 - 12.0 sec 10.6   INR      0.9 - 1.2   1.0   Nicotine      ng/mL None detected   Cotinine      ng/mL None detected   Hemoglobin A1c, (calculated)      4.8 - 6.0 % 5.6   Est. average glucose      mg/dL 114   aPTT      23.5 - 31.7 SEC 28.0   Vitamin B12      193 - 986 pg/mL 3268 (H)   Ferritin      8 - 388 NG/   Folate      3.1 - 17.5 ng/mL 17.5   H. pylori      NEG   NEGATIVE   TSH      0.358 - 3.740 uIU/mL 1.053   Vitamin B1      66.5 - 200.0 nmol/L 130.8   Calcitriol (Vit D 1, 25 di-OH)      19.9 - 79.3 pg/mL 48.9         PULMONARY CLEARANCE:  Completed 2/20/18 by Dr. Mey Lee at [address] [place] [address] GI:  Completed 10/30/17 which showed:  IMPRESSION:     1. Trace gastroesophageal reflux with small intermittent hiatal hernia.     2. Suboptimal evaluation of the duodenal bulb. EGD:  Completed 8/31/17 by Dr. Lisset Ramirez which was unremarkable except for a 5 mm Hiatal Hernia       We have reviewed the procedure again today and completed our standard pre op teaching. Her questions were answered and she is ready to schedule surgery. We have discussed the procedure, diet and exercise regimens, and potential complications of surgery. The patient understands the nature and potential complication of surgery and has the capacity to follow the post operative diet, exercise, and nutritional requirements. After review, she has signed her surgical consent today.       MEDICAL HISTORY:  The patient is followed by [facility] Evelina Rodas DO for problems including: Morbid Obesity  Sleep Apnea- uses CPAP    Hypertension- diet controlled  GERD - Protonix 40 mg & Nexium 40 mg  Hyperlipidemia  Osteoarthritis- knees & hips  Anxiety  Fibromyalgia  Lower extremity fluid retention  Restless leg syndrome  Sarcoidosis  Depression  Celiacs Disease  Osteoporosis  Pulmonary hypertension (mild)  Rheumatoid Arthritis  Plantar Fascitis  Insulin Resistance     Hx of DVT and pulmonary embolism in 2015. Hx of Klein's Esophagitis. She was re-evaluated for diabetes. All labs came back negative. She is no longer on daily Prednisone. She is on  mg & Lasix. She was hospitalized while [place] November 2016 for epigastric pain. She was found to have an irritated esophagus, gastric ulcers, a hiatal hernia, esophageal thickening and liver cysts were noted on a CT as an incidental finding. We asked pt to return to  Her PCP and complete a CT scan of her chest, abdomen and pelvis. The findings: small hiatal hernia, thickening of the distal most esophagus, splenic flexure diverticulosis, multiple scattered hypodense hepatic cysts or hamartomas. After this imaging, pt was referred to gastroenterology where she underwent an EGD by Dr. Иван Trejo on 8/31/17. His findings are as follows: 5 cm sliding hiatal hernia. He took multiple biopsies which the pathology diagnosis noted: fragments of gastric mucosa having changes of repair. DENTAL/CHEWING ABILITY:  Partial dentures     PRIOR WEIGHT LOSS ATTEMPTS:  Rich Caldwell, [facility] & MD supervised diets     EXERCISE ASSESSMENT:  Walks 4x/wk 45-60 mins        PSYCHOSOCIAL:  She notes she  is   and states main support person is her daughter, Bulmaro Santo. She is disabled due to her sarcoidosis. She lives with her two grandson's, ages 15 & 16. Her goal in pursuing surgical weight loss is to improve health. She was hospitalized for psychiatric purposes in [place] in 1993 following her divorce and the death of a grandchild. She denies any active psychological problems and reports appropriate treatment of  depression/anxiety. She states she is independent in her care, can drive a car, and can ambulate without assistance. HISTORY:  Past Medical History:   Diagnosis Date    Allergic rhinitis     Anxiety     Arthritis     osteo    Celiac disease     Depression     DVT (deep vein thrombosis) in pregnancy ([facility] [address]     Dysthymic disorder     Esophageal reflux     Exophthalmos     Fibromyalgia     GERD (gastroesophageal reflux disease)     Hiatal hernia     Hot flashes     Hypercholesterolemia     Hyperlipidemia     Hypertension     IBS (irritable bowel syndrome)     SABIHA (iron deficiency anemia)     Impaired fasting glucose     Lumbago     Malaise and fatigue     Morbid obesity (HCC)     Nonallopathic lesion of sacral region     KELLY (obstructive sleep apnea)     Osteoarthritis     Osteoporosis     Osteoporosis     Other speech disturbances     PUD (peptic ulcer disease)     Pulmonary embolism ([facility])     Pulmonary hypertension, mild     RA (rheumatoid arthritis) ([facility])     RLS (restless legs syndrome)     Sarcoidosis     Sciatic neuralgia     Tarsal tunnel syndrome     Urinary incontinence     Wears glasses     Wears partial dentures        She denies personal or family history of problems with anesthesia, bleeding. She has a history of DVT and pulmonary embolism in 2015. She denies dysphagia.       Past Surgical History:   Procedure Laterality Date    BREAST SURGERY PROCEDURE UNLISTED      Reduction    HX CHOLECYSTECTOMY      HX HYSTERECTOMY      HX ORTHOPAEDIC      right knee, right toe, right elbow, left wrist     Social History   Substance Use Topics    Smoking status: Never Smoker    Smokeless tobacco: Never Used    Alcohol use Yes      Comment: social     Family History   Problem Relation Age of Onset    Pneumonia Mother     Diabetes Father     Colon Cancer Other     Cancer Other      lung     Other Other      fam hx of liver        MEDICATIONS:  Current Outpatient Prescriptions   Medication Sig    hydrOXYzine HCl (ATARAX) 50 mg tablet Take 1 Tab by mouth daily as needed for Anxiety. 1/2 to 1 qday for anxiety    alendronate (FOSAMAX) 70 mg tablet Take 1 Tab by mouth every seven (7) days. Indications: POST-MENOPAUSAL OSTEOPOROSIS    atorvastatin (LIPITOR) 80 mg tablet Take 1 Tab by mouth daily. Indications: hyperlipidemia    Venlafaxine 37.5 mg tr24 Take 1 Tab by mouth daily. Indications: VASOMOTOR SYMPTOMS ASSOCIATED WITH MENOPAUSE    esomeprazole (NEXIUM) 40 mg capsule Take 1 Cap by mouth daily. Indications: gastroesophageal reflux disease, Heartburn    furosemide (LASIX) 20 mg tablet Take 1 Tab by mouth daily. PER Cardio  Indications: Edema    traMADol (ULTRAM) 50 mg tablet Take 1 Tab by mouth four (4) times daily. Max Daily Amount: 200 mg.    gabapentin (NEURONTIN) 300 mg capsule Take 1 Cap by mouth nightly. Indications: NEUROPATHIC PAIN    cyclobenzaprine (FLEXERIL) 10 mg tablet Take 1 Tab by mouth three (3) times daily (with meals). Indications: Muscle Spasm    loratadine (CLARITIN) 10 mg tablet Take 1 Tab by mouth daily. Indications: ALLERGIC RHINITIS    montelukast (SINGULAIR) 10 mg tablet Take 1 Tab by mouth daily. Indications: MAINTENANCE THERAPY FOR ASTHMA    glucose blood VI test strips (BLOOD GLUCOSE TEST) strip by Does Not Apply route daily for 360 days. Not on insulin R73.09 Elevated glucose. Pharmacist may substitute any brand covered by insurance.  Blood-Glucose Meter monitoring kit (R73.09) Elevated glucose  (primary encounter diagnosis)    diclofenac EC (VOLTAREN) 75 mg EC tablet     rOPINIRole (REQUIP) 0.5 mg tablet     calcium-cholecalciferol, d3, 600 mg calcium- 200 unit cap Take 2 Tabs by mouth daily.  fluticasone (FLONASE) 50 mcg/actuation nasal spray 2 Sprays by Both Nostrils route two (2) times a day.     multivitamin (ONE A DAY) tablet Take 1 Tab by mouth daily.  cholecalciferol (VITAMIN D3) 1,000 unit tablet Take 1,000 Units by mouth. No current facility-administered medications for this visit. ALLERGIES:  Allergies   Allergen Reactions    Codeine Nausea and Vomiting       ROS: The patient has no difficulty with chest pain or shortness of breath. No fever or chills. Comprehensive 13 point review of systems was otherwise unremarkable except as noted above. PHYSICAL EXAM:   Visit Vitals    /90    Pulse (!) 106    Ht 5' 5\" (1.651 m)    Wt 245 lb (111.1 kg)    BMI 40.77 kg/m2       General: Alert oriented cooperative black female in no acute distress. Eyes: Sclera are clear. Conjunctiva and lids within normal limits. No icterus. Ears and Nose: no gross deformities to visual inspection, gross hearing intact  Neck: Supple, trachea midline, no appreciable thyromegaly  Resp: No JVD. Breathing is  non-labored. Lungs clear to auscultation without wheezing or rhonchi   CV: RRR. No murmurs, rubs or gallops appreciated, Carotid bruits are absent  Abd: soft non-tender and non-distended without peritoneal signs. +bs    Psych:  Mood and affect appropriate. Short-term memory and understanding intact      ASSESSMENT:  Morbid obesity with a  Body mass index is 40.77 kg/(m^2). ready for sleeve gastrectomy with a possible hiatal hernia repair. PLAN:  1.  Schedule for laparoscopic, possible open, sleeve gastrectomy, in the near future. 2.  Patient will complete our 2 week pre operative diet. 3.  Bring CPAP and incentive spirometer( given with our standard instruction to use BID 2 weeks prior to surgery) to hospital on day of surgery. 4.  The patient received prescription to use after surgery for :  Percocet & Phenergan. She will remain on her Protonix and Nexium. 5.  Off ASA 7 days prior to surgery.   6.  IVC Filter placement prior to surgery - a referral was sent to Dr. Javi Nelson @ [facility] (Hx of DVT & PE)      I went over the risks and benefits with the patient. For risks I went over problems with bleeding, infection and anesthesia. I also noted potential problems of injury to the esophagus, liver, spleen, stomach, pancreas, small bowel and or colon. I addition, I discussed potential problems of DVT/pulmonary embolism, urinary tract infection, wound infection, myocardial infarction, stroke and the potential need to convert to an open procedure. I quoted a 1% nationwide mortality rate for this procedure. She has signed our extensive consent form which is in the chart.      Velia Romero MD    Date:  3/27/2018

## 2018-04-17 NOTE — PROGRESS NOTES
Admission assessment . Pt is A/O x 4  . Pt was ready to get up and void when she got to the room . Lungs clear and respirations even and unlabored. S1 & S2 auscultated, heart rate regular. Abdomen is soft and tender to touch , BS hypoactive . 5 ps with steri -strips intact . Iv patent and infusing . Oriented pt to room and call light . Educated pt on IS and how often to use it . Educated on ice only at this time .  Call light in reach and family at bedside

## 2018-04-17 NOTE — PROGRESS NOTES
Pt called nurse to room \" states she is having some upper abdominal pain and that its different than earlier and its radiating to her back \" . Then pt states she is slightly nauseous  and that she has been spitting up so phlegm, pt has been spitting in a tissue . Nurse assessed sputum and its all dark/coffee ground in color  . Vitals 126/83 , pulse 100, sat 98 % .  Notified Dr. [place] @ [phone] , will continue to monitor

## 2018-04-18 ENCOUNTER — APPOINTMENT (OUTPATIENT)
Dept: GENERAL RADIOLOGY | Age: 63
DRG: 621 | End: 2018-04-18
Attending: SURGERY
Payer: MEDICARE

## 2018-04-18 LAB
ANION GAP SERPL CALC-SCNC: 11 MMOL/L (ref 7–16)
BUN SERPL-MCNC: 13 MG/DL (ref 8–23)
CALCIUM SERPL-MCNC: 8.5 MG/DL (ref 8.3–10.4)
CHLORIDE SERPL-SCNC: 102 MMOL/L (ref 98–107)
CO2 SERPL-SCNC: 25 MMOL/L (ref 21–32)
CREAT SERPL-MCNC: 1.12 MG/DL (ref 0.6–1)
ERYTHROCYTE [DISTWIDTH] IN BLOOD BY AUTOMATED COUNT: 14.6 % (ref 11.9–14.6)
GLUCOSE SERPL-MCNC: 122 MG/DL (ref 65–100)
HCT VFR BLD AUTO: 38.2 % (ref 35.8–46.3)
HGB BLD-MCNC: 12.2 G/DL (ref 11.7–15.4)
MCH RBC QN AUTO: 25.6 PG (ref 26.1–32.9)
MCHC RBC AUTO-ENTMCNC: 31.9 G/DL (ref 31.4–35)
MCV RBC AUTO: 80.1 FL (ref 79.6–97.8)
PLATELET # BLD AUTO: 288 K/UL (ref 150–450)
PMV BLD AUTO: 9.6 FL (ref 10.8–14.1)
POTASSIUM SERPL-SCNC: 4.2 MMOL/L (ref 3.5–5.1)
RBC # BLD AUTO: 4.77 M/UL (ref 4.05–5.25)
SODIUM SERPL-SCNC: 138 MMOL/L (ref 136–145)
WBC # BLD AUTO: 11.5 K/UL (ref 4.3–11.1)

## 2018-04-18 PROCEDURE — 74011636320 HC RX REV CODE- 636/320: Performed by: SURGERY

## 2018-04-18 PROCEDURE — 65270000029 HC RM PRIVATE

## 2018-04-18 PROCEDURE — C9113 INJ PANTOPRAZOLE SODIUM, VIA: HCPCS | Performed by: SURGERY

## 2018-04-18 PROCEDURE — 74220 X-RAY XM ESOPHAGUS 1CNTRST: CPT

## 2018-04-18 PROCEDURE — 74011250636 HC RX REV CODE- 250/636: Performed by: SURGERY

## 2018-04-18 PROCEDURE — 80048 BASIC METABOLIC PNL TOTAL CA: CPT | Performed by: SURGERY

## 2018-04-18 PROCEDURE — 85027 COMPLETE CBC AUTOMATED: CPT | Performed by: SURGERY

## 2018-04-18 PROCEDURE — 74011250637 HC RX REV CODE- 250/637: Performed by: SURGERY

## 2018-04-18 PROCEDURE — 77030012341 HC CHMB SPCR OPTC MDI VYRM -A

## 2018-04-18 PROCEDURE — 74011000258 HC RX REV CODE- 258: Performed by: SURGERY

## 2018-04-18 PROCEDURE — 94760 N-INVAS EAR/PLS OXIMETRY 1: CPT

## 2018-04-18 PROCEDURE — 74011000250 HC RX REV CODE- 250: Performed by: SURGERY

## 2018-04-18 PROCEDURE — 36415 COLL VENOUS BLD VENIPUNCTURE: CPT | Performed by: SURGERY

## 2018-04-18 RX ORDER — ZOLPIDEM TARTRATE 5 MG/1
5 TABLET ORAL
Status: DISCONTINUED | OUTPATIENT
Start: 2018-04-18 | End: 2018-04-19 | Stop reason: HOSPADM

## 2018-04-18 RX ORDER — PANTOPRAZOLE SODIUM 40 MG/1
40 TABLET, DELAYED RELEASE ORAL
Status: DISCONTINUED | OUTPATIENT
Start: 2018-04-19 | End: 2018-04-19 | Stop reason: HOSPADM

## 2018-04-18 RX ORDER — HYDROMORPHONE HYDROCHLORIDE 2 MG/1
2 TABLET ORAL
Status: DISCONTINUED | OUTPATIENT
Start: 2018-04-18 | End: 2018-04-19 | Stop reason: HOSPADM

## 2018-04-18 RX ORDER — HYDROMORPHONE HYDROCHLORIDE 2 MG/1
4 TABLET ORAL
Status: DISCONTINUED | OUTPATIENT
Start: 2018-04-18 | End: 2018-04-19 | Stop reason: HOSPADM

## 2018-04-18 RX ORDER — CYCLOBENZAPRINE HCL 10 MG
10 TABLET ORAL
Status: DISCONTINUED | OUTPATIENT
Start: 2018-04-18 | End: 2018-04-19 | Stop reason: HOSPADM

## 2018-04-18 RX ORDER — ATORVASTATIN CALCIUM 40 MG/1
80 TABLET, FILM COATED ORAL
Status: DISCONTINUED | OUTPATIENT
Start: 2018-04-18 | End: 2018-04-19 | Stop reason: HOSPADM

## 2018-04-18 RX ADMIN — CEFOXITIN SODIUM 2 G: 2 POWDER, FOR SOLUTION INTRAVENOUS at 20:03

## 2018-04-18 RX ADMIN — METOCLOPRAMIDE 10 MG: 5 INJECTION, SOLUTION INTRAMUSCULAR; INTRAVENOUS at 21:48

## 2018-04-18 RX ADMIN — HYDROMORPHONE HYDROCHLORIDE 1 MG: 2 INJECTION, SOLUTION INTRAMUSCULAR; INTRAVENOUS; SUBCUTANEOUS at 03:15

## 2018-04-18 RX ADMIN — MONTELUKAST SODIUM 10 MG: 10 TABLET, FILM COATED ORAL at 21:48

## 2018-04-18 RX ADMIN — HYDROMORPHONE HYDROCHLORIDE 2 MG: 2 TABLET ORAL at 19:57

## 2018-04-18 RX ADMIN — DIATRIZOATE MEGLUMINE AND DIATRIZOATE SODIUM 30 ML: 660; 100 LIQUID ORAL; RECTAL at 09:15

## 2018-04-18 RX ADMIN — METOCLOPRAMIDE 10 MG: 5 INJECTION, SOLUTION INTRAMUSCULAR; INTRAVENOUS at 15:30

## 2018-04-18 RX ADMIN — VENLAFAXINE HYDROCHLORIDE 37.5 MG: 37.5 CAPSULE, EXTENDED RELEASE ORAL at 10:03

## 2018-04-18 RX ADMIN — METOCLOPRAMIDE 10 MG: 5 INJECTION, SOLUTION INTRAMUSCULAR; INTRAVENOUS at 08:04

## 2018-04-18 RX ADMIN — DULOXETINE HYDROCHLORIDE 60 MG: 60 CAPSULE, DELAYED RELEASE ORAL at 21:47

## 2018-04-18 RX ADMIN — SODIUM CHLORIDE AND POTASSIUM CHLORIDE 100 ML/HR: 9; 1.49 INJECTION, SOLUTION INTRAVENOUS at 09:58

## 2018-04-18 RX ADMIN — KETOROLAC TROMETHAMINE 30 MG: 30 INJECTION, SOLUTION INTRAMUSCULAR at 09:58

## 2018-04-18 RX ADMIN — GABAPENTIN 300 MG: 300 CAPSULE ORAL at 21:47

## 2018-04-18 RX ADMIN — ATORVASTATIN CALCIUM 80 MG: 40 TABLET, FILM COATED ORAL at 21:47

## 2018-04-18 RX ADMIN — SODIUM CHLORIDE AND POTASSIUM CHLORIDE 100 ML/HR: 9; 1.49 INJECTION, SOLUTION INTRAVENOUS at 21:00

## 2018-04-18 RX ADMIN — SODIUM CHLORIDE 40 MG: 9 INJECTION INTRAMUSCULAR; INTRAVENOUS; SUBCUTANEOUS at 09:58

## 2018-04-18 RX ADMIN — METOCLOPRAMIDE 10 MG: 5 INJECTION, SOLUTION INTRAMUSCULAR; INTRAVENOUS at 03:00

## 2018-04-18 RX ADMIN — CEFOXITIN SODIUM 2 G: 2 POWDER, FOR SOLUTION INTRAVENOUS at 03:15

## 2018-04-18 RX ADMIN — DULOXETINE HYDROCHLORIDE 60 MG: 60 CAPSULE, DELAYED RELEASE ORAL at 10:03

## 2018-04-18 RX ADMIN — CEFOXITIN SODIUM 2 G: 2 POWDER, FOR SOLUTION INTRAVENOUS at 13:11

## 2018-04-18 NOTE — PROGRESS NOTES
Pt resting in bed and is alert and oriented x 4. She denies pain and is on room air. RR even and unlabored. 5 ps with steristrips to abdomen c/d/i. Call light in reach and pt instructed to call for assistance if needed. Will monitor.

## 2018-04-18 NOTE — PROGRESS NOTES
General Surgery Progress Note    4/18/2018    Admit Date: 4/17/2018    Subjective:     Surgery POD #1  Patient has no further \"spitting up\" since Reglan started. She had a hiatal hernia repair done in addition to her sleeve gastrectomy. Objective:     Visit Vitals    /89    Pulse 93    Temp 97.8 °F (36.6 °C)    Resp 18    Ht 5' 6\" (1.676 m)    Wt 243 lb 4 oz (110.3 kg)    SpO2 97%    BMI 39.26 kg/m2         Intake/Output Summary (Last 24 hours) at 04/18/18 8655  Last data filed at 04/18/18 0239   Gross per 24 hour   Intake              883 ml   Output             1050 ml   Net             -167 ml        EXAM:  ABD soft, mild incisional tenderness, active BS's. Wounds intact without signs of bleeding or infection. Data Review    Recent Results (from the past 24 hour(s))   METABOLIC PANEL, BASIC    Collection Time: 04/18/18  5:51 AM   Result Value Ref Range    Sodium 138 136 - 145 mmol/L    Potassium 4.2 3.5 - 5.1 mmol/L    Chloride 102 98 - 107 mmol/L    CO2 25 21 - 32 mmol/L    Anion gap 11 7 - 16 mmol/L    Glucose 122 (H) 65 - 100 mg/dL    BUN 13 8 - 23 MG/DL    Creatinine 1.12 (H) 0.6 - 1.0 MG/DL    GFR est AA >60 >60 ml/min/1.73m2    GFR est non-AA 52 (L) >60 ml/min/1.73m2    Calcium 8.5 8.3 - 10.4 MG/DL   CBC W/O DIFF    Collection Time: 04/18/18  5:51 AM   Result Value Ref Range    WBC 11.5 (H) 4.3 - 11.1 K/uL    RBC 4.77 4.05 - 5.25 M/uL    HGB 12.2 11.7 - 15.4 g/dL    HCT 38.2 35.8 - 46.3 %    MCV 80.1 79.6 - 97.8 FL    MCH 25.6 (L) 26.1 - 32.9 PG    MCHC 31.9 31.4 - 35.0 g/dL    RDW 14.6 11.9 - 14.6 %    PLATELET 097 770 - 102 K/uL    MPV 9.6 (L) 10.8 - 14.1 [place]        Hospital Problems  Date Reviewed: 4/17/2018          Codes Class Noted POA    * (Principal)Morbid obesity with BMI of 40.0-44.9, adult (HCC) ICD-10-CM: E66.01, Z68.41  ICD-9-CM: 278.01, V85.41  4/17/2018 Yes          1. Swallow study today. 2. IVF's  3. Continue Reglan  4. OOB/IS/Lovenox  5. Pain control  6.  Further orders after swallow study.   Disha Fernandez MD.

## 2018-04-18 NOTE — PROGRESS NOTES
Am assessment . Pt is A/O x 4  . Lungs clear and respirations even and unlabored. S1 & S2 auscultated, heart rate regular. Abdomen is soft and tender to touch , BS active and pt is passing flatus  . 5 ps with steri -strips intact . Iv patent and infusing . pt is voiding without difficulty and ambulating in the halls . No c/o pain at this time .  Call light in reach

## 2018-04-18 NOTE — PROGRESS NOTES
Nutrition Assessment:  Anthropometrics: Ht: 5'6\", Wt: 243#, 187 %IBW, 39.2 BMI  Co-morbidities: KELLY, HTN, GERD, hyperlipidemia, Celiacs  Nutrition Diagnosis:   Altered GI function R/T wt loss surgery as evidenced by s/p VSG. Morbid obesity as evidenced by 187 %IBW and 39.2 BMI. Intervention:   1. Pt visited by bariatric RD. Diet advanced to clear liquid and pt acknowledges the need for OOB movement. 2. RD answered pt questions and emphasized 1. Hydration 2. Protein 3. Movement. Monitoring and Evaluation:  1. Follow for tolerance of small amounts of non-carbonated, no concentrated sweet clear liquids while admitted. 2. Follow for weight loss per bariatric guidelines. 3. Bariatric RD to f/u as outpatient.      Alexander Leung RD, LD

## 2018-04-18 NOTE — PROGRESS NOTES
Bariatric Surgery Progress Note    4/18/2018    Admit Date: 4/17/2018    Subjective:     Surgery POD #1  Patient completed swallow study this am and is receiving tray of clear liquids right now. She states she has no further nausea or spitting up and feels well. She has been up ambulating and using incentive spirometer.     Objective:     Visit Vitals    /63 (BP 1 Location: Left arm, BP Patient Position: At rest;Head of bed elevated (Comment degrees))    Pulse 89    Temp 97.6 °F (36.4 °C)    Resp 16    Ht 5' 6\" (1.676 m)    Wt 110.3 kg (243 lb 4 oz)    SpO2 97%    BMI 39.26 kg/m2         Intake/Output Summary (Last 24 hours) at 04/18/18 1043  Last data filed at 04/18/18 0239   Gross per 24 hour   Intake              683 ml   Output              800 ml   Net             -117 ml            Data Review    Recent Results (from the past 24 hour(s))   METABOLIC PANEL, BASIC    Collection Time: 04/18/18  5:51 AM   Result Value Ref Range    Sodium 138 136 - 145 mmol/L    Potassium 4.2 3.5 - 5.1 mmol/L    Chloride 102 98 - 107 mmol/L    CO2 25 21 - 32 mmol/L    Anion gap 11 7 - 16 mmol/L    Glucose 122 (H) 65 - 100 mg/dL    BUN 13 8 - 23 MG/DL    Creatinine 1.12 (H) 0.6 - 1.0 MG/DL    GFR est AA >60 >60 ml/min/1.73m2    GFR est non-AA 52 (L) >60 ml/min/1.73m2    Calcium 8.5 8.3 - 10.4 MG/DL   CBC W/O DIFF    Collection Time: 04/18/18  5:51 AM   Result Value Ref Range    WBC 11.5 (H) 4.3 - 11.1 K/uL    RBC 4.77 4.05 - 5.25 M/uL    HGB 12.2 11.7 - 15.4 g/dL    HCT 38.2 35.8 - 46.3 %    MCV 80.1 79.6 - 97.8 FL    MCH 25.6 (L) 26.1 - 32.9 PG    MCHC 31.9 31.4 - 35.0 g/dL    RDW 14.6 11.9 - 14.6 %    PLATELET 938 071 - 569 K/uL    MPV 9.6 (L) 10.8 - 14.1 [facility] Problems  Date Reviewed: 4/17/2018          Codes Class Noted POA    * (Principal)Morbid obesity with BMI of 40.0-44.9, adult ([facility] ICD-10-CM: E66.01, Z68.41  ICD-9-CM: 278.01, V85.41  4/17/2018 Yes                Reviewed home instructions for hydration, protein intake, and activity restrictions. Reviewed rules for PO intake. Has follow up appointment in office next week.       Diamond Fay RN

## 2018-04-18 NOTE — PROGRESS NOTES
Surgery Addendum  Swallow done. Results:  INDICATION: Morbid obesity, sleeve gastrectomy and hiatal hernia repair  yesterday, history of hiatal hernia with gastroesophageal reflux, evaluate for  leak or obstruction     COMPARISON: 10/30/2017     FINDINGS:  image demonstrates right upper quadrant surgical clips and right  mid abdominal IVC filter. Bowel gas pattern is nonobstructive. Surgical clips  project over the GE junction in keeping with recent surgery. A single contrast  examination was performed. The patient swallowed small sips of gastrografin  (less than 30 mL) without difficulty.      Fluoroscopic images in AP and multiple oblique angles show no evidence of  gastric obstruction or leak. The GE junction and stomach are somewhat narrowed  in keeping with the recent surgery. Gastrografin progressed easily from the  esophagus to the remaining stomach and into small bowel.     Fluoroscopy time 0.8 minutes, and images: 6 are saved.        IMPRESSION  IMPRESSION:  Sleeve gastrectomy and hiatal hernia repair. No evidence of leak  or obstruction. Plan: See orders.   Katelin Girard MD.

## 2018-04-19 VITALS
RESPIRATION RATE: 20 BRPM | WEIGHT: 243.25 LBS | DIASTOLIC BLOOD PRESSURE: 69 MMHG | TEMPERATURE: 98.2 F | HEIGHT: 66 IN | HEART RATE: 100 BPM | OXYGEN SATURATION: 93 % | BODY MASS INDEX: 39.09 KG/M2 | SYSTOLIC BLOOD PRESSURE: 128 MMHG

## 2018-04-19 LAB
ANION GAP SERPL CALC-SCNC: 8 MMOL/L (ref 7–16)
BUN SERPL-MCNC: 10 MG/DL (ref 8–23)
CALCIUM SERPL-MCNC: 8 MG/DL (ref 8.3–10.4)
CHLORIDE SERPL-SCNC: 111 MMOL/L (ref 98–107)
CO2 SERPL-SCNC: 24 MMOL/L (ref 21–32)
CREAT SERPL-MCNC: 0.83 MG/DL (ref 0.6–1)
ERYTHROCYTE [DISTWIDTH] IN BLOOD BY AUTOMATED COUNT: 14.7 % (ref 11.9–14.6)
GLUCOSE SERPL-MCNC: 90 MG/DL (ref 65–100)
HCT VFR BLD AUTO: 32.6 % (ref 35.8–46.3)
HGB BLD-MCNC: 10.3 G/DL (ref 11.7–15.4)
MCH RBC QN AUTO: 25.4 PG (ref 26.1–32.9)
MCHC RBC AUTO-ENTMCNC: 31.6 G/DL (ref 31.4–35)
MCV RBC AUTO: 80.5 FL (ref 79.6–97.8)
PLATELET # BLD AUTO: 253 K/UL (ref 150–450)
PMV BLD AUTO: 9.4 FL (ref 10.8–14.1)
POTASSIUM SERPL-SCNC: 4.5 MMOL/L (ref 3.5–5.1)
RBC # BLD AUTO: 4.05 M/UL (ref 4.05–5.25)
SODIUM SERPL-SCNC: 143 MMOL/L (ref 136–145)
WBC # BLD AUTO: 8.5 K/UL (ref 4.3–11.1)

## 2018-04-19 PROCEDURE — 94760 N-INVAS EAR/PLS OXIMETRY 1: CPT

## 2018-04-19 PROCEDURE — 74011250636 HC RX REV CODE- 250/636: Performed by: SURGERY

## 2018-04-19 PROCEDURE — 80048 BASIC METABOLIC PNL TOTAL CA: CPT | Performed by: SURGERY

## 2018-04-19 PROCEDURE — 74011000258 HC RX REV CODE- 258: Performed by: SURGERY

## 2018-04-19 PROCEDURE — 36415 COLL VENOUS BLD VENIPUNCTURE: CPT | Performed by: SURGERY

## 2018-04-19 PROCEDURE — 85027 COMPLETE CBC AUTOMATED: CPT | Performed by: SURGERY

## 2018-04-19 RX ADMIN — METOCLOPRAMIDE 10 MG: 5 INJECTION, SOLUTION INTRAMUSCULAR; INTRAVENOUS at 05:36

## 2018-04-19 RX ADMIN — BUDESONIDE AND FORMOTEROL FUMARATE DIHYDRATE 2 PUFF: 160; 4.5 AEROSOL RESPIRATORY (INHALATION) at 07:54

## 2018-04-19 RX ADMIN — ENOXAPARIN SODIUM 40 MG: 40 INJECTION SUBCUTANEOUS at 05:38

## 2018-04-19 RX ADMIN — CEFOXITIN SODIUM 2 G: 2 POWDER, FOR SOLUTION INTRAVENOUS at 05:36

## 2018-04-19 RX ADMIN — SODIUM CHLORIDE AND POTASSIUM CHLORIDE 100 ML/HR: 9; 1.49 INJECTION, SOLUTION INTRAVENOUS at 05:41

## 2018-04-19 NOTE — DISCHARGE INSTRUCTIONS
1. Liquid diet with two protein shakes per day until seen for first post-op visit. Try to get at least 60 grams of protein per day. 2. Showering is allowed, but no tub baths or swimming. 3. Drainage is common from the wounds. Change the dressings as needed. Call our office if the wounds become reddened, tender, feel warm to the touch or pus starts to drain from the wound. 4. Take prescribed pain medication as directed, usually Percocet, Dilaudid, Norco or Ultram. Take over the counter medication for minor pain. 5. Ice may be applied intermittently to the surgical site or sites. 6. Call or office, [phone], if problems arise. 7. Follow up in the office at the assigned time. 8. Resume all medications as taken per surgery, unless specifically instructed not to take certain ones. 9. No lifting more than 25 pounds until told otherwise. 10. Driving is allowed 3 days after surgery as long as you feel comfortable enough to drive and have not taken any prescription pain medication prior to driving. 11. Leave Steri-strips in place until seen in the office. If the strips start to curl up, fall off or begin to have an odor, then gently remove the strips. DISCHARGE SUMMARY from Nurse    The following personal items are in your possession at time of discharge:    Dental Appliances: Uppers  Visual Aid: None     Home Medications: None  Jewelry: None  Clothing: None  Other Valuables: None (All personal items left with daughters)             PATIENT INSTRUCTIONS:    After general anesthesia or intravenous sedation, for 24 hours or while taking prescription Narcotics:  · Limit your activities  · Do not drive and operate hazardous machinery  · Do not make important personal or business decisions  · Do  not drink alcoholic beverages  · If you have not urinated within 8 hours after discharge, please contact your surgeon on call.     Report the following to your surgeon:  · Excessive pain, swelling, redness or odor of or around the surgical area  · Temperature over 100.5  · Nausea and vomiting lasting longer than 4 hours or if unable to take medications  · Any signs of decreased circulation or nerve impairment to extremity: change in color, persistent  numbness, tingling, coldness or increase pain  · Any questions        What to do at Home:  Recommended activity: Activity as tolerated, per MD    If you experience any of the following symptoms fever>101, pain unrelieved with medication, nausea/vomiting, shortness of breath, dizziness/fainting, chest pain. , please follow up with your doctor. *  Please give a list of your current medications to your Primary Care Provider. *  Please update this list whenever your medications are discontinued, doses are      changed, or new medications (including over-the-counter products) are added. *  Please carry medication information at all times in case of emergency situations. These are general instructions for a healthy lifestyle:    No smoking/ No tobacco products/ Avoid exposure to second hand smoke    Surgeon General's Warning:  Quitting smoking now greatly reduces serious risk to your health. Obesity, smoking, and sedentary lifestyle greatly increases your risk for illness    A healthy diet, regular physical exercise & weight monitoring are important for maintaining a healthy lifestyle    You may be retaining fluid if you have a history of heart failure or if you experience any of the following symptoms:  Weight gain of 3 pounds or more overnight or 5 pounds in a week, increased swelling in our hands or feet or shortness of breath while lying flat in bed. Please call your doctor as soon as you notice any of these symptoms; do not wait until your next office visit.     Recognize signs and symptoms of STROKE:    F-face looks uneven    A-arms unable to move or move unevenly    S-speech slurred or non-existent    T-time-call 911 as soon as signs and symptoms begin-DO NOT go       Back to bed or wait to see if you get better-TIME IS BRAIN. Warning Signs of HEART ATTACK     Call 911 if you have these symptoms:   Chest discomfort. Most heart attacks involve discomfort in the center of the chest that lasts more than a few minutes, or that goes away and comes back. It can feel like uncomfortable pressure, squeezing, fullness, or pain.  Discomfort in other areas of the upper body. Symptoms can include pain or discomfort in one or both arms, the back, neck, jaw, or stomach.  Shortness of breath with or without chest discomfort.  Other signs may include breaking out in a cold sweat, nausea, or lightheadedness. Don't wait more than five minutes to call 911 - MINUTES MATTER! Fast action can save your life. Calling 911 is almost always the fastest way to get lifesaving treatment. Emergency Medical Services staff can begin treatment when they arrive -- up to an hour sooner than if someone gets to the hospital by car. The discharge information has been reviewed with the patient. The patient verbalized understanding. Discharge medications reviewed with the patient and appropriate educational materials and side effects teaching were provided. Sleeve Gastrectomy: What to Expect at Home  Your Recovery  Sleeve gastrectomy is surgery to remove part of the stomach to help with weight loss. The surgery limits the amount of food your stomach can hold. You will have some belly pain and may need pain medicine for the first week or so after surgery. The cuts (incisions) that the doctor made may be tender and sore. Because the surgery makes your stomach smaller, you will get full more quickly when you eat. It is important to think of this surgery as a tool to help you lose weight. It is not an instant fix. You will still need to eat a healthy diet and get regular exercise.  This will help you reach your weight goal and avoid regaining the weight you lose.  It is common to have many different emotions after this surgery. You may feel happy or excited as you begin to lose weight. But you may also feel overwhelmed or frustrated by the changes that you have to make in your diet, activity, and lifestyle. Talk with your doctor if you have concerns or questions. This care sheet gives you a general idea about how long it will take for you to recover. But each person recovers at a different pace. Follow the steps below to get better as quickly as possible. How can you care for yourself at home? Activity  ? · Rest when you feel tired. Getting enough sleep will help you recover. ? · Try to walk each day. Start out by walking a little more than you did the day before. Bit by bit, increase the amount you walk. Walking boosts blood flow and helps prevent pneumonia and constipation. ? · Avoid lifting anything that would make you strain. This may include heavy grocery bags and milk containers, a heavy briefcase or backpack, cat litter or dog food bags, a vacuum , or a child. ? · Avoid strenuous activities, such as bicycle riding, jogging, weight lifting, or aerobic exercise, until your doctor says it is okay. Do not take part in any activity where you could be hit in the belly. This could be sports or playing with children. ? · Hold a pillow over your incision when you cough or take deep breaths. This will support your belly and decrease your pain. ? · Do breathing exercises at home as instructed by your doctor. This will help prevent pneumonia. ? · You can shower. Pat the incision dry. Do not take a bath for the first 2 weeks, or until your doctor tells you it is okay. ? · You may drive when you are no longer taking prescription pain medicine and can quickly move your foot from the gas pedal to the brake. You must also be able to sit comfortably for a long period of time, even if you do not plan to go far. You might get caught in traffic.    ? · You will probably need to take 2 to 4 weeks off from work. It depends on the type of work you do and how you feel. ? · Ask your doctor when it is okay for you to have sex. Diet  ? · For the first week, stay on a liquid or soft diet. This includes broths, soups, milk shakes, puddings, and mashed potatoes. When you can eat these without difficulty, try other soft, solid foods, such as ground meat, shredded chicken, fish, pasta, and well-cooked vegetables and canned fruits. ? · Have 5 or 6 small meals each day instead of 2 or 3 large meals. Chew each bite of food very well. Eat slowly. You may need to take 20 to 30 minutes to eat a meal.   ? · Avoid crusty breads, bagels, tough meats, raw vegetables, nuts and seeds (including crackers and breads that have nuts and seeds), and other foods that are hard to digest. If you feel full quickly, try to drink fluids between meals instead of with meals. ? · Avoid carbonated beverages, such as soda pop. ? · Avoid drinking with straws. This may help you swallow less air when you drink. ? · Gradually return to your normal foods. This usually takes 4 to 6 weeks. ? · Check with your doctor before drinking alcohol. Your body may absorb alcohol more quickly after surgery. ? · You may notice that your bowel movements are not regular right after your surgery. This is common. Try to avoid constipation and straining with bowel movements. Take a fiber supplement every day. If you have not had a bowel movement after a couple of days, ask your doctor about taking a mild laxative. Medicines  ? · Your doctor will tell you if and when you can restart your medicines. He or she will also give you instructions about taking any new medicines. ? · If you take blood thinners, such as warfarin (Coumadin), clopidogrel (Plavix), or aspirin, be sure to talk to your doctor. He or she will tell you if and when to start taking those medicines again.  Make sure that you understand exactly what your doctor wants you to do. ? · Take pain medicines exactly as directed. ¨ If the doctor gave you a prescription medicine for pain, take it as prescribed. ¨ Do not take two or more pain medicines at the same time unless the doctor told you to. Many pain medicines contain acetaminophen, which is Tylenol. Too much acetaminophen (Tylenol) can be harmful. ¨ Do not take aspirin (Lewis, Bufferin), ibuprofen (Advil, Motrin), or naproxen (Aleve) until your doctor says it is okay. ? · If you think your pain medicine is making you sick to your stomach:  ¨ Take your medicine after meals (unless your doctor has told you not to). ¨ Ask your doctor for a different pain medicine. ? · If your doctor prescribed antibiotics, take them as directed. Do not stop taking them just because you feel better. You need to take the full course of antibiotics. Incision care  ? · If you have strips of tape on the incision, leave the tape on for a week or until it falls off.   ? · Wash the area daily with warm, soapy water and pat it dry. Don't use hydrogen peroxide or alcohol, which can slow healing. You may cover the area with a gauze bandage if it weeps or rubs against clothing. Change the bandage every day. ? · Keep the area clean and dry. Follow-up care is a key part of your treatment and safety. Be sure to make and go to all appointments, and call your doctor if you are having problems. It's also a good idea to know your test results and keep a list of the medicines you take. When should you call for help? Call 911 anytime you think you may need emergency care. For example, call if:  ? · You passed out (lost consciousness). ? · You are short of breath. ?Call your doctor now or seek immediate medical care if:  ? · You have pain that does not get better after you take pain medicine. ? · You cannot pass stool or gas. ? · You are sick to your stomach and cannot drink fluids.    ? · You have loose stitches, or your incision comes open. ? · You have signs of a blood clot, such as:  ¨ Pain in your calf, back of the knee, thigh, or groin. ¨ Redness and swelling in your leg or groin. ? · You have signs of infection, such as:  ¨ Increased pain, swelling, warmth, or redness. ¨ Red streaks leading from the incision. ¨ Pus draining from the incision. ¨ A fever. ? Watch closely for changes in your health, and be sure to contact your doctor if you have any problems. Where can you learn more? Go to http://kristan-rahul.info/. Enter [phone] in the search box to learn more about \"Sleeve Gastrectomy: What to Expect at Home. \"  Current as of: October 13, 2016  Content Version: 11.4  © 0497-1798 [employer]. Care instructions adapted under license by [employer] (which disclaims liability or warranty for this information). If you have questions about a medical condition or this instruction, always ask your healthcare professional. [place] [address] any warranty or liability for your use of this information.

## 2018-04-19 NOTE — PROGRESS NOTES
Pt resting in chair and is alert and oriented x 4. She denies pain and is on room air. RR even and unlabored. 5 PS with steristrips to abdomen c/d/i. Call light in reach and pt instructed to call for assistance if needed. Will monitor.

## 2018-04-19 NOTE — PROGRESS NOTES
General Surgery Progress Note    4/19/2018    Admit Date: 4/17/2018    Subjective:     Surgery POD #2  Patient has done well. Patient has voided without the Billy, ambulated, tolerated liquids and pain is controlled with just oral analgesics. No further problems swallowing. Objective:     Visit Vitals    /69 (BP 1 Location: Left arm, BP Patient Position: At rest;Sitting)    Pulse 100    Temp 98.2 °F (36.8 °C)    Resp 20    Ht 5' 6\" (1.676 m)    Wt 243 lb 4 oz (110.3 kg)    SpO2 97%    BMI 39.26 kg/m2         Intake/Output Summary (Last 24 hours) at 04/19/18 0749  Last data filed at 04/18/18 2152   Gross per 24 hour   Intake             2703 ml   Output             1100 ml   Net             1603 ml        EXAM:  ABD soft, mild incisional tenderness, active BS'S. Wounds are intact without signs of infection or evidence of bleeding.         Data Review    Recent Results (from the past 24 hour(s))   CBC W/O DIFF    Collection Time: 04/19/18  5:14 AM   Result Value Ref Range    WBC 8.5 4.3 - 11.1 K/uL    RBC 4.05 4.05 - 5.25 M/uL    HGB 10.3 (L) 11.7 - 15.4 g/dL    HCT 32.6 (L) 35.8 - 46.3 %    MCV 80.5 79.6 - 97.8 FL    MCH 25.4 (L) 26.1 - 32.9 PG    MCHC 31.6 31.4 - 35.0 g/dL    RDW 14.7 (H) 11.9 - 14.6 %    PLATELET 016 160 - 361 K/uL    MPV 9.4 (L) 10.8 - 28.3 FL   METABOLIC PANEL, BASIC    Collection Time: 04/19/18  5:14 AM   Result Value Ref Range    Sodium 143 136 - 145 mmol/L    Potassium 4.5 3.5 - 5.1 mmol/L    Chloride 111 (H) 98 - 107 mmol/L    CO2 24 21 - 32 mmol/L    Anion gap 8 7 - 16 mmol/L    Glucose 90 65 - 100 mg/dL    BUN 10 8 - 23 MG/DL    Creatinine 0.83 0.6 - 1.0 MG/DL    GFR est AA >60 >60 ml/min/1.73m2    GFR est non-AA >60 >60 ml/min/1.73m2    Calcium 8.0 (L) 8.3 - 10.4 MG/DL        Hospital Problems  Date Reviewed: 4/17/2018          Codes Class Noted POA    * (Principal)Morbid obesity with BMI of 40.0-44.9, adult (HCC) ICD-10-CM: E66.01, Z68.41  ICD-9-CM: 278.01, V85.41 4/17/2018 Yes              1. Discharge to home. 2. F/U with me on 4/26/18.     Higinio Aldridge MD.

## 2018-04-19 NOTE — PROGRESS NOTES
Shift assessment complete. Respirations present. Even and unlabored. No s/s of distress. Zero c/o pain at this time. Call light within reach. Encouraged patient to call for assistance. Patient verbalizes understanding. See Doc Flowsheet for assessment details. Patient resting in bed. Walking in room and hallway. Room air. Pain controlled. Abdomen soft. 5 puncture sites to abdomen.

## 2018-04-19 NOTE — DISCHARGE SUMMARY
BARIATRIC DISCHARGE SUMMARY      Patient: Manav Quinones MRN: [id number]     YOB: 1955  Age: 60 y.o. Sex: female      ADMIT DATE: 4/17/2018    ADMITTING DIAGNOSIS: Morbid obesity ([facility] [E66.01]     DISCHARGE DIAGNOSIS: Morbid obesity. PROCEDURES: Procedure(s):  GASTRECTOMY SLEEVE LAPAROSCOPIC/ HIATAL HERNIA REPAIR    HISTORY: The patient is a 60 y.o. female who came to the [facility] for consideration of weight loss surgery. The patient completed all of the requirements, meeting with the dietitian, a psychologist, and with myself. The patient had a good understanding of the risks and benefits of the procedure. After a discussion and signing our extensive consent form that we have in our office, the patient wanted to proceed with surgery and was scheduled for a sleeve gastrectomy. The patient underwent an  uneventful laparoscopic sleeve gastrectomy. The patient was then brought to the recovery room and subsequently to the third floor. The patient was up and ambulating, using her incentive spirometer the night of surgery. The morning after surgery, she had a negative Gastrografin swallow that was negative for leak or obstruction. The patient was started on a liquid diet. Billy catheter was removed and she was allowed to shower. The patient is ready for discharge on postop day #2, having met my discharge requirements. The patient has been able to tolerate a diet, voided without the Billy catheter, passed flatus, ambulated in the hallway, and her pain is controlled with just oral analgesics. The patient is ready for discharge in my opinion. DISCHARGE CONDITION: Stable. DISPOSITION: To go home. DISCHARGE MEDICATIONS:  See Medication Reconciliation   Percocet and Protonix prescriptions written. DIET: A liquid diet with 2 protein shakes a day to try to get 60 grams of protein.  The patient has been told not to eat any solid food, nothing that she has to chew. The patient's medications were reconciled in the chart. The patient was given a prescription for pain medication and every patient takes a proton pump inhibitor for 3 months after surgery. The patient has also been told she can walk, climb stairs, shower. No tub baths, swimming, heavy lifting. Nothing where she is going to stress or strain. FOLLOW UP: With me on 4/26/18.       Signed by:  Ami Zacarias MD                     April 19, 2018

## 2018-04-20 ENCOUNTER — PATIENT OUTREACH (OUTPATIENT)
Dept: CASE MANAGEMENT | Age: 63
End: 2018-04-20

## 2018-04-20 NOTE — PROGRESS NOTES
Transition of Care Discharge Follow-up Questionnaire   Date/Time of Call:   April 20, 2018 11:48AM   What was the patient hospitalized for? Morbid obesity with BMI of 40.0-44.9, adult        Does the patient understand his/her diagnosis and/or treatment and what happened during the hospitalization? Patient states understanding of diagnosis and treatment plan during hospitalization     Did the patient receive discharge instructions? Yes   CM Assessed Risk for Readmission:       Patient stated Risk for Readmission:      Yes      Patient states she has no risk for readmission. Review any discharge instructions (see discharge instructions/AVS in [facility]). Ask patient if they understand these. Do they have any questions? Discharge instructions reviewed with patient per connect care -DIET: A liquid diet with 2 protein shakes a day to try to get 60 grams of protein. The patient has been told not to eat any solid food, nothing that she has to chew. The patient's medications were reconciled in the chart. The patient was given a prescription for pain medication and every patient takes a proton pump inhibitor for 3 months after surgery. The patient has also been told she can walk, climb stairs, shower. No tub baths, swimming, heavy lifting. Nothing where she is going to stress or strain.     Felix Sampson states understanding of discharge instructions, patient states no questions. Were home services ordered (nursing, PT, OT, ST, etc.)? No home health services ordered. If so, has the first visit occurred? If not, why? (Assist with coordination of services if necessary. )   NA   Was any DME ordered? No durable medical equipment ordered. If so, has it been received? If not, why?  (Assist patient in obtaining DME orders &/or equipment if necessary. ) NA         Complete a review of all medications (new, continued and discontinued meds per the D/C instructions and medication tab in [facility]).  Care Coordinator reviewed all medications with patient per [facility]. Percocet and Protonix prescriptions written. Were all new prescriptions filled? If not, why?  (Assist patient in obtaining medications if necessary  escalate for CCM &/or SW if ongoing issues are verbalized by pt or anticipated)   Yes         Does the patient understand the purpose and dosing instructions for all medications? (If patient has questions, provide explanation and education.)   Patient states understanding of current medications and dosing instructions. Care Coordinator educated patient on the importance of medication compliance and reporting medication side effects to PCP/Specialist.      Does the patient have any problems in performing ADLs? (If patient is unable to perform ADLs  what is the limiting factor(s)? Do they have a support system that can assist? If no support system is present, discuss possible assistance that they may be able to obtain. Escalate for CCM/SW if ongoing issues are verbalized by pt or anticipated)   Patient states she is independent with ADL's and ambulation. Patient states she is doing great, patient states she went for a short walk this morning and states she was glad to be out walking and readjusting to being back home. Patient states she has had very little pain or nausea. Patient states she has her son and family who will assist her if she requires assistance. Does the patient have all follow-up appointments scheduled? 7 day f/up with PCP?   (f/up with PCP may be w/in 14 days if patient has a f/up with their specialist w/in 7 days)    7-14 day f/up with specialist?   (or per discharge instructions)    If f/up has not been made  what actions has the care coordinator made to accomplish this? Has transportation been arranged? Yes    Patient states she has follow up appointment with Dr. Toni Clements (PCP) on April 24, 2018.         Patient states follow up with  Brennan Ordonez (General Surgeon) March 27, 2018    NA        Yes   Any other questions or concerns expressed by the patient? No further needs identified, patient instructed to call Care Coordinator if further questions or concerns arise. Schedule next appointment with MARCO Stephenson or refer to RN Case Manager/ per the workflow guidelines. When is care coordinators next follow-up call scheduled? If referred for CCM  what RN care manager was the referral assigned? NA            NA        NA   ALEN Call Completed By: MELVIN Pete ACO  Care Coordinator         This note will not be viewable in [address]

## 2018-04-20 NOTE — PROGRESS NOTES
Care Coordinator left voicemail message on home/mobile # [phone], requesting a return call from patient. Care Coordinator will make several more attempts to reach patient for Transitions of Care Outreach. This note will not be viewable in [address]

## 2018-05-13 ENCOUNTER — ANESTHESIA EVENT (OUTPATIENT)
Dept: SURGERY | Age: 63
End: 2018-05-13
Payer: MEDICARE

## 2018-05-14 ENCOUNTER — HOSPITAL ENCOUNTER (OUTPATIENT)
Age: 63
Setting detail: OUTPATIENT SURGERY
Discharge: HOME | End: 2018-05-14
Attending: SURGERY | Admitting: SURGERY
Payer: MEDICARE

## 2018-05-14 ENCOUNTER — APPOINTMENT (OUTPATIENT)
Dept: INTERVENTIONAL RADIOLOGY/VASCULAR | Age: 63
End: 2018-05-14
Attending: SURGERY
Payer: MEDICARE

## 2018-05-14 ENCOUNTER — ANESTHESIA (OUTPATIENT)
Dept: SURGERY | Age: 63
End: 2018-05-14
Payer: MEDICARE

## 2018-05-14 VITALS
SYSTOLIC BLOOD PRESSURE: 129 MMHG | HEIGHT: 66 IN | TEMPERATURE: 97.4 F | RESPIRATION RATE: 14 BRPM | WEIGHT: 233.56 LBS | BODY MASS INDEX: 37.54 KG/M2 | OXYGEN SATURATION: 95 % | HEART RATE: 90 BPM | DIASTOLIC BLOOD PRESSURE: 63 MMHG

## 2018-05-14 LAB
ANION GAP SERPL CALC-SCNC: 9 MMOL/L (ref 7–16)
BUN SERPL-MCNC: 11 MG/DL (ref 8–23)
CALCIUM SERPL-MCNC: 8.8 MG/DL (ref 8.3–10.4)
CHLORIDE SERPL-SCNC: 103 MMOL/L (ref 98–107)
CO2 SERPL-SCNC: 30 MMOL/L (ref 21–32)
CREAT SERPL-MCNC: 0.88 MG/DL (ref 0.6–1)
GLUCOSE SERPL-MCNC: 109 MG/DL (ref 65–100)
POTASSIUM SERPL-SCNC: 3.2 MMOL/L (ref 3.5–5.1)
SODIUM SERPL-SCNC: 142 MMOL/L (ref 136–145)

## 2018-05-14 PROCEDURE — 74011250636 HC RX REV CODE- 250/636: Performed by: SURGERY

## 2018-05-14 PROCEDURE — 76210000020 HC REC RM PH II FIRST 0.5 HR: Performed by: SURGERY

## 2018-05-14 PROCEDURE — C1769 GUIDE WIRE: HCPCS

## 2018-05-14 PROCEDURE — 74011250637 HC RX REV CODE- 250/637: Performed by: ANESTHESIOLOGY

## 2018-05-14 PROCEDURE — 74011000250 HC RX REV CODE- 250

## 2018-05-14 PROCEDURE — C1773 RET DEV, INSERTABLE: HCPCS

## 2018-05-14 PROCEDURE — C1887 CATHETER, GUIDING: HCPCS

## 2018-05-14 PROCEDURE — 74011250636 HC RX REV CODE- 250/636: Performed by: ANESTHESIOLOGY

## 2018-05-14 PROCEDURE — 74011636320 HC RX REV CODE- 636/320: Performed by: SURGERY

## 2018-05-14 PROCEDURE — C1894 INTRO/SHEATH, NON-LASER: HCPCS

## 2018-05-14 PROCEDURE — 77030008703 HC TU ET UNCUF COVD -A: Performed by: ANESTHESIOLOGY

## 2018-05-14 PROCEDURE — 74011250636 HC RX REV CODE- 250/636

## 2018-05-14 PROCEDURE — 76210000006 HC OR PH I REC 0.5 TO 1 HR: Performed by: SURGERY

## 2018-05-14 PROCEDURE — 77030032500 HC CATH SZ PIG PRFRM MRTM -B

## 2018-05-14 PROCEDURE — 77030008477 HC STYL SATN SLP COVD -A: Performed by: ANESTHESIOLOGY

## 2018-05-14 PROCEDURE — 76010000153 HC OR TIME 1.5 TO 2 HR: Performed by: SURGERY

## 2018-05-14 PROCEDURE — 76060000034 HC ANESTHESIA 1.5 TO 2 HR: Performed by: SURGERY

## 2018-05-14 PROCEDURE — 80048 BASIC METABOLIC PNL TOTAL CA: CPT | Performed by: SURGERY

## 2018-05-14 PROCEDURE — 76937 US GUIDE VASCULAR ACCESS: CPT

## 2018-05-14 PROCEDURE — 77030020782 HC GWN BAIR PAWS FLX 3M -B: Performed by: ANESTHESIOLOGY

## 2018-05-14 RX ORDER — OXYCODONE HYDROCHLORIDE 5 MG/1
5 TABLET ORAL
Status: DISCONTINUED | OUTPATIENT
Start: 2018-05-14 | End: 2018-05-14 | Stop reason: HOSPADM

## 2018-05-14 RX ORDER — LIDOCAINE HYDROCHLORIDE 10 MG/ML
0.1 INJECTION INFILTRATION; PERINEURAL AS NEEDED
Status: DISCONTINUED | OUTPATIENT
Start: 2018-05-14 | End: 2018-05-14 | Stop reason: HOSPADM

## 2018-05-14 RX ORDER — SODIUM CHLORIDE, SODIUM LACTATE, POTASSIUM CHLORIDE, CALCIUM CHLORIDE 600; 310; 30; 20 MG/100ML; MG/100ML; MG/100ML; MG/100ML
1000 INJECTION, SOLUTION INTRAVENOUS CONTINUOUS
Status: DISCONTINUED | OUTPATIENT
Start: 2018-05-14 | End: 2018-05-14 | Stop reason: HOSPADM

## 2018-05-14 RX ORDER — HYDROMORPHONE HYDROCHLORIDE 2 MG/ML
0.5 INJECTION, SOLUTION INTRAMUSCULAR; INTRAVENOUS; SUBCUTANEOUS
Status: DISCONTINUED | OUTPATIENT
Start: 2018-05-14 | End: 2018-05-14 | Stop reason: HOSPADM

## 2018-05-14 RX ORDER — HEPARIN SODIUM 5000 [USP'U]/ML
INJECTION, SOLUTION INTRAVENOUS; SUBCUTANEOUS AS NEEDED
Status: DISCONTINUED | OUTPATIENT
Start: 2018-05-14 | End: 2018-05-14 | Stop reason: HOSPADM

## 2018-05-14 RX ORDER — SODIUM CHLORIDE 0.9 % (FLUSH) 0.9 %
5-10 SYRINGE (ML) INJECTION AS NEEDED
Status: DISCONTINUED | OUTPATIENT
Start: 2018-05-14 | End: 2018-05-14 | Stop reason: HOSPADM

## 2018-05-14 RX ORDER — SODIUM CHLORIDE, SODIUM LACTATE, POTASSIUM CHLORIDE, CALCIUM CHLORIDE 600; 310; 30; 20 MG/100ML; MG/100ML; MG/100ML; MG/100ML
75 INJECTION, SOLUTION INTRAVENOUS CONTINUOUS
Status: DISCONTINUED | OUTPATIENT
Start: 2018-05-14 | End: 2018-05-14 | Stop reason: HOSPADM

## 2018-05-14 RX ORDER — ONDANSETRON 2 MG/ML
INJECTION INTRAMUSCULAR; INTRAVENOUS AS NEEDED
Status: DISCONTINUED | OUTPATIENT
Start: 2018-05-14 | End: 2018-05-14 | Stop reason: HOSPADM

## 2018-05-14 RX ORDER — MIDAZOLAM HYDROCHLORIDE 1 MG/ML
2 INJECTION, SOLUTION INTRAMUSCULAR; INTRAVENOUS
Status: DISCONTINUED | OUTPATIENT
Start: 2018-05-14 | End: 2018-05-14 | Stop reason: HOSPADM

## 2018-05-14 RX ORDER — ACETAMINOPHEN 500 MG
500 TABLET ORAL ONCE
Status: DISCONTINUED | OUTPATIENT
Start: 2018-05-14 | End: 2018-05-14 | Stop reason: HOSPADM

## 2018-05-14 RX ORDER — NEOSTIGMINE METHYLSULFATE 1 MG/ML
INJECTION INTRAVENOUS AS NEEDED
Status: DISCONTINUED | OUTPATIENT
Start: 2018-05-14 | End: 2018-05-14 | Stop reason: HOSPADM

## 2018-05-14 RX ORDER — GLYCOPYRROLATE 0.2 MG/ML
INJECTION INTRAMUSCULAR; INTRAVENOUS AS NEEDED
Status: DISCONTINUED | OUTPATIENT
Start: 2018-05-14 | End: 2018-05-14 | Stop reason: HOSPADM

## 2018-05-14 RX ORDER — DIPHENHYDRAMINE HYDROCHLORIDE 50 MG/ML
12.5 INJECTION, SOLUTION INTRAMUSCULAR; INTRAVENOUS ONCE
Status: DISCONTINUED | OUTPATIENT
Start: 2018-05-14 | End: 2018-05-14 | Stop reason: HOSPADM

## 2018-05-14 RX ORDER — CEFAZOLIN SODIUM/WATER 2 G/20 ML
2 SYRINGE (ML) INTRAVENOUS
Status: COMPLETED | OUTPATIENT
Start: 2018-05-14 | End: 2018-05-14

## 2018-05-14 RX ORDER — FENTANYL CITRATE 50 UG/ML
INJECTION, SOLUTION INTRAMUSCULAR; INTRAVENOUS AS NEEDED
Status: DISCONTINUED | OUTPATIENT
Start: 2018-05-14 | End: 2018-05-14 | Stop reason: HOSPADM

## 2018-05-14 RX ORDER — FENTANYL CITRATE 50 UG/ML
100 INJECTION, SOLUTION INTRAMUSCULAR; INTRAVENOUS AS NEEDED
Status: DISCONTINUED | OUTPATIENT
Start: 2018-05-14 | End: 2018-05-14 | Stop reason: HOSPADM

## 2018-05-14 RX ORDER — LIDOCAINE HYDROCHLORIDE 20 MG/ML
INJECTION, SOLUTION EPIDURAL; INFILTRATION; INTRACAUDAL; PERINEURAL AS NEEDED
Status: DISCONTINUED | OUTPATIENT
Start: 2018-05-14 | End: 2018-05-14 | Stop reason: HOSPADM

## 2018-05-14 RX ORDER — ROCURONIUM BROMIDE 10 MG/ML
INJECTION, SOLUTION INTRAVENOUS AS NEEDED
Status: DISCONTINUED | OUTPATIENT
Start: 2018-05-14 | End: 2018-05-14 | Stop reason: HOSPADM

## 2018-05-14 RX ORDER — PROPOFOL 10 MG/ML
INJECTION, EMULSION INTRAVENOUS AS NEEDED
Status: DISCONTINUED | OUTPATIENT
Start: 2018-05-14 | End: 2018-05-14 | Stop reason: HOSPADM

## 2018-05-14 RX ORDER — DEXAMETHASONE SODIUM PHOSPHATE 4 MG/ML
INJECTION, SOLUTION INTRA-ARTICULAR; INTRALESIONAL; INTRAMUSCULAR; INTRAVENOUS; SOFT TISSUE AS NEEDED
Status: DISCONTINUED | OUTPATIENT
Start: 2018-05-14 | End: 2018-05-14 | Stop reason: HOSPADM

## 2018-05-14 RX ORDER — ONDANSETRON 2 MG/ML
4 INJECTION INTRAMUSCULAR; INTRAVENOUS ONCE
Status: COMPLETED | OUTPATIENT
Start: 2018-05-14 | End: 2018-05-14

## 2018-05-14 RX ORDER — SODIUM CHLORIDE 0.9 % (FLUSH) 0.9 %
5-10 SYRINGE (ML) INJECTION EVERY 8 HOURS
Status: DISCONTINUED | OUTPATIENT
Start: 2018-05-14 | End: 2018-05-14 | Stop reason: HOSPADM

## 2018-05-14 RX ORDER — OXYCODONE HYDROCHLORIDE 5 MG/1
10 TABLET ORAL
Status: DISCONTINUED | OUTPATIENT
Start: 2018-05-14 | End: 2018-05-14 | Stop reason: HOSPADM

## 2018-05-14 RX ORDER — NALOXONE HYDROCHLORIDE 0.4 MG/ML
0.1 INJECTION, SOLUTION INTRAMUSCULAR; INTRAVENOUS; SUBCUTANEOUS AS NEEDED
Status: DISCONTINUED | OUTPATIENT
Start: 2018-05-14 | End: 2018-05-14 | Stop reason: HOSPADM

## 2018-05-14 RX ADMIN — NEOSTIGMINE METHYLSULFATE 3 MG: 1 INJECTION INTRAVENOUS at 08:57

## 2018-05-14 RX ADMIN — SODIUM CHLORIDE, SODIUM LACTATE, POTASSIUM CHLORIDE, AND CALCIUM CHLORIDE 1000 ML: 600; 310; 30; 20 INJECTION, SOLUTION INTRAVENOUS at 06:22

## 2018-05-14 RX ADMIN — ONDANSETRON 4 MG: 2 INJECTION INTRAMUSCULAR; INTRAVENOUS at 07:44

## 2018-05-14 RX ADMIN — PROPOFOL 180 MG: 10 INJECTION, EMULSION INTRAVENOUS at 07:28

## 2018-05-14 RX ADMIN — ONDANSETRON 4 MG: 2 INJECTION INTRAMUSCULAR; INTRAVENOUS at 09:29

## 2018-05-14 RX ADMIN — LIDOCAINE HYDROCHLORIDE 80 MG: 20 INJECTION, SOLUTION EPIDURAL; INFILTRATION; INTRACAUDAL; PERINEURAL at 07:28

## 2018-05-14 RX ADMIN — FENTANYL CITRATE 50 MCG: 50 INJECTION, SOLUTION INTRAMUSCULAR; INTRAVENOUS at 08:24

## 2018-05-14 RX ADMIN — Medication 2 G: at 07:42

## 2018-05-14 RX ADMIN — FENTANYL CITRATE 50 MCG: 50 INJECTION, SOLUTION INTRAMUSCULAR; INTRAVENOUS at 07:28

## 2018-05-14 RX ADMIN — SODIUM CHLORIDE, SODIUM LACTATE, POTASSIUM CHLORIDE, AND CALCIUM CHLORIDE: 600; 310; 30; 20 INJECTION, SOLUTION INTRAVENOUS at 08:44

## 2018-05-14 RX ADMIN — DEXAMETHASONE SODIUM PHOSPHATE 4 MG: 4 INJECTION, SOLUTION INTRA-ARTICULAR; INTRALESIONAL; INTRAMUSCULAR; INTRAVENOUS; SOFT TISSUE at 07:44

## 2018-05-14 RX ADMIN — PROPOFOL 40 MG: 10 INJECTION, EMULSION INTRAVENOUS at 08:26

## 2018-05-14 RX ADMIN — OXYCODONE HYDROCHLORIDE 5 MG: 5 TABLET ORAL at 09:37

## 2018-05-14 RX ADMIN — ROCURONIUM BROMIDE 30 MG: 10 INJECTION, SOLUTION INTRAVENOUS at 07:28

## 2018-05-14 RX ADMIN — HYDROMORPHONE HYDROCHLORIDE 0.5 MG: 2 INJECTION, SOLUTION INTRAMUSCULAR; INTRAVENOUS; SUBCUTANEOUS at 09:34

## 2018-05-14 RX ADMIN — GLYCOPYRROLATE 0.6 MG: 0.2 INJECTION INTRAMUSCULAR; INTRAVENOUS at 08:57

## 2018-05-14 NOTE — ANESTHESIA POSTPROCEDURE EVALUATION
Post-Anesthesia Evaluation and Assessment    Patient: Jaden Dunlap MRN: [id number]  SSN: [id number]    YOB: 1955  Age: 60 y.o. Sex: female       Cardiovascular Function/Vital Signs  Visit Vitals    /67    Pulse 82    Temp 36.7 °C (98 °F)    Resp 12    Ht 5' 6\" (1.676 m)    Wt 105.9 kg (233 lb 9 oz)    SpO2 99%    BMI 37.7 kg/m2       Patient is status post general anesthesia for Procedure(s):  VENA CAVA FILTER REMOVAL . Nausea/Vomiting: None    Postoperative hydration reviewed and adequate. Pain:  Pain Scale 1: Numeric (0 - 10) (05/14/18 0934)  Pain Intensity 1: 10 (05/14/18 0934)   Managed    Neurological Status:   Neuro (WDL): (P) Exceptions to WDL (05/14/18 0916)  Neuro  Neurologic State: (P) Drowsy (05/14/18 8737)  Orientation Level: (P) Oriented X4 (05/14/18 0916)  Cognition: (P) Follows commands (05/14/18 0916)  Speech: (P) Clear (05/14/18 0916)  LUE Motor Response: (P) Spontaneous ; Purposeful (05/14/18 0916)  LLE Motor Response: (P) Spontaneous ; Purposeful (05/14/18 0916)  RUE Motor Response: (P) Purposeful;Spontaneous  (05/14/18 0916)  RLE Motor Response: (P) Purposeful;Spontaneous  (05/14/18 0916)   At baseline    Mental Status and Level of Consciousness: Arousable    Pulmonary Status:   O2 Device: Room air (05/14/18 0948)   Adequate oxygenation and airway patent    Complications related to anesthesia: None    Post-anesthesia assessment completed.  No concerns    Signed By: Jone Rivera MD     May 14, 2018

## 2018-05-14 NOTE — PROCEDURES
[facility] NOTE    Caleb Staley  MR#: [id number]  : 1955  ACCOUNT #: [de-identified]   DATE OF SERVICE: 2018    PREOPERATIVE DIAGNOSIS:  Status post temporary inferior vena cava filter placement. POSTOPERATIVE DIAGNOSIS:  Status post temporary inferior vena cava filter placement. PROCEDURE:  Retrieval of temporary inferior vena cava filter. SURGEON:  Sylvester Power MD     ANESTHESIA:  General endotracheal.    ESTIMATED BLOOD LOSS:  50 mL. DRAINS:  None. SPECIMENS REMOVED:  None. COMPLICATIONS:  None. IMPLANTS:  DESCRIPTION OF PROCEDURE:  The patient was brought to the angio suite, placed on the angio table in supine position. Following adequate IV sedation/general endotracheal anesthesia, the right neck was draped and prepped in sterile fashion. The right internal jugular vein was percutaneously punctured under direct vision using ultrasound. A 5-[place] sheath was placed. Next, an 0.035 guidewire was advanced into the IVC, followed by a 5-[place] pigtail catheter. An inferior vena cavogram was performed to determine whether or not there was thrombus present within the IVC filter and there was none seen. At this point, the large caliber (8-[place]) retrieval sheath of the [facility] retrieval system was advanced over a guidewire, and positioned just above the retrieval hook of the IVC filter. Multiple attempts were made to capture the hook with a CloverSnare, but the hook of the IVC filter was slightly imbedded in the wall of the IVC, as the IVC was tortuous at this level. Next, the sheath was upsized to a 12-[place] 45 cm sheath and positioned just above the IVC filter. A SOS catheter was advanced distal to the IVC, and a Glidewire was used to capture the hook. This was then snared and brought out through the 12-[place] sheath.   With the hook of the IVC filter successfully secured, the sheath was advanced slowly over the captured retrieval hook, collapsing IVC filter and captured within the retrieval sheath. The sheath was then removed entirely, including the IVC filter. Direct pressure was held, hemostasis was achieved. The patient tolerated the procedure well. The patient was then awakened from anesthesia and transferred to the recovery room in stable condition. The patient tolerated the procedure. No complications.       MD Florence Thomas / RUDY  D: 05/14/2018 12:52     T: 05/14/2018 14:09  JOB #: [id number]

## 2018-05-14 NOTE — H&P
[address]   [facility] FAX: [address] [facility] POOJA Bardales  1955    No chief complaint on file. HPI   Ms. Duncan Rider is a 60y.o. year old female who had temporary IVC filter placed last month prior to bariatric surgery. Now for filter retrieval today.  Denies any recent acute cv issues,    Current Facility-Administered Medications   Medication Dose Route Frequency Provider Last Rate Last Dose    ceFAZolin (ANCEF) 2 g/20 mL in sterile water IV syringe  2 g IntraVENous ON CALL TO OR Abby Davalos MD   Stopped at 05/14/18 0622    lidocaine (XYLOCAINE) 10 mg/mL (1 %) injection 0.1 mL  0.1 mL SubCUTAneous PRN Aleida Perez MD        lactated Ringers infusion 1,000 mL  1,000 mL IntraVENous CONTINUOUS Aleida Perez MD 75 mL/hr at 05/14/18 0622 1,000 mL at 05/14/18 0622    lactated ringers bolus infusion 500 mL  500 mL IntraVENous ONCE Aleida Perez MD        sodium chloride (NS) flush 5-10 mL  5-10 mL IntraVENous Q8H Aleida Perez MD        sodium chloride (NS) flush 5-10 mL  5-10 mL IntraVENous PRN Aleida Perez MD        fentaNYL citrate (PF) injection 100 mcg  100 mcg IntraVENous PRN Aleida Perez MD        midazolam (VERSED) injection 2 mg  2 mg IntraVENous ONCE PRN Aleida Perez MD         Allergies   Allergen Reactions    Codeine Nausea and Vomiting     Past Medical History:   Diagnosis Date    Allergic rhinitis     Anxiety     Arthritis     osteo    Asthma     as a child~still uses inhaler    Celiac disease     Depression     DVT (deep vein thrombosis) in pregnancy ([facility] [address]     Dysthymic disorder     Esophageal reflux     Exophthalmos     Fibromyalgia     GERD (gastroesophageal reflux disease)     none since gastrectomy 4/2018-  hiatal hernia corercted during surgery, nexium daily    Hiatal hernia     Hot flashes     Hypercholesterolemia     Hyperlipidemia     Hypertension     managed with meds    IBS (irritable bowel syndrome)     SABIHA (iron deficiency anemia)     Impaired fasting glucose     Liver disease     \"liver cyst\"    Lumbago     Malaise and fatigue     Morbid obesity ([address]     Nonallopathic lesion of sacral region     KELLY (obstructive sleep apnea)     Osteoarthritis     Osteoporosis     Osteoporosis     Other speech disturbances     PUD (peptic ulcer disease)     Pulmonary embolism (HCC)     Pulmonary hypertension, mild (HCC)     RA (rheumatoid arthritis) (HCC)     RLS (restless legs syndrome)     Sarcoidosis     Sciatic neuralgia     Tarsal tunnel syndrome     Urinary incontinence     Wears glasses     Wears partial dentures      Family History   Problem Relation Age of Onset    Pneumonia Mother     Diabetes Father     Cancer Father      colon ca    Colon Cancer Other     Cancer Other      lung     Other Other      fam hx of liver     Past Surgical History:   Procedure Laterality Date    BREAST SURGERY PROCEDURE UNLISTED      Reduction    HX CARPAL TUNNEL RELEASE Right     2 x    HX CHOLECYSTECTOMY      HX GASTRECTOMY  04/17/2018    HX HYSTERECTOMY      HX ORTHOPAEDIC      right knee, right toe, right elbow, left wrist ~fracture repairs    HX TONSILLECTOMY      as a child     Social History   Substance Use Topics    Smoking status: Never Smoker    Smokeless tobacco: Never Used    Alcohol use No      Comment: social        Review of Systems  Constitutional: Negative for fever and chills. HENT: Negative for congestion and sore throat. Skin: Negative for rash and itching. Eyes: Negative for blurred vision and double vision. Respiratory: Negative for cough and shortness of breath. Cardiovascular: Negative for chest pain, palpitations, claudication and leg swelling. Gastrointestinal: Negative for nausea, vomiting and abdominal pain. Genitourinary: Negative for dysuria, hematuria and flank pain.   Musculoskeletal: Negative for joint pain and falls. Neurological: Negative for dizziness, sensory change, focal weakness, loss of consciousness and headaches. PHYSICAL EXAM     Vitals:    05/14/18 0615 05/14/18 0650 05/14/18 0701   BP: 122/58     BP 1 Location: Right arm     BP Patient Position: At rest     Pulse: 98     Resp: 18     Temp: 98.2 °F (36.8 °C)     SpO2: 96% 90% 98%   Weight: 233 lb 9 oz (105.9 kg)     Height: 5' 6\" (1.676 m)          Constitutional: she appears well-developed. No distress. HENT: Hearing intact. Head: Atraumatic. Eyes: Pupils are equal, round, and reactive to light. Neck: Normal range of motion. Cardiovascular: Regular rhythm. Pulmonary/Chest: Effort normal and breath sounds normal. No respiratory distress. Abdominal: Soft. Bowel sounds are normal. she exhibits no distension. There is no tenderness. There is no guarding. No hernia. Musculoskeletal: Normal range of motion. Neurological: She is alert. CN II- XII grossly intact  Skin: Warm and dry. Vascular:    Right:    LEFT:                      Radial: 2+    Radial: 2+         Brachial: 2+   Brachial: 2+          Femoral: 2+   Femoral: 2+         Popliteal: 2+   Popliteal: 2+         DP: 2+    DP: 2+         PT: 2+    PT: 2+         Carotid Bruit: No   Carotid Bruit: No      Imaging Results      ASSESSMENT AND PLAN   Ms. Ronny Lucas is a 60y.o. year old female for IVC filter retrieval today. I have discussed the need for the procedure with the patient and family. I have discussed the procedure with the patient/family in detail today including but not limited to the risk of death, bleeding requiring transfusion, infection, limb loss, or the necessity of subsequent procedures. All questions were answered. They understand and agree to proceed. Westley Perez MD    Elements of this note have been dictated using speech recognition software. As a result, errors of speech recognition may have occurred.     Elements of this note have been dictated using speech recognition software. As a result, errors of speech recognition may have occurred.

## 2018-05-14 NOTE — BRIEF OP NOTE
BRIEF OPERATIVE NOTE    Date of Procedure: 5/14/2018   Preoperative Diagnosis: History of DVT (deep vein thrombosis) [Z86.718]  Postoperative Diagnosis: History of DVT (deep vein thrombosis) [Z86.718]    Procedure(s):  VENA CAVA FILTER REMOVAL   Surgeon(s) and Role:     * Hoang Gonzalez MD - Primary         Surgical Assistant:     Surgical Staff:  Circ-1: Dawna Jacques RN  Radiology Technician: Joel Owen, RT, R, CT  Event Time In   Incision Start 8114   Incision Close 0909     Anesthesia: General   Estimated Blood Loss: 25cc  Specimens: * No specimens in log *   Findings: Retrieval hook embedded in IVC wall, hangman technique used to capture and retrieve filter   Complications: None  Implants: * No implants in log *

## 2018-05-14 NOTE — DISCHARGE INSTRUCTIONS
Home Care Instructions: You can resume your regular diet and medication regimen. Do not lift anything heavier than a gallon of milk, or do anything strenuous for the next 24 hours. You will notice a dressing on your neck or groin. This was the insertion site for the retrieval of the device. Keep the site covered until the puncture site has totally healed. You make take a shower with the bandage, but do cover it with plastic wrap. Do not immerse yourself in water until the wound has totally healed. After your puncture wound heals, there is no special care that is needed. You may resume your normal level of activity slowly, after about one week of light activity. Call If:   You should call your Physician and/or the Radiology Nurse if you have any bleeding other than a small spot on your bandage. Please call if you have any signs of infection; fever, swelling, or increased pain at the site. Call if you have any questions of how to take care of your wound. Follow-Up Instructions: Please see your ordering doctor as he/she has requested. After general anesthesia or intravenous sedation, for 24 hours or while taking prescription Narcotics:  · Limit your activities  · Do not drive and operate hazardous machinery  · Do not make important personal or business decisions  · Do  not drink alcoholic beverages  · If you have not urinated within 8 hours after discharge, please contact your surgeon on call. *  Please give a list of your current medications to your Primary Care Provider. *  Please update this list whenever your medications are discontinued, doses are      changed, or new medications (including over-the-counter products) are added. *  Please carry medication information at all times in case of emergency situations.     These are general instructions for a healthy lifestyle:  No smoking/ No tobacco products/ Avoid exposure to second hand smoke  Surgeon General's Warning:  Quitting smoking now greatly reduces serious risk to your health. Obesity, smoking, and sedentary lifestyle greatly increases your risk for illness  A healthy diet, regular physical exercise & weight monitoring are important for maintaining a healthy lifestyle    You may be retaining fluid if you have a history of heart failure or if you experience any of the following symptoms:  Weight gain of 3 pounds or more overnight or 5 pounds in a week, increased swelling in our hands or feet or shortness of breath while lying flat in bed. Please call your doctor as soon as you notice any of these symptoms; do not wait until your next office visit. Recognize signs and symptoms of STROKE:  F-face looks uneven  A-arms unable to move or move unevenly  S-speech slurred or non-existent  T-time-call 911 as soon as signs and symptoms begin-DO NOT go       Back to bed or wait to see if you get better-TIME IS BRAIN.

## 2018-05-14 NOTE — IP AVS SNAPSHOT
[address] [address] [facility] [address] 
[phone] Patient: Pa Douglas MRN: [id number] UNI:[id number] About your hospitalization You were admitted on:  May 14, 2018 You last received care in the:  [facility] PACU You were discharged on:  May 14, 2018 Why you were hospitalized Your primary diagnosis was:  Not on File Follow-up Information Follow up With Details Comments Contact Info Nate Fox MD  Follow up as needed with Dr. Kilpatrick Police [address] [facility], [place] [address] 
[phone] Your Scheduled Appointments Thursday May 31, 2018  1:50 PM EDT  
GPO BAR with Bridger Zepeda MD  
[place] [facility] ([address] [place] [id number] [phone] Tuesday June 26, 2018 11:30 AM EDT SHORT with Tamika Leblanc NP  
[address]  
[phone] Tuesday June 26, 2018  1:00 PM EDT SHORT with Dilan Rogers MD  
[address]  
[phone] Discharge Orders None A check josh indicates which time of day the medication should be taken. My Medications CHANGE how you take these medications Instructions Each Dose to Equal  
 Morning Noon Evening Bedtime  
 alendronate 70 mg tablet Commonly known as:  FOSAMAX What changed:  additional instructions Your last dose was: Your next dose is: Take 1 Tab by mouth every seven (7) days. Indications: POST-MENOPAUSAL OSTEOPOROSIS  
 70 mg  
    
   
   
   
  
 ALPRAZolam 1 mg tablet Commonly known as:  Marcelino Duran What changed:  additional instructions Your last dose was: Your next dose is: Take 1 Tab by mouth two (2) times a day. Max Daily Amount: 2 mg. PER MH  Indications: anxiety 1 mg  
    
   
   
   
  
 atorvastatin 80 mg tablet Commonly known as:  LIPITOR What changed:  when to take this Your last dose was: Your next dose is: Take 1 Tab by mouth daily. Indications: hyperlipidemia 80 mg  
    
   
   
   
  
 cyclobenzaprine 10 mg tablet Commonly known as:  FLEXERIL What changed:   
- when to take this 
- reasons to take this Your last dose was: Your next dose is: Take 1 Tab by mouth three (3) times daily (with meals). Indications: Muscle Spasm  
 10 mg DULoxetine 60 mg capsule Commonly known as:  CYMBALTA What changed:  additional instructions Your last dose was: Your next dose is: Take 1 Cap by mouth two (2) times a day for 60 days. 60 mg  
    
   
   
   
  
 esomeprazole 40 mg capsule Commonly known as:  NexIUM What changed:  additional instructions Your last dose was: Your next dose is: Take 1 Cap by mouth daily. Indications: gastroesophageal reflux disease, Heartburn 40 mg  
    
   
   
   
  
 hydrOXYzine HCl 50 mg tablet Commonly known as:  ATARAX What changed:  additional instructions Your last dose was: Your next dose is: Take 1 Tab by mouth daily as needed for Anxiety. 1/2 to 1 qday for anxiety 50 mg  
    
   
   
   
  
 loratadine 10 mg tablet Commonly known as:  Juana Stanley What changed:   
- when to take this 
- reasons to take this 
- additional instructions Your last dose was: Your next dose is: Take 1 Tab by mouth daily. Indications: ALLERGIC RHINITIS 10 mg  
    
   
   
   
  
 montelukast 10 mg tablet Commonly known as:  SINGULAIR What changed:   
- when to take this 
- reasons to take this 
- additional instructions Your last dose was: Your next dose is: Take 1 Tab by mouth daily. Indications: MAINTENANCE THERAPY FOR ASTHMA 10 mg  
    
   
   
   
  
 traMADol 50 mg tablet Commonly known as:  ULTRAM  
What changed:   
- when to take this 
- reasons to take this Your last dose was: Your next dose is: Take 1 Tab by mouth four (4) times daily. Max Daily Amount: 200 mg.  
 50 mg Venlafaxine 37.5 mg Tr24 What changed:  when to take this Your last dose was: Your next dose is: Take 1 Tab by mouth daily. Indications: VASOMOTOR SYMPTOMS ASSOCIATED WITH MENOPAUSE  
 1 Tab CONTINUE taking these medications Instructions Each Dose to Equal  
 Morning Noon Evening Bedtime  
 aspirin 325 mg tablet Commonly known as:  ASPIRIN Your last dose was: Your next dose is: Take 325 mg by mouth daily. Patient states held since 4/10/18. Pt takes for general health, denies hx of mi, stents, cva or dvt. 325 mg  
    
   
   
   
  
 BIOTIN PO Your last dose was: Your next dose is: Take  by mouth daily. calcium-cholecalciferol (d3) 600 mg calcium- 200 unit Cap Your last dose was: Your next dose is: Take 2 Tabs by mouth daily. Indications: HYPOCALCEMIA PREVENTION, PREVENTION OF VITAMIN D DEFICIENCY  
 2 Tab  
    
   
   
   
  
 clobetasol 0.05 % topical cream  
Commonly known as:  Warnell January Your last dose was: Your next dose is:    
   
   
 Apply  to affected area two (2) times a day.  
     
   
   
   
  
 gabapentin 300 mg capsule Commonly known as:  NEURONTIN Your last dose was: Your next dose is: Take 1 Cap by mouth nightly. Indications: NEUROPATHIC PAIN  
 300 mg Iron 325 mg (65 mg iron) tablet Generic drug:  ferrous sulfate Your last dose was: Your next dose is: Take  by mouth Daily (before breakfast). MAGNESIUM PO Your last dose was: Your next dose is: Take  by mouth.  
     
   
   
   
  
 multivitamin tablet Commonly known as:  ONE A DAY Your last dose was: Your next dose is: Take 1 Tab by mouth daily. 1 Tab  
    
   
   
   
  
 pramipexole 0.5 mg tablet Commonly known as:  MIRAPEX Your last dose was: Your next dose is: TAKE 2 TABLETS BY MOUTH 1 TO 2 HOURS BEFORE BEDTIME  
     
   
   
   
  
 promethazine 25 mg tablet Commonly known as:  PHENERGAN Your last dose was: Your next dose is: Take 1 Tab by mouth every six (6) hours as needed for Nausea. 25 mg  
    
   
   
   
  
 SYMBICORT 160-4.5 mcg/actuation Hfaa Generic drug:  budesonide-formoterol Your last dose was: Your next dose is:    
   
   
 INHALE 2 PUFFS TWICE A DAY DURING THE DAY AND 2 PUFFS AT NIGHT; take day of surgery VITAMIN C PO Your last dose was: Your next dose is: Take  by mouth daily. VITAMIN D3 1,000 unit tablet Generic drug:  cholecalciferol Your last dose was: Your next dose is: Take 1,000 Units by mouth daily. Indications: PREVENTION OF VITAMIN D DEFICIENCY  
 1000 Units  
    
   
   
   
  
 vitamin E 400 unit capsule Commonly known as:  [address] Your last dose was: Your next dose is: Take  by mouth daily. Opioid Education Prescription Opioids: What You Need to Know: 
 
Prescription opioids can be used to help relieve moderate-to-severe pain and are often prescribed following a surgery or injury, or for certain health conditions.   These medications can be an important part of treatment but also come with serious risks. Opioids are strong pain medicines. Examples include hydrocodone, oxycodone, fentanyl, and morphine. Heroin is an example of an illegal opioid. It is important to work with your health care provider to make sure you are getting the safest, most effective care. WHAT ARE THE RISKS AND SIDE EFFECTS OF OPIOID USE? Prescription opioids carry serious risks of addiction and overdose, especially with prolonged use. An opioid overdose, often marked by slow breathing, can cause sudden death. The use of prescription opioids can have a number of side effects as well, even when taken as directed. · Tolerance-meaning you might need to take more of a medication for the same pain relief · Physical dependence-meaning you have symptoms of withdrawal when the medication is stopped. Withdrawal symptoms can include nausea, sweating, chills, diarrhea, stomach cramps, and muscle aches. Withdrawal can last up to several weeks, depending on which drug you took and how long you took it. · Increased sensitivity to pain · Constipation · Nausea, vomiting, and dry mouth · Sleepiness and dizziness · Confusion · Depression · Low levels of testosterone that can result in lower sex drive, energy, and strength · Itching and sweating RISKS ARE GREATER WITH:      
· History of drug misuse, substance use disorder, or overdose · Mental health conditions (such as depression or anxiety) · Sleep apnea · Older age (72 years or older) · Pregnancy Avoid alcohol while taking prescription opioids. Also, unless specifically advised by your health care provider, medications to avoid include: · Benzodiazepines (such as Xanax or Valium) · Muscle relaxants (such as Soma or Flexeril) · Hypnotics (such as Ambien or Lunesta) · Other prescription opioids KNOW YOUR OPTIONS Talk to your health care provider about ways to manage your pain that don't involve prescription opioids. Some of these options may actually work better and have fewer risks and side effects. Options may include: 
· Pain relievers such as acetaminophen, ibuprofen, and naproxen · Some medications that are also used for depression or seizures · Physical therapy and exercise · Counseling to help patients learn how to cope better with triggers of pain and stress. · Application of heat or cold compress · Massage therapy · Relaxation techniques Be Informed Make sure you know the name of your medication, how much and how often to take it, and its potential risks & side effects. IF YOU ARE PRESCRIBED OPIOIDS FOR PAIN: 
· Never take opioids in greater amounts or more often than prescribed. Remember the goal is not to be pain-free but to manage your pain at a tolerable level. · Follow up with your primary care provider to: · Work together to create a plan on how to manage your pain. · Talk about ways to help manage your pain that don't involve prescription opioids. · Talk about any and all concerns and side effects. · Help prevent misuse and abuse. · Never sell or share prescription opioids · Help prevent misuse and abuse. · Store prescription opioids in a secure place and out of reach of others (this may include visitors, children, friends, and family). · Safely dispose of unused/unwanted prescription opioids: Find your community drug take-back program or your pharmacy mail-back program, or flush them down the toilet, following guidance from the Food and Drug Administration (www.fda.gov/Drugs/ResourcesForYou). · Visit www.cdc.gov/drugoverdose to learn about the risks of opioid abuse and overdose. · If you believe you may be struggling with addiction, tell your health care provider and ask for guidance or call [address] [place], [place] [address] at 3-486-703-HERI. Discharge Instructions Home Care Instructions: You can resume your regular diet and medication regimen. Do not lift anything heavier than a gallon of milk, or do anything strenuous for the next 24 hours. You will notice a dressing on your neck or groin. This was the insertion site for the retrieval of the device. Keep the site covered until the puncture site has totally healed. You make take a shower with the bandage, but do cover it with plastic wrap. Do not immerse yourself in water until the wound has totally healed. After your puncture wound heals, there is no special care that is needed. You may resume your normal level of activity slowly, after about one week of light activity. Call If: 
 You should call your Physician and/or the Radiology Nurse if you have any bleeding other than a small spot on your bandage. Please call if you have any signs of infection; fever, swelling, or increased pain at the site. Call if you have any questions of how to take care of your wound. Follow-Up Instructions: Please see your ordering doctor as he/she has requested. After general anesthesia or intravenous sedation, for 24 hours or while taking prescription Narcotics: · Limit your activities · Do not drive and operate hazardous machinery · Do not make important personal or business decisions · Do  not drink alcoholic beverages · If you have not urinated within 8 hours after discharge, please contact your surgeon on call. *  Please give a list of your current medications to your Primary Care Provider. *  Please update this list whenever your medications are discontinued, doses are 
    changed, or new medications (including over-the-counter products) are added. *  Please carry medication information at all times in case of emergency situations. These are general instructions for a healthy lifestyle: No smoking/ No tobacco products/ Avoid exposure to second hand smoke Surgeon General's Warning:  Quitting smoking now greatly reduces serious risk to your health. Obesity, smoking, and sedentary lifestyle greatly increases your risk for illness A healthy diet, regular physical exercise & weight monitoring are important for maintaining a healthy lifestyle You may be retaining fluid if you have a history of heart failure or if you experience any of the following symptoms:  Weight gain of 3 pounds or more overnight or 5 pounds in a week, increased swelling in our hands or feet or shortness of breath while lying flat in bed. Please call your doctor as soon as you notice any of these symptoms; do not wait until your next office visit. Recognize signs and symptoms of STROKE: 
F-face looks uneven A-arms unable to move or move unevenly S-speech slurred or non-existent T-time-call 911 as soon as signs and symptoms begin-DO NOT go Back to bed or wait to see if you get better-TIME IS BRAIN. ACO Transitions of Care Introducing [employer] [address] offers a voluntary care coordination program to provide high quality service and care to [facility] fee-for-service beneficiaries. [facility] [place] [facility] was designed to help you enhance your health and well-being through the following services: ? Transitions of Care  support for individuals who are transitioning from one care setting to another (example: Hospital to home). ? Chronic and Complex Care Coordination  support for individuals and caregivers of those with serious or chronic illnesses or with more than one chronic (ongoing) condition and those who take a number of different medications. If you meet specific medical criteria, a [address] may call you directly to coordinate your care with your primary care physician and your other care providers. For questions about the [facility] programs, please, contact your physicians office. For general questions or additional information about Accountable Care Organizations: 
Please visit www.medicare.gov/acos. html or call 1-800-MEDICARE (4-290.189.5767) TTY users should call 1-703.233.8626. Introducing [facility] SERVICES! [employer] introduces Trendient patient portal. Now you can access parts of your medical record, email your doctor's office, and request medication refills online. 1. In your internet browser, go to [email or web address] 2. Click on the First Time User? Click Here link in the Sign In box. You will see the New Member Sign Up page. 3. Enter your Trendient Access Code exactly as it appears below. You will not need to use this code after youve completed the sign-up process. If you do not sign up before the expiration date, you must request a new code. · Trendient Access Code: [email or web address] Expires: 6/28/2018  4:35 PM 
 
4. Enter the last four digits of your Social Security Number (xxxx) and Date of Birth (mm/dd/yyyy) as indicated and click Submit. You will be taken to the next sign-up page. 5. Create a Trendient ID. This will be your Trendient login ID and cannot be changed, so think of one that is secure and easy to remember. 6. Create a Trendient password. You can change your password at any time. 7. Enter your Password Reset Question and Answer. This can be used at a later time if you forget your password. 8. Enter your e-mail address. You will receive e-mail notification when new information is available in [address] 9. Click Sign Up. You can now view and download portions of your medical record. 10. Click the Download Summary menu link to download a portable copy of your medical information. If you have questions, please visit the Frequently Asked Questions section of the Trendient website. Remember, Trendient is NOT to be used for urgent needs. For medical emergencies, dial 911. Now available from your iPhone and Android! Introducing Fox Kenny As a [facility] patient, I wanted to make you aware of our electronic visit tool called Fox Kenny. [employer] 24/7 allows you to connect within minutes with a medical provider 24 hours a day, seven days a week via a mobile device or tablet or logging into a secure website from your computer. You can access Fox Kenny from anywhere in the [place]. A virtual visit might be right for you when you have a simple condition and feel like you just dont want to get out of bed, or cant get away from work for an appointment, when your regular [facility] provider is not available (evenings, weekends or holidays), or when youre out of town and need minor care. Electronic visits cost only $49 and if the [employer]/7 provider determines a prescription is needed to treat your condition, one can be electronically transmitted to a nearby pharmacy*. Please take a moment to enroll today if you have not already done so. The enrollment process is free and takes just a few minutes. To enroll, please download the [employer]/MyCadbox liane to your tablet or phone, or visit [email or web address] to enroll on your computer. And, as an [address] [place] [address] patient with a [employer] account, the results of your visits will be scanned into your electronic medical record and your primary care provider will be able to view the scanned results. We urge you to continue to see your regular [facility] provider for your ongoing medical care. And while your primary care provider may not be the one available when you seek a Fxo Kenny virtual visit, the peace of mind you get from getting a real diagnosis real time can be priceless. For more information on Fox Kenny, view our Frequently Asked Questions (FAQs) at [email or web address]. Sincerely, 
 
Norma Scott MD 
Chief Medical Officer Domi Stuart *:  certain medications cannot be prescribed via Fox Kenny Unresulted Labs-Please follow up with your PCP about these lab tests Order Current Status IR REMOVE IVC FILTER W SI In process Providers Seen During Your Hospitalization Provider Specialty Primary office phone Ekta Corona MD Vascular Surgery [phone] Your Primary Care Physician (PCP) Primary Care Physician Office Phone Office Fax Idalia Sandoval [phone] You are allergic to the following Allergen Reactions Codeine Nausea and Vomiting Recent Documentation Height Weight BMI OB Status Smoking Status 1.676 m 105.9 kg 37.7 kg/m2 Hysterectomy Never Smoker Emergency Contacts Name Discharge Info Relation Home Work Mobile Roscoe Johnson  Child [2] [phone] Anabell Michel   [phone] Patient Belongings The following personal items are in your possession at time of discharge: 
  Dental Appliances: Uppers         Home Medications: None   Jewelry: None  Clothing: Shirt, Pants, Footwear    Other Valuables: None Please provide this summary of care documentation to your next provider. Signatures-by signing, you are acknowledging that this After Visit Summary has been reviewed with you and you have received a copy. Patient Signature:  ____________________________________________________________ Date:  ____________________________________________________________  
  
Katerin Sue Provider Signature:  ____________________________________________________________ Date:  ____________________________________________________________

## 2018-06-04 ENCOUNTER — HOSPITAL ENCOUNTER (OUTPATIENT)
Dept: ULTRASOUND IMAGING | Age: 63
Discharge: HOME | End: 2018-06-04
Attending: SURGERY
Payer: MEDICARE

## 2018-06-04 DIAGNOSIS — M79.662 PAIN OF LEFT CALF: ICD-10-CM

## 2018-06-04 PROCEDURE — 93971 EXTREMITY STUDY: CPT

## 2018-10-29 ENCOUNTER — HOSPITAL ENCOUNTER (OUTPATIENT)
Dept: MAMMOGRAPHY | Age: 63
Discharge: HOME | End: 2018-10-29
Attending: FAMILY MEDICINE
Payer: MEDICARE

## 2018-10-29 DIAGNOSIS — Z12.31 ENCOUNTER FOR SCREENING MAMMOGRAM FOR BREAST CANCER: ICD-10-CM

## 2018-10-29 PROBLEM — J45.909 ASTHMA: Status: ACTIVE | Noted: 2018-02-20

## 2018-10-29 PROBLEM — S42.251A CLOSED FRACTURE OF GREATER TUBEROSITY OF RIGHT HUMERUS: Status: ACTIVE | Noted: 2018-08-01

## 2018-10-29 PROBLEM — K76.9 LESION OF LIVER: Status: ACTIVE | Noted: 2018-10-29

## 2018-10-29 PROBLEM — M19.011 OSTEOARTHRITIS OF RIGHT ACROMIOCLAVICULAR JOINT: Status: ACTIVE | Noted: 2018-07-24

## 2018-10-29 PROBLEM — M75.101 TEAR OF RIGHT ROTATOR CUFF: Status: ACTIVE | Noted: 2018-07-24

## 2018-10-29 PROCEDURE — 77067 SCR MAMMO BI INCL CAD: CPT

## 2018-11-08 ENCOUNTER — PATIENT OUTREACH (OUTPATIENT)
Dept: CASE MANAGEMENT | Age: 63
End: 2018-11-08

## 2018-11-08 NOTE — PROGRESS NOTES
Pt identified as MSSP Risk Level 4. Community Care Manager reviewed chart to evaluate for possible Complex Care Management needs. No complex care needs identified at present. Please consult community care management team if future needs arise. This note will not be viewable in [address]

## 2019-02-03 PROBLEM — F51.01 PRIMARY INSOMNIA: Status: ACTIVE | Noted: 2019-02-03

## 2019-02-03 PROBLEM — E66.9 OBESITY, CLASS I, BMI 30.0-34.9 (SEE ACTUAL BMI): Status: ACTIVE | Noted: 2018-04-17

## 2019-05-06 PROBLEM — Z00.00 MEDICARE ANNUAL WELLNESS VISIT, SUBSEQUENT: Status: ACTIVE | Noted: 2019-05-06

## 2019-05-06 PROBLEM — J30.1 ACUTE SEASONAL ALLERGIC RHINITIS DUE TO POLLEN: Status: ACTIVE | Noted: 2019-05-06

## 2019-05-06 PROBLEM — Z91.09 ENVIRONMENTAL ALLERGIES: Status: ACTIVE | Noted: 2019-05-06

## 2019-05-06 PROBLEM — M81.0 OSTEOPOROSIS: Status: ACTIVE | Noted: 2019-05-06

## 2019-05-06 PROBLEM — M16.10 ARTHRITIS, HIP: Status: ACTIVE | Noted: 2019-05-06

## 2019-05-06 PROBLEM — J44.9 CHRONIC OBSTRUCTIVE PULMONARY DISEASE (HCC): Status: ACTIVE | Noted: 2019-05-06

## 2019-05-06 PROBLEM — M79.7 FIBROMYALGIA: Status: ACTIVE | Noted: 2019-05-06

## 2019-05-06 PROBLEM — E78.5 HYPERLIPIDEMIA: Status: ACTIVE | Noted: 2019-05-06

## 2019-05-06 PROBLEM — Z98.84 S/P GASTRIC BYPASS: Status: ACTIVE | Noted: 2019-05-06

## 2019-05-06 PROBLEM — Z71.89 ACP (ADVANCE CARE PLANNING): Status: ACTIVE | Noted: 2019-05-06

## 2019-05-06 PROBLEM — Z86.718 HISTORY OF DVT (DEEP VEIN THROMBOSIS): Status: ACTIVE | Noted: 2019-05-06

## 2019-11-05 ENCOUNTER — PATIENT OUTREACH (OUTPATIENT)
Dept: CASE MANAGEMENT | Age: 64
End: 2019-11-05

## 2019-11-06 NOTE — PROGRESS NOTES
Left message with my contact information requesting a call back. This note will not be viewable in [address]

## 2019-11-20 ENCOUNTER — PATIENT OUTREACH (OUTPATIENT)
Dept: CASE MANAGEMENT | Age: 64
End: 2019-11-20

## 2019-11-21 ENCOUNTER — PATIENT OUTREACH (OUTPATIENT)
Dept: CASE MANAGEMENT | Age: 64
End: 2019-11-21

## 2020-01-09 PROBLEM — Z71.3 DIETARY COUNSELING: Status: ACTIVE | Noted: 2020-01-09

## 2020-01-09 PROBLEM — Z86.711 HISTORY OF PULMONARY EMBOLUS (PE): Status: ACTIVE | Noted: 2020-01-09

## 2020-01-09 PROBLEM — M54.40 ACUTE RIGHT-SIDED LOW BACK PAIN WITH SCIATICA: Status: ACTIVE | Noted: 2020-01-09

## 2020-02-14 ENCOUNTER — HOSPITAL ENCOUNTER (OUTPATIENT)
Dept: GENERAL RADIOLOGY | Age: 65
Discharge: HOME | End: 2020-02-14
Payer: MEDICARE

## 2020-02-14 ENCOUNTER — HOSPITAL ENCOUNTER (OUTPATIENT)
Dept: LAB | Age: 65
Discharge: HOME | End: 2020-02-14
Payer: MEDICARE

## 2020-02-14 DIAGNOSIS — D86.9 SARCOIDOSIS: ICD-10-CM

## 2020-02-14 LAB
BASOPHILS # BLD: 0 K/UL (ref 0–0.2)
BASOPHILS NFR BLD: 1 % (ref 0–2)
DIFFERENTIAL METHOD BLD: ABNORMAL
EOSINOPHIL # BLD: 0.1 K/UL (ref 0–0.8)
EOSINOPHIL NFR BLD: 1 % (ref 0.5–7.8)
ERYTHROCYTE [DISTWIDTH] IN BLOOD BY AUTOMATED COUNT: 13 % (ref 11.9–14.6)
HCT VFR BLD AUTO: 41.9 % (ref 35.8–46.3)
HGB BLD-MCNC: 13.1 G/DL (ref 11.7–15.4)
IMM GRANULOCYTES # BLD AUTO: 0 K/UL (ref 0–0.5)
IMM GRANULOCYTES NFR BLD AUTO: 0 % (ref 0–5)
LYMPHOCYTES # BLD: 3 K/UL (ref 0.5–4.6)
LYMPHOCYTES NFR BLD: 43 % (ref 13–44)
MCH RBC QN AUTO: 26.7 PG (ref 26.1–32.9)
MCHC RBC AUTO-ENTMCNC: 31.3 G/DL (ref 31.4–35)
MCV RBC AUTO: 85.5 FL (ref 79.6–97.8)
MONOCYTES # BLD: 0.4 K/UL (ref 0.1–1.3)
MONOCYTES NFR BLD: 6 % (ref 4–12)
NEUTS SEG # BLD: 3.3 K/UL (ref 1.7–8.2)
NEUTS SEG NFR BLD: 49 % (ref 43–78)
NRBC # BLD: 0 K/UL (ref 0–0.2)
PLATELET # BLD AUTO: 288 K/UL (ref 150–450)
PMV BLD AUTO: 9.8 FL (ref 9.4–12.3)
RBC # BLD AUTO: 4.9 M/UL (ref 4.05–5.2)
WBC # BLD AUTO: 6.9 K/UL (ref 4.3–11.1)

## 2020-02-14 PROCEDURE — 85025 COMPLETE CBC W/AUTO DIFF WBC: CPT

## 2020-02-14 PROCEDURE — 36415 COLL VENOUS BLD VENIPUNCTURE: CPT

## 2020-02-14 PROCEDURE — 71046 X-RAY EXAM CHEST 2 VIEWS: CPT

## 2020-02-29 NOTE — ANESTHESIA PREPROCEDURE EVALUATION
Anesthetic History               Review of Systems / Medical History  Patient summary reviewed and pertinent labs reviewed    Pulmonary        Sleep apnea: CPAP    Asthma : well controlled       Neuro/Psych         Psychiatric history (Depression and anxiety)     Cardiovascular    Hypertension: well controlled              Exercise tolerance: >4 METS  Comments: Denies recent CP, SOB or changes in functional status  Nml ECHO and stress test in 2016   GI/Hepatic/Renal     GERD: well controlled      PUD and liver disease (Fatty)     Endo/Other        Morbid obesity and arthritis     Other Findings   Comments: Fibromyalgia  RLS  DVT           Physical Exam    Airway  Mallampati: II  TM Distance: 4 - 6 cm  Neck ROM: normal range of motion   Mouth opening: Normal     Cardiovascular    Rhythm: regular  Rate: normal         Dental    Dentition: Full upper dentures     Pulmonary  Breath sounds clear to auscultation               Abdominal  GI exam deferred       Other Findings            Anesthetic Plan    ASA: 3  Anesthesia type: general          Induction: Intravenous  Anesthetic plan and risks discussed with: Patient
temporal

## 2020-06-03 ENCOUNTER — HOSPITAL ENCOUNTER (OUTPATIENT)
Dept: CT IMAGING | Age: 65
Discharge: HOME | End: 2020-06-03
Attending: INTERNAL MEDICINE

## 2020-06-03 ENCOUNTER — HOSPITAL ENCOUNTER (OUTPATIENT)
Dept: MAMMOGRAPHY | Age: 65
Discharge: HOME | End: 2020-06-03
Attending: FAMILY MEDICINE
Payer: MEDICARE

## 2020-06-03 DIAGNOSIS — R91.1 PULMONARY NODULE: ICD-10-CM

## 2020-06-03 DIAGNOSIS — M81.0 OSTEOPOROSIS, UNSPECIFIED OSTEOPOROSIS TYPE, UNSPECIFIED PATHOLOGICAL FRACTURE PRESENCE: ICD-10-CM

## 2020-06-03 PROCEDURE — 77080 DXA BONE DENSITY AXIAL: CPT

## 2020-06-09 NOTE — PROGRESS NOTES
I'm covering for Dr. Kory Velasquez while she is on vacation. Please let patient know that radiologist has reviewed CT scan--previously reviewed by Dr. Karen Martinez evidence of any lung nodules. There's an incidental finding of multiple liver cysts--nothing to do about those.

## 2020-06-10 NOTE — PROGRESS NOTES
Patient was notified that radiologist has reviewed CT scan--previously reviewed by Dr. Sylwia Jo evidence of any lung nodules. There's an incidental finding of multiple liver cysts--nothing to do about those. Patient verbalized understanding.

## 2020-09-03 ENCOUNTER — HOSPITAL ENCOUNTER (OUTPATIENT)
Dept: LAB | Age: 65
Discharge: HOME | End: 2020-09-03
Payer: MEDICARE

## 2020-09-03 ENCOUNTER — HOSPITAL ENCOUNTER (OUTPATIENT)
Dept: GENERAL RADIOLOGY | Age: 65
Discharge: HOME | End: 2020-09-03
Attending: FAMILY MEDICINE
Payer: MEDICARE

## 2020-09-03 DIAGNOSIS — E66.9 OBESITY, CLASS I, BMI 30.0-34.9 (SEE ACTUAL BMI): ICD-10-CM

## 2020-09-03 DIAGNOSIS — E78.5 HYPERLIPIDEMIA, UNSPECIFIED HYPERLIPIDEMIA TYPE: ICD-10-CM
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
## 2020-09-03 DIAGNOSIS — G47.33 OSA (OBSTRUCTIVE SLEEP APNEA): ICD-10-CM

## 2020-09-03 DIAGNOSIS — Z91.09 ENVIRONMENTAL ALLERGIES: ICD-10-CM

## 2020-09-03 DIAGNOSIS — S40.012A CONTUSION OF LEFT SHOULDER, INITIAL ENCOUNTER: ICD-10-CM

## 2020-09-03 DIAGNOSIS — K21.9 GASTROESOPHAGEAL REFLUX DISEASE, ESOPHAGITIS PRESENCE NOT SPECIFIED: ICD-10-CM

## 2020-09-03 DIAGNOSIS — J44.9 CHRONIC OBSTRUCTIVE PULMONARY DISEASE, UNSPECIFIED COPD TYPE (HCC): ICD-10-CM

## 2020-09-03 DIAGNOSIS — Z71.3 DIETARY COUNSELING: ICD-10-CM

## 2020-09-03 DIAGNOSIS — I27.20 PULMONARY HYPERTENSION, MILD (HCC): ICD-10-CM

## 2020-09-03 DIAGNOSIS — Z98.84 S/P GASTRIC BYPASS: ICD-10-CM

## 2020-09-03 DIAGNOSIS — M81.0 OSTEOPOROSIS, UNSPECIFIED OSTEOPOROSIS TYPE, UNSPECIFIED PATHOLOGICAL FRACTURE PRESENCE: ICD-10-CM

## 2020-09-03 DIAGNOSIS — Z86.718 HISTORY OF DVT (DEEP VEIN THROMBOSIS): ICD-10-CM

## 2020-09-03 LAB
ALBUMIN SERPL-MCNC: 4 G/DL (ref 3.2–4.6)
ALBUMIN/GLOB SERPL: 1.3 {RATIO} (ref 1.2–3.5)
ALP SERPL-CCNC: 99 U/L (ref 50–136)
ALT SERPL-CCNC: 23 U/L (ref 12–65)
ANION GAP SERPL CALC-SCNC: 5 MMOL/L (ref 7–16)
AST SERPL-CCNC: 23 U/L (ref 15–37)
BASOPHILS # BLD: 0.1 K/UL (ref 0–0.2)
BASOPHILS NFR BLD: 1 % (ref 0–2)
BILIRUB SERPL-MCNC: 0.6 MG/DL (ref 0.2–1.1)
BUN SERPL-MCNC: 18 MG/DL (ref 8–23)
CALCIUM SERPL-MCNC: 9 MG/DL (ref 8.3–10.4)
CHLORIDE SERPL-SCNC: 105 MMOL/L (ref 98–107)
CHOLEST SERPL-MCNC: 218 MG/DL
CO2 SERPL-SCNC: 28 MMOL/L (ref 21–32)
CREAT SERPL-MCNC: 0.96 MG/DL (ref 0.6–1)
DIFFERENTIAL METHOD BLD: NORMAL
EOSINOPHIL # BLD: 0.1 K/UL (ref 0–0.8)
EOSINOPHIL NFR BLD: 2 % (ref 0.5–7.8)
ERYTHROCYTE [DISTWIDTH] IN BLOOD BY AUTOMATED COUNT: 13.9 % (ref 11.9–14.6)
GLOBULIN SER CALC-MCNC: 3 G/DL (ref 2.3–3.5)
GLUCOSE SERPL-MCNC: 88 MG/DL (ref 65–100)
HCT VFR BLD AUTO: 39.3 % (ref 35.8–46.3)
HDLC SERPL-MCNC: 56 MG/DL (ref 40–60)
HDLC SERPL: 3.9 {RATIO}
HGB BLD-MCNC: 12.4 G/DL (ref 11.7–15.4)
IMM GRANULOCYTES # BLD AUTO: 0 K/UL (ref 0–0.5)
IMM GRANULOCYTES NFR BLD AUTO: 0 % (ref 0–5)
LDLC SERPL CALC-MCNC: 132.4 MG/DL
LIPID PROFILE,FLP: ABNORMAL
LYMPHOCYTES # BLD: 3.2 K/UL (ref 0.5–4.6)
LYMPHOCYTES NFR BLD: 44 % (ref 13–44)
MCH RBC QN AUTO: 26.9 PG (ref 26.1–32.9)
MCHC RBC AUTO-ENTMCNC: 31.6 G/DL (ref 31.4–35)
MCV RBC AUTO: 85.2 FL (ref 79.6–97.8)
MONOCYTES # BLD: 0.3 K/UL (ref 0.1–1.3)
MONOCYTES NFR BLD: 5 % (ref 4–12)
NEUTS SEG # BLD: 3.5 K/UL (ref 1.7–8.2)
NEUTS SEG NFR BLD: 49 % (ref 43–78)
NRBC # BLD: 0 K/UL (ref 0–0.2)
PLATELET # BLD AUTO: 267 K/UL (ref 150–450)
PMV BLD AUTO: 10.1 FL (ref 9.4–12.3)
POTASSIUM SERPL-SCNC: 4.4 MMOL/L (ref 3.5–5.1)
PROT SERPL-MCNC: 7 G/DL (ref 6.3–8.2)
RBC # BLD AUTO: 4.61 M/UL (ref 4.05–5.2)
SODIUM SERPL-SCNC: 138 MMOL/L (ref 136–145)
TRIGL SERPL-MCNC: 148 MG/DL (ref 35–150)
VLDLC SERPL CALC-MCNC: 29.6 MG/DL (ref 6–23)
WBC # BLD AUTO: 7.3 K/UL (ref 4.3–11.1)

## 2020-09-03 PROCEDURE — 73030 X-RAY EXAM OF SHOULDER: CPT

## 2020-09-03 PROCEDURE — 85025 COMPLETE CBC W/AUTO DIFF WBC: CPT

## 2020-09-03 PROCEDURE — 80053 COMPREHEN METABOLIC PANEL: CPT

## 2020-09-03 PROCEDURE — 36415 COLL VENOUS BLD VENIPUNCTURE: CPT

## 2020-09-03 PROCEDURE — 80061 LIPID PANEL: CPT
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
## 2020-09-04 PROBLEM — S40.012A CONTUSION OF LEFT SHOULDER: Status: ACTIVE | Noted: 2020-09-04

## 2020-09-17 PROBLEM — M25.512 LEFT SHOULDER PAIN: Status: ACTIVE | Noted: 2020-09-17

## 2020-09-17 PROBLEM — M25.522 LEFT ELBOW PAIN: Status: ACTIVE | Noted: 2020-09-17

## 2020-09-17 PROBLEM — M54.2 CERVICALGIA: Status: ACTIVE | Noted: 2020-09-17

## 2020-09-17 PROBLEM — M54.10 RADICULOPATHY AFFECTING UPPER EXTREMITY: Status: ACTIVE | Noted: 2020-09-17

## 2020-10-26 ENCOUNTER — HOSPITAL ENCOUNTER (OUTPATIENT)
Dept: GENERAL RADIOLOGY | Age: 65
Discharge: HOME | End: 2020-10-26
Payer: MEDICARE

## 2020-10-26 DIAGNOSIS — M54.2 CERVICALGIA: ICD-10-CM

## 2020-10-26 DIAGNOSIS — M54.10 RADICULOPATHY AFFECTING UPPER EXTREMITY: ICD-10-CM

## 2020-10-26 DIAGNOSIS — M25.522 LEFT ELBOW PAIN: ICD-10-CM

## 2020-10-26 PROCEDURE — 73080 X-RAY EXAM OF ELBOW: CPT

## 2020-10-26 PROCEDURE — 72050 X-RAY EXAM NECK SPINE 4/5VWS: CPT

## 2020-11-02 NOTE — PROGRESS NOTES
Pt has mild arthritis in neck and elbow on x-ray--  Rest  Ice locally  Pain meds  Gentle home exercises to stretch neck and elbow

## 2021-04-14 PROBLEM — J32.9 SINUSITIS: Status: ACTIVE | Noted: 2021-04-14

## 2021-04-29 ENCOUNTER — HOSPITAL ENCOUNTER (OUTPATIENT)
Dept: LAB | Age: 66
Discharge: HOME | End: 2021-04-29
Attending: FAMILY MEDICINE
Payer: MEDICARE

## 2021-04-29 LAB
ALBUMIN SERPL-MCNC: 3.8 G/DL (ref 3.2–4.6)
ALBUMIN/GLOB SERPL: 1.2 {RATIO} (ref 1.2–3.5)
ALP SERPL-CCNC: 82 U/L (ref 50–136)
ALT SERPL-CCNC: 28 U/L (ref 12–65)
ANION GAP SERPL CALC-SCNC: 1 MMOL/L (ref 7–16)
AST SERPL-CCNC: 29 U/L (ref 15–37)
BILIRUB SERPL-MCNC: 1.2 MG/DL (ref 0.2–1.1)
BUN SERPL-MCNC: 14 MG/DL (ref 8–23)
CALCIUM SERPL-MCNC: 9.6 MG/DL (ref 8.3–10.4)
CHLORIDE SERPL-SCNC: 105 MMOL/L (ref 98–107)
CHOLEST SERPL-MCNC: 126 MG/DL
CO2 SERPL-SCNC: 34 MMOL/L (ref 21–32)
CREAT SERPL-MCNC: 0.77 MG/DL (ref 0.6–1)
GLOBULIN SER CALC-MCNC: 3.3 G/DL (ref 2.3–3.5)
GLUCOSE SERPL-MCNC: 72 MG/DL (ref 65–100)
HDLC SERPL-MCNC: 67 MG/DL (ref 40–60)
HDLC SERPL: 1.9 {RATIO}
LDLC SERPL CALC-MCNC: 44.6 MG/DL
LIPID PROFILE,FLP: ABNORMAL
POTASSIUM SERPL-SCNC: 3.6 MMOL/L (ref 3.5–5.1)
PROT SERPL-MCNC: 7.1 G/DL (ref 6.3–8.2)
SODIUM SERPL-SCNC: 140 MMOL/L (ref 136–145)
TRIGL SERPL-MCNC: 72 MG/DL (ref 35–150)
VLDLC SERPL CALC-MCNC: 14.4 MG/DL (ref 6–23)

## 2021-04-29 PROCEDURE — 36415 COLL VENOUS BLD VENIPUNCTURE: CPT

## 2021-04-29 PROCEDURE — 80061 LIPID PANEL: CPT

## 2021-04-29 PROCEDURE — 80053 COMPREHEN METABOLIC PANEL: CPT

## 2021-07-30 PROBLEM — Z01.818 PREOPERATIVE CLEARANCE: Status: ACTIVE | Noted: 2021-07-30

## 2021-08-03 ENCOUNTER — HOSPITAL ENCOUNTER (OUTPATIENT)
Dept: REHABILITATION | Age: 66
Discharge: HOME | End: 2021-08-03
Payer: MEDICARE

## 2021-08-03 ENCOUNTER — HOSPITAL ENCOUNTER (OUTPATIENT)
Dept: SURGERY | Age: 66
Discharge: HOME | End: 2021-08-03
Payer: MEDICARE

## 2021-08-03 VITALS
DIASTOLIC BLOOD PRESSURE: 70 MMHG | TEMPERATURE: 97.9 F | SYSTOLIC BLOOD PRESSURE: 118 MMHG | HEART RATE: 77 BPM | WEIGHT: 194 LBS | BODY MASS INDEX: 32.32 KG/M2 | OXYGEN SATURATION: 100 % | HEIGHT: 65 IN

## 2021-08-03 LAB
ANION GAP SERPL CALC-SCNC: 4 MMOL/L (ref 7–16)
APTT PPP: 32.5 SEC (ref 24.1–35.1)
BACTERIA SPEC CULT: NORMAL
BASOPHILS # BLD: 0 K/UL (ref 0–0.2)
BASOPHILS NFR BLD: 0 % (ref 0–2)
BUN SERPL-MCNC: 10 MG/DL (ref 8–23)
CALCIUM SERPL-MCNC: 9.2 MG/DL (ref 8.3–10.4)
CHLORIDE SERPL-SCNC: 106 MMOL/L (ref 98–107)
CO2 SERPL-SCNC: 29 MMOL/L (ref 21–32)
CREAT SERPL-MCNC: 0.77 MG/DL (ref 0.6–1)
DIFFERENTIAL METHOD BLD: ABNORMAL
EOSINOPHIL # BLD: 0.1 K/UL (ref 0–0.8)
EOSINOPHIL NFR BLD: 1 % (ref 0.5–7.8)
ERYTHROCYTE [DISTWIDTH] IN BLOOD BY AUTOMATED COUNT: 15 % (ref 11.9–14.6)
EST. AVERAGE GLUCOSE BLD GHB EST-MCNC: 100 MG/DL
GLUCOSE SERPL-MCNC: 70 MG/DL (ref 65–100)
HBA1C MFR BLD: 5.1 % (ref 4.2–6.3)
HCT VFR BLD AUTO: 43 % (ref 35.8–46.3)
HGB BLD-MCNC: 14.1 G/DL (ref 11.7–15.4)
IMM GRANULOCYTES # BLD AUTO: 0 K/UL (ref 0–0.5)
IMM GRANULOCYTES NFR BLD AUTO: 0 % (ref 0–5)
INR PPP: 1
LYMPHOCYTES # BLD: 2.7 K/UL (ref 0.5–4.6)
LYMPHOCYTES NFR BLD: 29 % (ref 13–44)
MCH RBC QN AUTO: 28 PG (ref 26.1–32.9)
MCHC RBC AUTO-ENTMCNC: 32.8 G/DL (ref 31.4–35)
MCV RBC AUTO: 85.5 FL (ref 79.6–97.8)
MONOCYTES # BLD: 0.6 K/UL (ref 0.1–1.3)
MONOCYTES NFR BLD: 6 % (ref 4–12)
NEUTS SEG # BLD: 5.7 K/UL (ref 1.7–8.2)
NEUTS SEG NFR BLD: 63 % (ref 43–78)
NRBC # BLD: 0 K/UL (ref 0–0.2)
PLATELET # BLD AUTO: 284 K/UL (ref 150–450)
PMV BLD AUTO: 9.7 FL (ref 9.4–12.3)
POTASSIUM SERPL-SCNC: 3.8 MMOL/L (ref 3.5–5.1)
PROTHROMBIN TIME: 13.5 SEC (ref 12.5–14.7)
RBC # BLD AUTO: 5.03 M/UL (ref 4.05–5.2)
SERVICE CMNT-IMP: NORMAL
SODIUM SERPL-SCNC: 139 MMOL/L (ref 136–145)
WBC # BLD AUTO: 9 K/UL (ref 4.3–11.1)

## 2021-08-03 PROCEDURE — 93005 ELECTROCARDIOGRAM TRACING: CPT | Performed by: ORTHOPAEDIC SURGERY

## 2021-08-03 PROCEDURE — 83036 HEMOGLOBIN GLYCOSYLATED A1C: CPT

## 2021-08-03 PROCEDURE — 87641 MR-STAPH DNA AMP PROBE: CPT

## 2021-08-03 PROCEDURE — 85025 COMPLETE CBC W/AUTO DIFF WBC: CPT

## 2021-08-03 PROCEDURE — 36415 COLL VENOUS BLD VENIPUNCTURE: CPT

## 2021-08-03 PROCEDURE — 85610 PROTHROMBIN TIME: CPT

## 2021-08-03 PROCEDURE — 94760 N-INVAS EAR/PLS OXIMETRY 1: CPT

## 2021-08-03 PROCEDURE — 80048 BASIC METABOLIC PNL TOTAL CA: CPT

## 2021-08-03 PROCEDURE — 85730 THROMBOPLASTIN TIME PARTIAL: CPT

## 2021-08-03 PROCEDURE — 77030027138 HC INCENT SPIROMETER -A

## 2021-08-03 PROCEDURE — 97161 PT EVAL LOW COMPLEX 20 MIN: CPT

## 2021-08-03 NOTE — PERIOP NOTES
PLEASE CONTINUE TAKING ALL PRESCRIPTION MEDICATIONS UP TO THE DAY OF SURGERY UNLESS OTHERWISE DIRECTED BELOW. DISCONTINUE all vitamins, herbals, and supplements 7 days prior to surgery. DISCONTINUE Non-Steriodal Anti-Inflammatory (NSAIDS) such as Advil, Ibuprofen, Motrin, Aspirin, Naproxen, and Aleve FIVE days prior to surgery. Home Medications to take  the day of surgery (8/24/21)   1-Xanax if needed  2-Nasal spray if needed  3-Loratadine if needed  4-Pantoprazole   5-Venlafaxine            Home Medications   to Hold           Comments   Bring: Reba-hex soap, Incentive Spirometer, Photo ID, Insurance card    On the day before surgery (8/23/21)  please take Acetaminophen 1000mg in the morning and then again before bed. You may substitute for Tylenol 650 mg. Please do not bring home medications with you on the day of surgery unless otherwise directed by your nurse. If you are instructed to bring home medications, please give them to your nurse as they will be administered by the nursing staff. If you have any questions, please call [facility] [phone] or [facility] [phone]. A copy of this note was provided to the patient for reference.

## 2021-08-03 NOTE — PERIOP NOTES
Patient verified name and . Order for consent found in EHR and matches case posting; patient verified. Type 3 surgery, joint camp assessment complete. Labs per surgeon: CBC, BMP, PT/PTT, HGB-A1C ; results within anesthesia protocols. Labs per anesthesia protocol: no additional labs needed. EKG: completed today; within anesthesia protocols. Stress test (16), Echo (16), [facility] note (2020), and PCP office note (21) available in EHR for reference. Patient COVID-19 vaccination series complete. Instructed to bring vaccine record dos. MRSA/MSSA swab collected; pharmacy to review and dose antibiotic as appropriate. Hospital approved surgical skin cleanser and instructions to return bottle on DOS given per hospital policy. Patient provided with handouts including Guide to Surgery, Pain Management, Hand Hygiene, Blood Transfusion Education, and [facility]ch[facility]. Patient answered medical/surgical history questions at their best of ability. All prior to admission medications documented in [facility]. Original medication prescription bottles NOT visualized during patient appointment. Patient instructed to hold all vitamins 3 weeks prior to surgery and NSAIDS 5 days prior to surgery. Patient teach back successful and patient demonstrates knowledge of instruction.

## 2021-08-03 NOTE — PERIOP NOTES
Jeremy Hodge MD    Your patient recently had labs drawn during a hospital appointment due to an upcoming surgery. The results are attached. If you have any questions or concerns please reach out to your patient for a follow-up in your office. Please do not respond to this message as my mailbox is not monitored. You may call [phone] with questions or concerns. Recent Results (from the past 12 hour(s))   CBC WITH AUTOMATED DIFF    Collection Time: 08/03/21 11:50 AM   Result Value Ref Range    WBC 9.0 4.3 - 11.1 K/uL    RBC 5.03 4.05 - 5.2 M/uL    HGB 14.1 11.7 - 15.4 g/dL    HCT 43.0 35.8 - 46.3 %    MCV 85.5 79.6 - 97.8 FL    MCH 28.0 26.1 - 32.9 PG    MCHC 32.8 31.4 - 35.0 g/dL    RDW 15.0 (H) 11.9 - 14.6 %    PLATELET 754 199 - 272 K/uL    MPV 9.7 9.4 - 12.3 FL    ABSOLUTE NRBC 0.00 0.0 - 0.2 K/uL    DF AUTOMATED      NEUTROPHILS 63 43 - 78 %    LYMPHOCYTES 29 13 - 44 %    MONOCYTES 6 4.0 - 12.0 %    EOSINOPHILS 1 0.5 - 7.8 %    BASOPHILS 0 0.0 - 2.0 %    IMMATURE GRANULOCYTES 0 0.0 - 5.0 %    ABS. NEUTROPHILS 5.7 1.7 - 8.2 K/UL    ABS. LYMPHOCYTES 2.7 0.5 - 4.6 K/UL    ABS. MONOCYTES 0.6 0.1 - 1.3 K/UL    ABS. EOSINOPHILS 0.1 0.0 - 0.8 K/UL    ABS. BASOPHILS 0.0 0.0 - 0.2 K/UL    ABS. IMM.  GRANS. 0.0 0.0 - 0.5 K/UL   HEMOGLOBIN A1C WITH EAG    Collection Time: 08/03/21 11:50 AM   Result Value Ref Range    Hemoglobin A1c 5.1 4.2 - 6.3 %    Est. average glucose 027 mg/dL   METABOLIC PANEL, BASIC    Collection Time: 08/03/21 11:50 AM   Result Value Ref Range    Sodium 139 136 - 145 mmol/L    Potassium 3.8 3.5 - 5.1 mmol/L    Chloride 106 98 - 107 mmol/L    CO2 29 21 - 32 mmol/L    Anion gap 4 (L) 7 - 16 mmol/L    Glucose 70 65 - 100 mg/dL    BUN 10 8 - 23 MG/DL    Creatinine 0.77 0.6 - 1.0 MG/DL    GFR est AA >60 >60 ml/min/1.73m2    GFR est non-AA >60 >60 ml/min/1.73m2    Calcium 9.2 8.3 - 10.4 MG/DL   PROTHROMBIN TIME + INR    Collection Time: 08/03/21 11:50 AM   Result Value Ref Range Prothrombin time 13.5 12.5 - 14.7 sec    INR 1.0     PTT    Collection Time: 08/03/21 11:50 AM   Result Value Ref Range    aPTT 32.5 24.1 - 35.1 SEC

## 2021-08-03 NOTE — PROGRESS NOTES
Lindenadarsh Alen  : [birth year](37 y.o.) Joint [facility] at [place] [address] [facility], [facility] [address].  [phone]       Physical Therapy Prehab Plan of Treatment and Evaluation Summary:8/3/2021    ICD-10: Treatment Diagnosis:   · Pain in Right Knee (M25.561)  · Stiffness of Right Knee, Not elsewhere classified (M25.661)  · Difficulty in walking, Not elsewhere classified (R26.2)  · Other abnormalities of gait and mobility (R26.89)  Precautions/Allergies:   Codeine  MEDICAL/REFERRING DIAGNOSIS:  Pain in right knee [M25.561]  Unilateral primary osteoarthritis, right knee [M17.11]  REFERRING PHYSICIAN: Demetria Briggs MD  DATE OF SURGERY: 2021    Assessment:   Comments:  Pt presents with pain in right knee. Pt's  present for parts of session, as well. Pt plans to return home following hospital stay. Pt motivated and prepared for surgery. PROBLEM LIST (Impacting functional limitations):  Ms. Tigre Nagy presents with the following right lower extremity(s) problems:  1. Transfers  2. Gait  3. Strength  4. Range of Motion  5. Pain   INTERVENTIONS PLANNED:  1. Home Exercise Program  2. Educational Discussion      TREATMENT PLAN: Effective Dates: 8/3/2021 TO 8/3/2021. Frequency/Duration: Patient to continue to perform home exercise program at least twice per day up until her surgery. GOALS: (Goals have been discussed and agreed upon with patient.)  Discharge Goals: Time Frame: 1 Day  1. Patient will demonstrate independence with a home exercise program designed to increase strength, range of motion and pain control to minimize functional deficits and optimize patient for total joint replacement. Rehabilitation Potential For Stated Goals: Good  Regarding [facility] therapy, I certify that the treatment plan above will be carried out by a therapist or under their direction.   Thank you for this referral,  Jennifer Garza PT               HISTORY:   Present Symptoms:  Pain Intensity 1: 7  Pain Location 1: Knee  Pain Orientation 1: Right   History of Present Injury/Illness (Reason for Referral):  Medical/Referring Diagnosis: Pain in right knee [M25.561]  Unilateral primary osteoarthritis, right knee [M17.11]   Past Medical History/Comorbidities:   Ms. Jf Keith  has a past medical history of Allergic rhinitis, Anxiety, Arthritis, Asthma, Celiac disease, COVID-19 vaccine series completed (3/30/21, 4/29/21), Depression, DVT (deep vein thrombosis) in pregnancy (2015), Dysthymic disorder, Esophageal reflux, Exophthalmos, Fibromyalgia, GERD (gastroesophageal reflux disease), Hiatal hernia, Hot flashes, Hypercholesterolemia, Hyperlipidemia, Hypertension, IBS (irritable bowel syndrome), SABIHA (iron deficiency anemia), Impaired fasting glucose, Liver disease, Lumbago, Malaise and fatigue, Morbid obesity ([address] Nonallopathic lesion of sacral region, KELLY (obstructive sleep apnea), Osteoarthritis, Osteoporosis, Osteoporosis, Other speech disturbances, PUD (peptic ulcer disease), Pulmonary embolism ([address] (2015), Pulmonary hypertension, mild ([address] RA (rheumatoid arthritis) ([address] RLS (restless legs syndrome), Sarcoidosis, Sciatic neuralgia, Tarsal tunnel syndrome, Urinary incontinence, Wears glasses, and Wears partial dentures. Ms. Jf Keith  has a past surgical history that includes hx hysterectomy (1978); hx orthopaedic; hx tonsillectomy; hx carpal tunnel release (Right, 1997); hx cholecystectomy; hx gastrectomy (04/17/2018); hx other surgical (05/14/2018); hx other surgical (04/13/2018); hx rotator cuff repair (Right, 2018); and hx breast reduction. Social History/Living Environment:   Home Environment: Private residence  # Steps to Enter: 1  One/Two Story Residence: Two story, live on 1st floor  # of Interior Steps: 17  Living Alone: No  Support Systems: Child(josee); Friends \ neighbors  Patient Expects to be Discharged to[de-identified] [employer]  Current DME Used/Available at Home: [address] [facility], [address], straight  Tub or Shower Type: Tub/Shower combination    Work/Activity:  retired  Dominant Side:  RIGHT  Current Medications:  See Pre-assessment nursing note   Number of Personal Factors/Comorbidities that affect the Plan of Care: 0: LOW COMPLEXITY   EXAMINATION:   ADLs (Current Functional Status):   Ambulation:  [x] Independent  [] Walk Indoors Only  [] Walk Outdoors  [] Use Assistive Device  [] Use Wheelchair Only Dressing:  [x] Independent  Requires Assistance from Someone for:  [] Sock/Shoes  [] Pants  [] Everything   Bathing/Showering:   [x] Independent  [] Requires Assistance from Someone  [] Sponge Bath Only Household Activities:  [] Routine house and yard work  [x] Light Housework Only  [] None   Observation/Orthostatic Postural Assessment:   Within defined limits   ROM/Flexibility:   Gross Assessment: Yes  AROM: Generally decreased, functional                           Strength:   Gross Assessment: Yes  Strength: Generally decreased, functional                  Functional Mobility:    Gross Assessment: Yes  Sensation: Intact    Gait Description (WDL): Within defined limits  Stand to Sit: Independent  Sit to Stand: Independent  Distance (ft): 300 Feet (ft)  Ambulation - Level of Assistance: Independent  Gait Abnormalities: Antalgic          Balance:    Sitting: Intact  Standing: Intact   Body Structures Involved:  1. Bones  2. Joints  3. Muscles  4. Ligaments Body Functions Affected:  1. Neuromusculoskeletal  2. Movement Related Activities and Participation Affected:  1. Mobility   Number of elements that affect the Plan of Care: 4+: HIGH COMPLEXITY   CLINICAL PRESENTATION:   Presentation: Stable and uncomplicated: LOW COMPLEXITY   CLINICAL DECISION MAKING:   Outcome Measure:    Tool Used: Lower Extremity Functional Scale (LEFS)  Score:  Initial: 46/80 Most Recent: X/80 (Date: -- )   Interpretation of Score: 20 questions each scored on a 5 point scale with 0 representing \"extreme difficulty or unable to perform\" and 4 representing \"no difficulty\". The lower the score, the greater the functional disability. 80/80 represents no disability. Minimal detectable change is 9 points. Medical Necessity:   · Ms. Onel Connolly is expected to optimize her lower extremity strength and ROM in preparation for joint replacement surgery. Reason for Services/Other Comments:  · Achieve baseline assesment of musculoskeletal system, functional mobility and home environment. , educate in PT HEP in preparation for surgery, educate in hospital plan of care. Use of outcome tool(s) and clinical judgement create a POC that gives a: Clear prediction of patient's progress: LOW COMPLEXITY   TREATMENT:   Treatment/Session Assessment:  Patient was instructed in PT- HEP to increase strength and ROM in LEs. Answered all questions. · Post session pain:  7  · Compliance with Program/Exercises: compliant most of the time.   Total Treatment Duration:  PT Patient Time In/Time Out  Time In: 1200  Time Out: 1519 [facility], PT

## 2021-08-03 NOTE — PROGRESS NOTES
08/03/21 1200   Oxygen Therapy   O2 Sat (%) 98 %   Pulse via Oximetry 90 beats per minute   O2 Device None (Room air)   Pre-Treatment   Breath Sounds Bilateral Clear   Pre FEV1 (liters) 2.6 liters   % Predicted 122     Initial respiratory Assessment completed with pt. Pt was interviewed and evaluated in Joint camp prior to surgery. Patient ID:  Arleth Maldonado  [id number]  76 y.o.  1955  Surgeon: Dr. Rikki Osler  Date of Surgery: 8/24/2021  Procedure:  Total Right Knee Arthroplasty  Primary Care Physician: Julieta Gardiner [phone]  Specialists: DR Danna [facility]  6/4/2020    Pt taught proper COUGH technique  DIAPHRAGMATIC BREATHING EXERCISE INSTRUCTIONS GIVEN    History of smoking:   DENIES                 Quit date:         Secondhand smoke:DENIES    Past procedures with Oxygen desaturation or delayed awakening:DENIES    Past Medical History:   Diagnosis Date    Allergic rhinitis     Anxiety     Arthritis     osteo    Asthma     as a child~no inhalers     Celiac disease     denies    COVID-19 vaccine series completed 3/30/21, 4/29/21    Moderna     Depression     DVT (deep vein thrombosis) in pregnancy 2015    not in pregnancy     Dysthymic disorder     Esophageal reflux     daily med for control     Exophthalmos     Fibromyalgia     GERD (gastroesophageal reflux disease)     prontonix daily     Hiatal hernia     Hot flashes     Hypercholesterolemia     Hyperlipidemia     on med for control     Hypertension     no meds     IBS (irritable bowel syndrome)     SABIHA (iron deficiency anemia)     HX    Impaired fasting glucose     Liver disease     \"liver cyst\"    Lumbago     Malaise and fatigue     Morbid obesity (HCC)     BMI=32.3    Nonallopathic lesion of sacral region     KELLY (obstructive sleep apnea)     no c-pap; not since weight loss     Osteoarthritis     Osteoporosis     Osteoporosis     Other speech disturbances     PUD (peptic ulcer disease) denies    [address]monary embolism ([facility] 2015    took blood thinner for 3 months     Pulmonary hypertension, mild (HCC)     RA (rheumatoid arthritis) (HCC)     RLS (restless legs syndrome)     HX    Sarcoidosis     followed [facility]     Sciatic neuralgia     Tarsal tunnel syndrome     Urinary incontinence     Wears glasses     Wears partial dentures       HX OF SARCOIDOSIS, PULMONARY NODULE, PUL HTN, 12/5/2016 DEPENDENT ATELECTASIS ON CT SCAN RT LUNG  Respiratory history:                                 SOB  ON EXERTION                                    Respiratory meds:  DENIES    FAMILY PRESENT:             NO     PAST SLEEP STUDY:        YES     PT SAW DR Tessie Fishman AT [address] 11/28/2017               HX OF KELLY:                        YES                     KELLY assessment:     6/25/2014 AHI 5.0 REM 7.335 DESATURATIONS PT WAS TO HAVE CPAP TITRATION. T HAD GASTRIC SLEEVE AND STATES SHE LOS WEIGHT & DID FOLLOW UP STUDY AND DID NOT NEED CPAP ANYMORE. UNABLE TO LOCATE STUDY. DONE                                          SLEEPS ON SIDE         PHYSICAL EXAM   Body mass index is 32.28 kg/m².    Visit Vitals  /70 (BP 1 Location: Left upper arm, BP Patient Position: At rest;Sitting)   Pulse 77   Temp 97.9 °F (36.6 °C)   Ht 5' 5\" (1.651 m)   Wt 88 kg (194 lb)   SpO2 100%   BMI 32.28 kg/m²     Neck circumference:      cm    Loud snoring:                                                SOME  Witnessed apnea or wakening gasping or choking:        HX OF   APNEA  Awakens with headaches:                                               DENIES  Morning or daytime tiredness/ sleepiness:                      DENIES         Dry mouth or sore throat in morning:            YES                                              [place] stage:  3                                   SACS score:  Stop Bang   STOP-BANG  Does the patient snore loudly (louder than talking or loud enough to be heard through closed doors)?: Yes  Does the patient often feel tired, fatigued, or sleepy during the daytime, even after a \"good\" night's sleep?: No  Has anyone ever observed the patient stop breathing during their sleep? : Yes  Does the patient have or are they being treated for high blood pressure?: Yes  Is the patient's BMI greater than 35?: No  Is your neck circumference greater than 17 inches (Male) or 16 inches (Female)?: No  Is the patient older than 48?: Yes  Is the patient male?: No  KELLY Score: 4  Has the patient been referred to Sleep Medicine?: No  Has the patient previously been diagnosed with Obstructive Sleep Apnea?: Yes  Treated or Untreated?: Untreated                            CS HS   O2 AT 3 HS                                        Referrals:    Pt. Phone Number:

## 2021-08-04 PROBLEM — Z91.14 NONCOMPLIANCE WITH CPAP TREATMENT: Status: ACTIVE | Noted: 2021-08-04

## 2021-08-04 LAB
ATRIAL RATE: 75 BPM
CALCULATED P AXIS, ECG09: 61 DEGREES
CALCULATED R AXIS, ECG10: 32 DEGREES
CALCULATED T AXIS, ECG11: 33 DEGREES
DIAGNOSIS, 93000: NORMAL
P-R INTERVAL, ECG05: 140 MS
Q-T INTERVAL, ECG07: 378 MS
QRS DURATION, ECG06: 76 MS
QTC CALCULATION (BEZET), ECG08: 422 MS
VENTRICULAR RATE, ECG03: 75 BPM

## 2021-08-04 NOTE — ADVANCED PRACTICE NURSE
Total Joint Surgery Preoperative Chart Review      Patient ID:  Nakul Joaquin  [id number]  5 y.o.  1955  Surgeon: Dr. Kayleigh Alexandre  Date of Surgery: 8/24/2021  Procedure: Total Right Knee Arthroplasty  Primary Care Physician: Jacoby Chan [phone]  Specialty Physician(s):      Subjective:   Nakul Joaquin is a 76 y.o. BLACK/ female who presents for preoperative evaluation for Total Right Knee arthroplasty. This is a preoperative chart review note based on data collected by the nurse at the surgical Pre-Assessment visit.     Past Medical History:   Diagnosis Date    Allergic rhinitis     Anxiety     Arthritis     osteo    Asthma     as a child~no inhalers     Celiac disease     denies    COVID-19 vaccine series completed 3/30/21, 4/29/21    Moderna     Depression     DVT (deep vein thrombosis) in pregnancy 2015    not in pregnancy     Dysthymic disorder     Esophageal reflux     daily med for control     Exophthalmos     Fibromyalgia     GERD (gastroesophageal reflux disease)     prontonix daily     Hiatal hernia     Hot flashes     Hypercholesterolemia     Hyperlipidemia     on med for control     Hypertension     no meds     IBS (irritable bowel syndrome)     SABIHA (iron deficiency anemia)     HX    Impaired fasting glucose     Liver disease     \"liver cyst\"    Lumbago     Malaise and fatigue     Morbid obesity (HCC)     BMI=32.3    Nonallopathic lesion of sacral region     KELLY (obstructive sleep apnea)     no c-pap; not since weight loss     Osteoarthritis     Osteoporosis     Osteoporosis     Other speech disturbances     PUD (peptic ulcer disease)     denies    Pulmonary embolism ([facility] [address] 2015    took blood thinner for 3 months     Pulmonary hypertension, mild (HCC)     RA (rheumatoid arthritis) (HCC)     RLS (restless legs syndrome)     HX    Sarcoidosis     [facility]     Sciatic neuralgia     Tarsal tunnel syndrome     Urinary incontinence     Wears glasses     Wears partial dentures       Past Surgical History:   Procedure Laterality Date    HX BREAST REDUCTION      HX CARPAL TUNNEL RELEASE Right 1997    2 x    HX CHOLECYSTECTOMY      HX GASTRECTOMY  04/17/2018    gastric sleeve     HX HYSTERECTOMY  1978    HX ORTHOPAEDIC      right knee, right toe, right elbow     HX OTHER SURGICAL  05/14/2018    VENA CAVA FILTER REMOVAL     HX OTHER SURGICAL  04/13/2018    VENA CAVA FILTER INSERTION     HX ROTATOR CUFF REPAIR Right 2018    HX TONSILLECTOMY      as a child     Family History   Problem Relation Age of Onset    Pneumonia Mother     Diabetes Father     Cancer Father         colon ca    Colon Cancer Other     Cancer Other         lung     Other Other         fam hx of liver    Cancer Sister         pancreatic    Breast Cancer Neg Hx       Social History     Tobacco Use    Smoking status: Never Smoker    Smokeless tobacco: Never Used   Substance Use Topics    Alcohol use: Yes     Alcohol/week: 11.0 standard drinks     Types: 7 Glasses of wine, 4 Cans of beer per week       Prior to Admission medications    Medication Sig Start Date End Date Taking? Authorizing Provider   docosahexaenoic acid/epa (FISH OIL PO) Take 1 Tablet by mouth two (2) times a day. Yes Provider, Historical   aspirin (ASPIR-81 PO) Take 81 mg by mouth daily as needed. Yes Provider, Historical   atorvastatin (LIPITOR) 80 mg tablet Take 1 Tablet by mouth daily. Indications: excessive fat in the blood 7/14/21  Yes Rubi Joyner MD   fluticasone propionate (FLONASE) 50 mcg/actuation nasal spray 2 Sprays by Both Nostrils route daily. 7/14/21  Yes Rubi Joyner MD   pantoprazole (PROTONIX) 40 mg tablet Take 1 Tablet by mouth daily. 7/14/21  Yes Rubi Joyner MD   ALPRAZolam Lia Balo) 0.25 mg tablet Take 1 Tab by mouth two (2) times daily as needed for Anxiety.  Max Daily Amount: 0.5 mg. 5/5/21  Yes Milan Valentine MD   zolpidem (Ambien) 10 mg tablet Take 1 Tab by mouth nightly as needed for Sleep. Max Daily Amount: 10 mg. 5/5/21  Yes Brad Jain MD   venlafaxine-SR [facility] 150 mg capsule Take 1 Cap by mouth daily. 5/5/21  Yes Brad Jain MD   loratadine (CLARITIN) 10 mg tablet Take 1 Tab by mouth daily as needed for Allergies. 4/14/21  Yes Kerry Scales MD   cholecalciferol (VITAMIN D3) 1,000 unit cap Vitamin D3 1,000 unit capsule   Take by oral route. Yes Provider, Historical   ASCORBATE CALCIUM (VITAMIN C PO) Take 1 Tablet by mouth daily. Yes Provider, Historical   BIOTIN PO Take 1 Tablet by mouth daily. Yes Provider, Historical   calcium-cholecalciferol, d3, 600 mg calcium- 200 unit cap Take 2 Tabs by mouth daily. Indications: HYPOCALCEMIA PREVENTION, PREVENTION OF VITAMIN D DEFICIENCY   Yes Provider, Historical   multivitamin (ONE A DAY) tablet Take 1 Tab by mouth daily.    Yes Provider, Historical     Allergies   Allergen Reactions    Codeine Nausea and Vomiting, Anaphylaxis and Rash          Objective:     Physical Exam:   Patient Vitals for the past 24 hrs:   Temp Pulse BP SpO2   08/03/21 1340 97.9 °F (36.6 °C) 77 118/70 100 %       ECG:    EKG Results     Procedure 720 Value Units Date/Time    EKG, 12 LEAD, INITIAL [[id number]] Collected: 08/03/21 1324    Order Status: Completed Updated: 08/04/21 0651     Ventricular Rate 75 BPM      Atrial Rate 75 BPM      P-R Interval 140 ms      QRS Duration 76 ms      Q-T Interval 378 ms      QTC Calculation (Bezet) 422 ms      Calculated P Axis 61 degrees      Calculated R Axis 32 degrees      Calculated T Axis 33 degrees      Diagnosis --     Normal sinus rhythm  Normal ECG  No previous ECGs available  Confirmed by [facility] [place]  MD ()Huma ([id number]) on 8/4/2021 6:51:28 AM            Data Review:   Labs:   Recent Results (from the past 24 hour(s))   EKG, 12 LEAD, INITIAL    Collection Time: 08/03/21  1:24 PM   Result Value Ref Range    Ventricular Rate 75 BPM Atrial Rate 75 BPM    P-R Interval 140 ms    QRS Duration 76 ms    Q-T Interval 378 ms    QTC Calculation (Bezet) 422 ms    Calculated P Axis 61 degrees    Calculated R Axis 32 degrees    Calculated T Axis 33 degrees    Diagnosis       Normal sinus rhythm  Normal ECG  No previous ECGs available  Confirmed by [facility]  MD ()Piotr ([id number]) on 8/4/2021 6:51:28 AM     MSSA/MRSA SC BY PCR, NASAL SWAB    Collection Time: 08/03/21  1:42 PM    Specimen: Nasal swab   Result Value Ref Range    Special Requests: NO SPECIAL REQUESTS      Culture result:        SA target not detected. A MRSA NEGATIVE, SA NEGATIVE test result does not preclude MRSA or SA nasal colonization.      Labs reviewed    Problem List:  )  Patient Active Problem List   Diagnosis Code    Osteoporosis 733.0    Celiac disease K90.0    Hot flashes R23.2    RA (rheumatoid arthritis) ([facility] [address] M06.9    SABIHA (iron deficiency anemia) D50.9    KELLY (obstructive sleep apnea) G47.33    Dysthymic disorder F34.1    Nonallopathic lesion of sacral region M99.9    Lumbago M54.5    Allergic rhinitis J30.9    Osteoarthritis M19.90    Malaise and fatigue R53.81, R53.83    Gastroesophageal reflux disease K21.9    Mixed hyperlipidemia E78.2    Pulmonary sarcoidosis (HCC) D86.0    RLS (restless legs syndrome) G25.81    Other speech disturbances R47.89    History of pulmonary embolism Z86.711    Impaired fasting glucose R73.01    Hypertension I10    IBS (irritable bowel syndrome) K58.9    Tarsal tunnel syndrome G57.50    Pulmonary hypertension, mild (HCC) I27.20    Recurrent major depressive disorder (HCC) F33.9    Generalized anxiety disorder F41.1    Liver cyst K76.89    Obesity, Class I, BMI 30.0-34.9 (see actual BMI) E66.9    Asthma J45.909    Closed fracture of greater tuberosity of right humerus S42.251A    Lesion of liver K76.9    Osteoarthritis of right acromioclavicular joint M19.011    Tear of right rotator cuff M75.101    Primary insomnia F51.01    Osteoporosis M81.0    Environmental allergies Z91.09    Arthritis, hip M16.10    S/P gastric bypass Z98.84    Hyperlipidemia E78.5    Chronic obstructive pulmonary disease (HCC) J44.9    Fibromyalgia M79.7    Medicare annual wellness visit, subsequent Z00.00    History of DVT (deep vein thrombosis) Z86.718    Acute seasonal allergic rhinitis due to pollen J30.1    ACP (advance care planning) Z71.89    BMI 30.0-30.9,adult Z68.30    Dietary counseling Z71.3    Acute right-sided low back pain with sciatica M54.40    History of pulmonary embolus (PE) Z86.711    Fall W19. XXXA    Contusion of left shoulder S40.012A    Cervicalgia M54.2    Radiculopathy affecting upper extremity M54.10    Left shoulder pain M25.512    Left elbow pain M25.522    Sinusitis J32.9    Preoperative clearance Z01.818    Noncompliance with CPAP treatment Z91.14       Total Joint Surgery Pre-Assessment Recommendations: The patient is not compliant with wearing CPAP. Recommend oxygen saturation monitoring during hospitalization and oxygen at 3 liter via [facility].       Signed By: MAGDALENA Duarte    August 4, 2021

## 2021-08-08 PROBLEM — Z12.31 SCREENING MAMMOGRAM, ENCOUNTER FOR: Status: ACTIVE | Noted: 2021-08-08

## 2021-08-12 ENCOUNTER — HOSPITAL ENCOUNTER (OUTPATIENT)
Dept: SURGERY | Age: 66
Discharge: HOME | End: 2021-08-12

## 2021-08-12 NOTE — PERIOP NOTES
Pt states surgery date was changed to 8/19/21 at [facility]. Pt states there have been no changes to medical history or medications. Pt aware of Covid test on 8/16/21. Pt instructed to follow all instructions given at [facility]. Pt voiced an understanding.

## 2021-08-16 NOTE — H&P
Pre Operative History and Physical Exam    Patient ID:  Cruz Cordoba  [id number]  60 y.o.  1955    Today: August 16, 2021       Assessment:   1. Arthritis of the right knee        Plan:    1. Proceed with scheduled Procedure(s) (LRB):  RIGHT KNEE ARTHROPLASTY TOTAL SMITH/NEPHEW (Right)            CC: Right knee pain    HPI:   The patient has right knee arthritis. The patient was evaluated and examined during a consultation prior to this office visit. There have been no changes to the patient's orthopedic condition since the initial consultation. The patient has failed previous conservative treatment for this condition.  The patient will present the day of surgery for Procedure(s) (LRB):  RIGHT KNEE ARTHROPLASTY TOTAL SMITH/NEPHEW (Right)      Past Medical/Surgical History:  Past Medical History:   Diagnosis Date    Allergic rhinitis     Anxiety     Arthritis     osteo    Asthma     as a child~no inhalers     Celiac disease     denies    COVID-19 vaccine series completed 3/30/21, 4/29/21    Moderna     Depression     DVT (deep vein thrombosis) in pregnancy 2015    not in pregnancy     Dysthymic disorder     Esophageal reflux     daily med for control     Exophthalmos     Fibromyalgia     GERD (gastroesophageal reflux disease)     prontonix daily     Hiatal hernia     Hot flashes     Hypercholesterolemia     Hyperlipidemia     on med for control     Hypertension     no meds     IBS (irritable bowel syndrome)     SABIHA (iron deficiency anemia)     HX    Impaired fasting glucose     Liver disease     \"liver cyst\"    Lumbago     Malaise and fatigue     Morbid obesity (HCC)     BMI=32.3    Nonallopathic lesion of sacral region     KELLY (obstructive sleep apnea)     no c-pap; not since weight loss     Osteoarthritis     Osteoporosis     Osteoporosis     Other speech disturbances     PUD (peptic ulcer disease)     denies    Pulmonary embolism ([facility] [address] 2015    took blood thinner for 3 months     Pulmonary hypertension, mild (HCC)     RA (rheumatoid arthritis) (HCC)     RLS (restless legs syndrome)     HX    Sarcoidosis     [facility]     Sciatic neuralgia     Tarsal tunnel syndrome     Urinary incontinence     Wears glasses     Wears partial dentures      Past Surgical History:   Procedure Laterality Date    HX BREAST REDUCTION      HX CARPAL TUNNEL RELEASE Right 1997    2 x    HX CHOLECYSTECTOMY      HX GASTRECTOMY  04/17/2018    gastric sleeve     HX HYSTERECTOMY  1978    HX ORTHOPAEDIC      right knee, right toe, right elbow     HX OTHER SURGICAL  05/14/2018    VENA CAVA FILTER REMOVAL     HX OTHER SURGICAL  04/13/2018    VENA CAVA FILTER INSERTION     HX ROTATOR CUFF REPAIR Right 2018    HX TONSILLECTOMY      as a child        Allergies: Allergies   Allergen Reactions    Codeine Nausea and Vomiting, Anaphylaxis and Rash        Physical Exam:   General: NAD, Alert, Oriented, Appears their stated age     [de-identified]: NC/AT, PERRL    Skin: No rashes, lesions or wounds seen      Psych: normal affect      Heart: Regular Rate, Rhythm     Lungs: unlabored respirations, normal breath sounds     Abdomen: Soft and non-distended     Ortho: Right knee joint line tenderness to palpation with synovial thickening    Neuro: no focal defects, sensation is equal bilaterally     Lymph: no lymphadenopathy     Meds:   No current facility-administered medications for this encounter. Current Outpatient Medications   Medication Sig    docosahexaenoic acid/epa (FISH OIL PO) Take 1 Tablet by mouth two (2) times a day.  aspirin (ASPIR-81 PO) Take 81 mg by mouth daily as needed.  atorvastatin (LIPITOR) 80 mg tablet Take 1 Tablet by mouth daily. Indications: excessive fat in the blood    fluticasone propionate (FLONASE) 50 mcg/actuation nasal spray 2 Sprays by Both Nostrils route daily.  pantoprazole (PROTONIX) 40 mg tablet Take 1 Tablet by mouth daily.     ALPRAZolam Bridger Wheatley) 0.25 mg tablet Take 1 Tab by mouth two (2) times daily as needed for Anxiety. Max Daily Amount: 0.5 mg.    zolpidem (Ambien) 10 mg tablet Take 1 Tab by mouth nightly as needed for Sleep. Max Daily Amount: 10 mg.    venlafaxine-SR (EFFEXOR-XR) 150 mg capsule Take 1 Cap by mouth daily.  loratadine (CLARITIN) 10 mg tablet Take 1 Tab by mouth daily as needed for Allergies.  cholecalciferol (VITAMIN D3) 1,000 unit cap Vitamin D3 1,000 unit capsule   Take by oral route.  ASCORBATE CALCIUM (VITAMIN C PO) Take 1 Tablet by mouth daily.  BIOTIN PO Take 1 Tablet by mouth daily.  calcium-cholecalciferol, d3, 600 mg calcium- 200 unit cap Take 2 Tabs by mouth daily. Indications: HYPOCALCEMIA PREVENTION, PREVENTION OF VITAMIN D DEFICIENCY    multivitamin (ONE A DAY) tablet Take 1 Tab by mouth daily. Labs:  Hospital Outpatient Visit on 08/03/2021   Component Date Value Ref Range Status    WBC 08/03/2021 9.0  4.3 - 11.1 K/uL Final    RBC 08/03/2021 5.03  4.05 - 5.2 M/uL Final    HGB 08/03/2021 14.1  11.7 - 15.4 g/dL Final    HCT 08/03/2021 43.0  35.8 - 46.3 % Final    MCV 08/03/2021 85.5  79.6 - 97.8 FL Final    MCH 08/03/2021 28.0  26.1 - 32.9 PG Final    MCHC 08/03/2021 32.8  31.4 - 35.0 g/dL Final    RDW 08/03/2021 15.0* 11.9 - 14.6 % Final    PLATELET 70/21/8165 268  150 - 450 K/uL Final    MPV 08/03/2021 9.7  9.4 - 12.3 FL Final    ABSOLUTE NRBC 08/03/2021 0.00  0.0 - 0.2 K/uL Final    **Note: Absolute NRBC parameter is now reported with Hemogram**    DF 08/03/2021 AUTOMATED    Final    NEUTROPHILS 08/03/2021 63  43 - 78 % Final    LYMPHOCYTES 08/03/2021 29  13 - 44 % Final    MONOCYTES 08/03/2021 6  4.0 - 12.0 % Final    EOSINOPHILS 08/03/2021 1  0.5 - 7.8 % Final    BASOPHILS 08/03/2021 0  0.0 - 2.0 % Final    IMMATURE GRANULOCYTES 08/03/2021 0  0.0 - 5.0 % Final    ABS. NEUTROPHILS 08/03/2021 5.7  1.7 - 8.2 K/UL Final    ABS.  LYMPHOCYTES 08/03/2021 2.7  0.5 - 4.6 K/UL Final    ABS. MONOCYTES 08/03/2021 0.6  0.1 - 1.3 K/UL Final    ABS. EOSINOPHILS 08/03/2021 0.1  0.0 - 0.8 K/UL Final    ABS. BASOPHILS 08/03/2021 0.0  0.0 - 0.2 K/UL Final    ABS. IMM. GRANS. 08/03/2021 0.0  0.0 - 0.5 K/UL Final    Hemoglobin A1c 08/03/2021 5.1  4.2 - 6.3 % Final    Est. average glucose 08/03/2021 100  mg/dL Final    Comment: (NOTE)  The eAG should be interpreted with patient characteristics in mind   since ethnicity, interindividual differences, red cell lifespan,   variation in rates of glycation, etc. may affect the validity of the   calculation.  Sodium 08/03/2021 139  136 - 145 mmol/L Final    Potassium 08/03/2021 3.8  3.5 - 5.1 mmol/L Final    Chloride 08/03/2021 106  98 - 107 mmol/L Final    CO2 08/03/2021 29  21 - 32 mmol/L Final    Anion gap 08/03/2021 4* 7 - 16 mmol/L Final    Glucose 08/03/2021 70  65 - 100 mg/dL Final    Comment: 47 - 60 mg/dl Consistent with, but not fully diagnostic of hypoglycemia. 101 - 125 mg/dl Impaired fasting glucose/consistent with pre-diabetes mellitus  > 126 mg/dl Fasting glucose consistent with overt diabetes mellitus      BUN 08/03/2021 10  8 - 23 MG/DL Final    Creatinine 08/03/2021 0.77  0.6 - 1.0 MG/DL Final    GFR est AA 08/03/2021 >60  >60 ml/min/1.73m2 Final    GFR est non-AA 08/03/2021 >60  >60 ml/min/1.73m2 Final    Comment: (NOTE)  Estimated GFR is calculated using the Modification of Diet in Renal   Disease (MDRD) Study equation, reported for both  Americans   (GFRAA) and non- Americans (GFRNA), and normalized to 1.73m2   body surface area. The physician must decide which value applies to   the patient. The MDRD study equation should only be used in   individuals age 25 or older. It has not been validated for the   following: pregnant women, patients with serious comorbid conditions,   or on certain medications, or persons with extremes of body size,   muscle mass, or nutritional status.       Calcium 08/03/2021 9.2  8.3 - 10.4 MG/DL Final    Special Requests: 08/03/2021 NO SPECIAL REQUESTS    Final    Culture result: 08/03/2021 SA target not detected. A MRSA NEGATIVE, SA NEGATIVE test result does not preclude MRSA or SA nasal colonization.     Final    Prothrombin time 08/03/2021 13.5  12.5 - 14.7 sec Final    INR 08/03/2021 1.0    Final    Comment: Suggested therapeutic INR range:  Venous thrombosis and embolus  2.0-3.0  Prosthetic heart valve         2.5-3.5  ** Note new reference range and method **      aPTT 08/03/2021 32.5  24.1 - 35.1 SEC Final    Comment: Heparin Therapeutic Range = 74 - 123 seconds  In addition to factor deficiency, monitoring heparin therapy, etc., evaluation of a prolonged aPTT result should include consideration of preanalytic variables such as heparin flush contamination, specimen integrity issues, etc.      Ventricular Rate 08/03/2021 75  BPM Final    Atrial Rate 08/03/2021 75  BPM Final    P-R Interval 08/03/2021 140  ms Final    QRS Duration 08/03/2021 76  ms Final    Q-T Interval 08/03/2021 378  ms Final    QTC Calculation (Bezet) 08/03/2021 422  ms Final    Calculated P Axis 08/03/2021 61  degrees Final    Calculated R Axis 08/03/2021 32  degrees Final    Calculated T Axis 08/03/2021 33  degrees Final    Diagnosis 08/03/2021    Final                    Value:Normal sinus rhythm  Normal ECG  No previous ECGs available  Confirmed by [facility]  MD ()Nargis ([id number]) on 8/4/2021 6:51:28 AM                   Patient Active Problem List   Diagnosis Code    Osteoporosis 733.0    Celiac disease K90.0    Hot flashes R23.2    RA (rheumatoid arthritis) (HCC) M06.9    SABIHA (iron deficiency anemia) D50.9    KELLY (obstructive sleep apnea) G47.33    Dysthymic disorder F34.1    Nonallopathic lesion of sacral region M99.9    Lumbago M54.5    Allergic rhinitis J30.9    Osteoarthritis M19.90    Malaise and fatigue R53.81, R53.83    Gastroesophageal reflux disease K21.9    Mixed hyperlipidemia E78.2    Pulmonary sarcoidosis (HCC) D86.0    RLS (restless legs syndrome) G25.81    Other speech disturbances R47.89    History of pulmonary embolism Z86.711    Impaired fasting glucose R73.01    Hypertension I10    IBS (irritable bowel syndrome) K58.9    Tarsal tunnel syndrome G57.50    Pulmonary hypertension, mild (HCC) I27.20    Recurrent major depressive disorder (HCC) F33.9    Generalized anxiety disorder F41.1    Liver cyst K76.89    Obesity, Class I, BMI 30.0-34.9 (see actual BMI) E66.9    Asthma J45.909    Closed fracture of greater tuberosity of right humerus S42.251A    Lesion of liver K76.9    Osteoarthritis of right acromioclavicular joint M19.011    Tear of right rotator cuff M75.101    Primary insomnia F51.01    Osteoporosis M81.0    Environmental allergies Z91.09    Arthritis, hip M16.10    S/P gastric bypass Z98.84    Hyperlipidemia E78.5    Chronic obstructive pulmonary disease (HCC) J44.9    Fibromyalgia M79.7    Medicare annual wellness visit, subsequent Z00.00    History of DVT (deep vein thrombosis) Z86.718    Acute seasonal allergic rhinitis due to pollen J30.1    ACP (advance care planning) Z71.89    BMI 30.0-30.9,adult Z68.30    Dietary counseling Z71.3    Acute right-sided low back pain with sciatica M54.40    History of pulmonary embolus (PE) Z86.711    Fall W19. XXXA    Contusion of left shoulder S40.012A    Cervicalgia M54.2    Radiculopathy affecting upper extremity M54.10    Left shoulder pain M25.512    Left elbow pain M25.522    Sinusitis J32.9    Preoperative clearance Z01.818    Noncompliance with CPAP treatment Z91.14    Screening mammogram, encounter for Z12.31         Signed By: Stephanie Redding MD  August 16, 2021

## 2021-08-18 ENCOUNTER — ANESTHESIA EVENT (OUTPATIENT)
Dept: SURGERY | Age: 66
End: 2021-08-18
Payer: MEDICARE

## 2021-08-19 ENCOUNTER — ANESTHESIA (OUTPATIENT)
Dept: SURGERY | Age: 66
End: 2021-08-19
Payer: MEDICARE

## 2021-08-19 ENCOUNTER — HOME HEALTH ADMISSION (OUTPATIENT)
Dept: HOME HEALTH SERVICES | Facility: HOME HEALTH | Age: 66
End: 2021-08-19
Payer: MEDICARE

## 2021-08-19 ENCOUNTER — HOSPITAL ENCOUNTER (OUTPATIENT)
Age: 66
Discharge: HOME HEALTH | End: 2021-08-21
Attending: ORTHOPAEDIC SURGERY | Admitting: ORTHOPAEDIC SURGERY
Payer: MEDICARE

## 2021-08-19 DIAGNOSIS — M17.11 ARTHRITIS OF RIGHT KNEE: Primary | ICD-10-CM

## 2021-08-19 LAB — HGB BLD-MCNC: 11.9 G/DL (ref 11.7–15.4)

## 2021-08-19 PROCEDURE — 77030002933 HC SUT MCRYL J&J -A: Performed by: ORTHOPAEDIC SURGERY

## 2021-08-19 PROCEDURE — 77030006790 HC BLD SAW OSC ZIMM -B: Performed by: ORTHOPAEDIC SURGERY

## 2021-08-19 PROCEDURE — 85018 HEMOGLOBIN: CPT

## 2021-08-19 PROCEDURE — 74011250636 HC RX REV CODE- 250/636: Performed by: ORTHOPAEDIC SURGERY

## 2021-08-19 PROCEDURE — 77030020044 HC CLD THERAPY UNIT -B

## 2021-08-19 PROCEDURE — 97161 PT EVAL LOW COMPLEX 20 MIN: CPT

## 2021-08-19 PROCEDURE — 97530 THERAPEUTIC ACTIVITIES: CPT

## 2021-08-19 PROCEDURE — C1776 JOINT DEVICE (IMPLANTABLE): HCPCS | Performed by: ORTHOPAEDIC SURGERY

## 2021-08-19 PROCEDURE — 77030019557 HC ELECTRD VES SEAL MEDT -F: Performed by: ORTHOPAEDIC SURGERY

## 2021-08-19 PROCEDURE — 74011000250 HC RX REV CODE- 250: Performed by: ORTHOPAEDIC SURGERY

## 2021-08-19 PROCEDURE — 36415 COLL VENOUS BLD VENIPUNCTURE: CPT

## 2021-08-19 PROCEDURE — 77030008462 HC STPLR SKN PROX J&J -A: Performed by: ORTHOPAEDIC SURGERY

## 2021-08-19 PROCEDURE — 27447 TOTAL KNEE ARTHROPLASTY: CPT | Performed by: ORTHOPAEDIC SURGERY

## 2021-08-19 PROCEDURE — 77030018547 HC SUT ETHBND1 J&J -B: Performed by: ORTHOPAEDIC SURGERY

## 2021-08-19 PROCEDURE — 77030007880 HC KT SPN EPDRL BBMI -B: Performed by: ANESTHESIOLOGY

## 2021-08-19 PROCEDURE — 2709999900 HC NON-CHARGEABLE SUPPLY: Performed by: ORTHOPAEDIC SURGERY

## 2021-08-19 PROCEDURE — 97535 SELF CARE MNGMENT TRAINING: CPT

## 2021-08-19 PROCEDURE — 76060000036 HC ANESTHESIA 2.5 TO 3 HR: Performed by: ORTHOPAEDIC SURGERY

## 2021-08-19 PROCEDURE — 77030012551: Performed by: ORTHOPAEDIC SURGERY

## 2021-08-19 PROCEDURE — 76010010054 HC POST OP PAIN BLOCK: Performed by: ORTHOPAEDIC SURGERY

## 2021-08-19 PROCEDURE — 77030003602 HC NDL NRV BLK BBMI -B: Performed by: ANESTHESIOLOGY

## 2021-08-19 PROCEDURE — 77030040830 HC CATH URETH FOL MDII -A: Performed by: ORTHOPAEDIC SURGERY

## 2021-08-19 PROCEDURE — 77030012935 HC DRSG AQUACEL BMS -B: Performed by: ORTHOPAEDIC SURGERY

## 2021-08-19 PROCEDURE — 74011250637 HC RX REV CODE- 250/637: Performed by: ORTHOPAEDIC SURGERY

## 2021-08-19 PROCEDURE — 77030020269 HC MISC IMPL: Performed by: ORTHOPAEDIC SURGERY

## 2021-08-19 PROCEDURE — 65270000029 HC RM PRIVATE

## 2021-08-19 PROCEDURE — 77030003665 HC NDL SPN BBMI -A: Performed by: ANESTHESIOLOGY

## 2021-08-19 PROCEDURE — 76010000172 HC OR TIME 2.5 TO 3 HR INTENSV-TIER 1: Performed by: ORTHOPAEDIC SURGERY

## 2021-08-19 PROCEDURE — 94762 N-INVAS EAR/PLS OXIMTRY CONT: CPT

## 2021-08-19 PROCEDURE — 77030011208: Performed by: ORTHOPAEDIC SURGERY

## 2021-08-19 PROCEDURE — 74011250636 HC RX REV CODE- 250/636: Performed by: ANESTHESIOLOGY

## 2021-08-19 PROCEDURE — 74011000250 HC RX REV CODE- 250: Performed by: ANESTHESIOLOGY

## 2021-08-19 PROCEDURE — 76942 ECHO GUIDE FOR BIOPSY: CPT | Performed by: ORTHOPAEDIC SURGERY

## 2021-08-19 PROCEDURE — 77030020365 HC SOL INJ SOD CL 0.9% 50ML: Performed by: ORTHOPAEDIC SURGERY

## 2021-08-19 PROCEDURE — 97165 OT EVAL LOW COMPLEX 30 MIN: CPT

## 2021-08-19 PROCEDURE — 77030040922 HC BLNKT HYPOTHRM STRY -A: Performed by: ANESTHESIOLOGY

## 2021-08-19 PROCEDURE — 76210000006 HC OR PH I REC 0.5 TO 1 HR: Performed by: ORTHOPAEDIC SURGERY

## 2021-08-19 PROCEDURE — 94760 N-INVAS EAR/PLS OXIMETRY 1: CPT

## 2021-08-19 DEVICE — KNEE K1 TOT HEMI STD CEM IMPL CAPPED K1 SN: Type: IMPLANTABLE DEVICE | Status: FUNCTIONAL

## 2021-08-19 DEVICE — Z  INACTIVE USE 2750024 CEMENT BONE 40GM W/ GENTMYCN HI VISC PALACOS R+G: Type: IMPLANTABLE DEVICE | Site: KNEE | Status: FUNCTIONAL

## 2021-08-19 DEVICE — JOURNEY II BCS XLPE ARTICULAR                                    INSERT SIZE 3-4 RIGHT 11MM
Type: IMPLANTABLE DEVICE | Site: KNEE | Status: FUNCTIONAL
Brand: JOURNEY

## 2021-08-19 DEVICE — JOURNEY BCS PATELLA RESURFACING                                    ROUND 29 MM STANDARD
Type: IMPLANTABLE DEVICE | Site: KNEE | Status: FUNCTIONAL
Brand: JOURNEY

## 2021-08-19 DEVICE — JOURNEY II BCS FEMORAL OXINIUM                                    RIGHT SIZE 4
Type: IMPLANTABLE DEVICE | Site: KNEE | Status: FUNCTIONAL
Brand: JOURNEY

## 2021-08-19 DEVICE — JOURNEY TIBIAL BASEPLATE NONPOROUS                                    RIGHT SIZE 4
Type: IMPLANTABLE DEVICE | Site: KNEE | Status: FUNCTIONAL
Brand: JOURNEY

## 2021-08-19 RX ORDER — CETIRIZINE HCL 10 MG
10 TABLET ORAL DAILY
Status: DISCONTINUED | OUTPATIENT
Start: 2021-08-20 | End: 2021-08-21 | Stop reason: HOSPADM

## 2021-08-19 RX ORDER — ACETAMINOPHEN 650 MG/1
650 SUPPOSITORY RECTAL ONCE
Status: DISCONTINUED | OUTPATIENT
Start: 2021-08-19 | End: 2021-08-19 | Stop reason: SDUPTHER

## 2021-08-19 RX ORDER — DIPHENHYDRAMINE HCL 25 MG
25 CAPSULE ORAL
Status: DISCONTINUED | OUTPATIENT
Start: 2021-08-19 | End: 2021-08-21 | Stop reason: HOSPADM

## 2021-08-19 RX ORDER — HYDROMORPHONE HYDROCHLORIDE 1 MG/ML
1 INJECTION, SOLUTION INTRAMUSCULAR; INTRAVENOUS; SUBCUTANEOUS
Status: DISCONTINUED | OUTPATIENT
Start: 2021-08-19 | End: 2021-08-21 | Stop reason: HOSPADM

## 2021-08-19 RX ORDER — CEFAZOLIN SODIUM/WATER 2 G/20 ML
2 SYRINGE (ML) INTRAVENOUS EVERY 8 HOURS
Status: COMPLETED | OUTPATIENT
Start: 2021-08-19 | End: 2021-08-20

## 2021-08-19 RX ORDER — CEFAZOLIN SODIUM/WATER 2 G/20 ML
2 SYRINGE (ML) INTRAVENOUS
Status: COMPLETED | OUTPATIENT
Start: 2021-08-19 | End: 2021-08-19

## 2021-08-19 RX ORDER — MIDAZOLAM HYDROCHLORIDE 1 MG/ML
2 INJECTION, SOLUTION INTRAMUSCULAR; INTRAVENOUS
Status: COMPLETED | OUTPATIENT
Start: 2021-08-19 | End: 2021-08-19

## 2021-08-19 RX ORDER — MELATONIN
1000
Status: DISCONTINUED | OUTPATIENT
Start: 2021-08-20 | End: 2021-08-21 | Stop reason: HOSPADM

## 2021-08-19 RX ORDER — ATORVASTATIN CALCIUM 40 MG/1
80 TABLET, FILM COATED ORAL DAILY
Status: DISCONTINUED | OUTPATIENT
Start: 2021-08-20 | End: 2021-08-21 | Stop reason: HOSPADM

## 2021-08-19 RX ORDER — VANCOMYCIN HYDROCHLORIDE 1 G/20ML
INJECTION, POWDER, LYOPHILIZED, FOR SOLUTION INTRAVENOUS AS NEEDED
Status: DISCONTINUED | OUTPATIENT
Start: 2021-08-19 | End: 2021-08-19 | Stop reason: HOSPADM

## 2021-08-19 RX ORDER — LIDOCAINE HYDROCHLORIDE 20 MG/ML
INJECTION, SOLUTION EPIDURAL; INFILTRATION; INTRACAUDAL; PERINEURAL AS NEEDED
Status: DISCONTINUED | OUTPATIENT
Start: 2021-08-19 | End: 2021-08-19 | Stop reason: HOSPADM

## 2021-08-19 RX ORDER — ROPIVACAINE HYDROCHLORIDE 2 MG/ML
INJECTION, SOLUTION EPIDURAL; INFILTRATION; PERINEURAL AS NEEDED
Status: DISCONTINUED | OUTPATIENT
Start: 2021-08-19 | End: 2021-08-19 | Stop reason: HOSPADM

## 2021-08-19 RX ORDER — ROPIVACAINE HYDROCHLORIDE 2 MG/ML
INJECTION, SOLUTION EPIDURAL; INFILTRATION; PERINEURAL
Status: COMPLETED | OUTPATIENT
Start: 2021-08-19 | End: 2021-08-19

## 2021-08-19 RX ORDER — ACETAMINOPHEN 500 MG
1000 TABLET ORAL
Status: ON HOLD | COMMUNITY
End: 2021-08-19

## 2021-08-19 RX ORDER — HYDROMORPHONE HYDROCHLORIDE 2 MG/ML
0.5 INJECTION, SOLUTION INTRAMUSCULAR; INTRAVENOUS; SUBCUTANEOUS
Status: DISCONTINUED | OUTPATIENT
Start: 2021-08-19 | End: 2021-08-19 | Stop reason: HOSPADM

## 2021-08-19 RX ORDER — VENLAFAXINE HYDROCHLORIDE 150 MG/1
150 CAPSULE, EXTENDED RELEASE ORAL DAILY
Status: DISCONTINUED | OUTPATIENT
Start: 2021-08-20 | End: 2021-08-21 | Stop reason: HOSPADM

## 2021-08-19 RX ORDER — BUPIVACAINE HYDROCHLORIDE 7.5 MG/ML
INJECTION, SOLUTION EPIDURAL; RETROBULBAR
Status: COMPLETED | OUTPATIENT
Start: 2021-08-19 | End: 2021-08-19

## 2021-08-19 RX ORDER — DIPHENHYDRAMINE HYDROCHLORIDE 50 MG/ML
12.5 INJECTION, SOLUTION INTRAMUSCULAR; INTRAVENOUS
Status: COMPLETED | OUTPATIENT
Start: 2021-08-19 | End: 2021-08-19

## 2021-08-19 RX ORDER — ZOLPIDEM TARTRATE 5 MG/1
10 TABLET ORAL
Status: DISCONTINUED | OUTPATIENT
Start: 2021-08-19 | End: 2021-08-21 | Stop reason: HOSPADM

## 2021-08-19 RX ORDER — FERROUS SULFATE, DRIED 160(50) MG
1 TABLET, EXTENDED RELEASE ORAL
Status: DISCONTINUED | OUTPATIENT
Start: 2021-08-19 | End: 2021-08-21 | Stop reason: HOSPADM

## 2021-08-19 RX ORDER — SODIUM CHLORIDE, SODIUM LACTATE, POTASSIUM CHLORIDE, CALCIUM CHLORIDE 600; 310; 30; 20 MG/100ML; MG/100ML; MG/100ML; MG/100ML
100 INJECTION, SOLUTION INTRAVENOUS CONTINUOUS
Status: DISCONTINUED | OUTPATIENT
Start: 2021-08-19 | End: 2021-08-19 | Stop reason: HOSPADM

## 2021-08-19 RX ORDER — SODIUM CHLORIDE, SODIUM LACTATE, POTASSIUM CHLORIDE, CALCIUM CHLORIDE 600; 310; 30; 20 MG/100ML; MG/100ML; MG/100ML; MG/100ML
INJECTION, SOLUTION INTRAVENOUS
Status: DISCONTINUED | OUTPATIENT
Start: 2021-08-19 | End: 2021-08-19 | Stop reason: HOSPADM

## 2021-08-19 RX ORDER — PANTOPRAZOLE SODIUM 40 MG/1
40 TABLET, DELAYED RELEASE ORAL DAILY
Status: DISCONTINUED | OUTPATIENT
Start: 2021-08-20 | End: 2021-08-21 | Stop reason: HOSPADM

## 2021-08-19 RX ORDER — OXYCODONE HYDROCHLORIDE 5 MG/1
10 TABLET ORAL
Status: DISCONTINUED | OUTPATIENT
Start: 2021-08-19 | End: 2021-08-21 | Stop reason: HOSPADM

## 2021-08-19 RX ORDER — SODIUM CHLORIDE 9 MG/ML
100 INJECTION, SOLUTION INTRAVENOUS CONTINUOUS
Status: DISPENSED | OUTPATIENT
Start: 2021-08-19 | End: 2021-08-21

## 2021-08-19 RX ORDER — ONDANSETRON 2 MG/ML
INJECTION INTRAMUSCULAR; INTRAVENOUS AS NEEDED
Status: DISCONTINUED | OUTPATIENT
Start: 2021-08-19 | End: 2021-08-19 | Stop reason: HOSPADM

## 2021-08-19 RX ORDER — DIPHENHYDRAMINE HCL 25 MG
25 CAPSULE ORAL
Status: DISCONTINUED | OUTPATIENT
Start: 2021-08-19 | End: 2021-08-19 | Stop reason: SDUPTHER

## 2021-08-19 RX ORDER — DEXAMETHASONE SODIUM PHOSPHATE 4 MG/ML
INJECTION, SOLUTION INTRA-ARTICULAR; INTRALESIONAL; INTRAMUSCULAR; INTRAVENOUS; SOFT TISSUE AS NEEDED
Status: DISCONTINUED | OUTPATIENT
Start: 2021-08-19 | End: 2021-08-19 | Stop reason: HOSPADM

## 2021-08-19 RX ORDER — MIDAZOLAM HYDROCHLORIDE 1 MG/ML
2 INJECTION, SOLUTION INTRAMUSCULAR; INTRAVENOUS ONCE
Status: DISCONTINUED | OUTPATIENT
Start: 2021-08-19 | End: 2021-08-19 | Stop reason: HOSPADM

## 2021-08-19 RX ORDER — EPHEDRINE SULFATE/0.9% NACL/PF 50 MG/5 ML
SYRINGE (ML) INTRAVENOUS AS NEEDED
Status: DISCONTINUED | OUTPATIENT
Start: 2021-08-19 | End: 2021-08-19 | Stop reason: HOSPADM

## 2021-08-19 RX ORDER — SODIUM CHLORIDE 0.9 % (FLUSH) 0.9 %
5-40 SYRINGE (ML) INJECTION AS NEEDED
Status: DISCONTINUED | OUTPATIENT
Start: 2021-08-19 | End: 2021-08-21 | Stop reason: HOSPADM

## 2021-08-19 RX ORDER — DEXAMETHASONE SODIUM PHOSPHATE 4 MG/ML
INJECTION, SOLUTION INTRA-ARTICULAR; INTRALESIONAL; INTRAMUSCULAR; INTRAVENOUS; SOFT TISSUE
Status: COMPLETED | OUTPATIENT
Start: 2021-08-19 | End: 2021-08-19

## 2021-08-19 RX ORDER — ACETAMINOPHEN 325 MG/1
975 TABLET ORAL ONCE
Status: DISCONTINUED | OUTPATIENT
Start: 2021-08-19 | End: 2021-08-19 | Stop reason: SDUPTHER

## 2021-08-19 RX ORDER — AMOXICILLIN 250 MG
2 CAPSULE ORAL DAILY
Status: DISCONTINUED | OUTPATIENT
Start: 2021-08-20 | End: 2021-08-21 | Stop reason: HOSPADM

## 2021-08-19 RX ORDER — MIDAZOLAM HYDROCHLORIDE 1 MG/ML
INJECTION, SOLUTION INTRAMUSCULAR; INTRAVENOUS AS NEEDED
Status: DISCONTINUED | OUTPATIENT
Start: 2021-08-19 | End: 2021-08-19 | Stop reason: HOSPADM

## 2021-08-19 RX ORDER — ACETAMINOPHEN 500 MG
1000 TABLET ORAL ONCE
Status: DISCONTINUED | OUTPATIENT
Start: 2021-08-19 | End: 2021-08-19 | Stop reason: HOSPADM

## 2021-08-19 RX ORDER — LIDOCAINE HYDROCHLORIDE 10 MG/ML
0.1 INJECTION INFILTRATION; PERINEURAL AS NEEDED
Status: DISCONTINUED | OUTPATIENT
Start: 2021-08-19 | End: 2021-08-19 | Stop reason: HOSPADM

## 2021-08-19 RX ORDER — TRANEXAMIC ACID 100 MG/ML
INJECTION, SOLUTION INTRAVENOUS AS NEEDED
Status: DISCONTINUED | OUTPATIENT
Start: 2021-08-19 | End: 2021-08-19 | Stop reason: HOSPADM

## 2021-08-19 RX ORDER — FENTANYL CITRATE 50 UG/ML
100 INJECTION, SOLUTION INTRAMUSCULAR; INTRAVENOUS ONCE
Status: COMPLETED | OUTPATIENT
Start: 2021-08-19 | End: 2021-08-19

## 2021-08-19 RX ORDER — FLUTICASONE PROPIONATE 50 MCG
2 SPRAY, SUSPENSION (ML) NASAL DAILY
Status: DISCONTINUED | OUTPATIENT
Start: 2021-08-20 | End: 2021-08-21 | Stop reason: HOSPADM

## 2021-08-19 RX ORDER — SODIUM CHLORIDE 0.9 % (FLUSH) 0.9 %
5-40 SYRINGE (ML) INJECTION EVERY 8 HOURS
Status: DISCONTINUED | OUTPATIENT
Start: 2021-08-19 | End: 2021-08-21 | Stop reason: HOSPADM

## 2021-08-19 RX ORDER — ASPIRIN 81 MG/1
81 TABLET ORAL EVERY 12 HOURS
Status: DISCONTINUED | OUTPATIENT
Start: 2021-08-19 | End: 2021-08-20

## 2021-08-19 RX ORDER — ALPRAZOLAM 0.5 MG/1
0.25 TABLET ORAL
Status: DISCONTINUED | OUTPATIENT
Start: 2021-08-19 | End: 2021-08-21 | Stop reason: HOSPADM

## 2021-08-19 RX ORDER — ACETAMINOPHEN 500 MG
1000 TABLET ORAL EVERY 6 HOURS
Status: DISCONTINUED | OUTPATIENT
Start: 2021-08-19 | End: 2021-08-21 | Stop reason: HOSPADM

## 2021-08-19 RX ORDER — OXYCODONE HYDROCHLORIDE 5 MG/1
5 TABLET ORAL
Status: DISCONTINUED | OUTPATIENT
Start: 2021-08-19 | End: 2021-08-21 | Stop reason: HOSPADM

## 2021-08-19 RX ORDER — DEXAMETHASONE SODIUM PHOSPHATE 100 MG/10ML
10 INJECTION INTRAMUSCULAR; INTRAVENOUS ONCE
Status: ACTIVE | OUTPATIENT
Start: 2021-08-20 | End: 2021-08-21

## 2021-08-19 RX ORDER — KETOROLAC TROMETHAMINE 30 MG/ML
INJECTION, SOLUTION INTRAMUSCULAR; INTRAVENOUS AS NEEDED
Status: DISCONTINUED | OUTPATIENT
Start: 2021-08-19 | End: 2021-08-19 | Stop reason: HOSPADM

## 2021-08-19 RX ORDER — NALOXONE HYDROCHLORIDE 0.4 MG/ML
.2-.4 INJECTION, SOLUTION INTRAMUSCULAR; INTRAVENOUS; SUBCUTANEOUS
Status: DISCONTINUED | OUTPATIENT
Start: 2021-08-19 | End: 2021-08-21 | Stop reason: HOSPADM

## 2021-08-19 RX ORDER — PROPOFOL 10 MG/ML
INJECTION, EMULSION INTRAVENOUS
Status: DISCONTINUED | OUTPATIENT
Start: 2021-08-19 | End: 2021-08-19 | Stop reason: HOSPADM

## 2021-08-19 RX ADMIN — Medication 10 MG: at 09:25

## 2021-08-19 RX ADMIN — MIDAZOLAM 1 MG: 1 INJECTION INTRAMUSCULAR; INTRAVENOUS at 08:52

## 2021-08-19 RX ADMIN — HYDROMORPHONE HYDROCHLORIDE 1 MG: 1 INJECTION, SOLUTION INTRAMUSCULAR; INTRAVENOUS; SUBCUTANEOUS at 14:47

## 2021-08-19 RX ADMIN — PROPOFOL 100 MCG/KG/MIN: 10 INJECTION, EMULSION INTRAVENOUS at 09:08

## 2021-08-19 RX ADMIN — PHENYLEPHRINE HYDROCHLORIDE 50 MCG: 10 INJECTION INTRAVENOUS at 09:51

## 2021-08-19 RX ADMIN — TRANEXAMIC ACID 1 G: 100 INJECTION, SOLUTION INTRAVENOUS at 10:35

## 2021-08-19 RX ADMIN — SODIUM CHLORIDE, SODIUM LACTATE, POTASSIUM CHLORIDE, AND CALCIUM CHLORIDE: 600; 310; 30; 20 INJECTION, SOLUTION INTRAVENOUS at 08:49

## 2021-08-19 RX ADMIN — PHENYLEPHRINE HYDROCHLORIDE 100 MCG: 10 INJECTION INTRAVENOUS at 10:24

## 2021-08-19 RX ADMIN — PROPOFOL 75 MCG/KG/MIN: 10 INJECTION, EMULSION INTRAVENOUS at 09:06

## 2021-08-19 RX ADMIN — MIDAZOLAM 2 MG: 1 INJECTION INTRAMUSCULAR; INTRAVENOUS at 09:12

## 2021-08-19 RX ADMIN — ALPRAZOLAM 0.25 MG: 0.5 TABLET ORAL at 21:16

## 2021-08-19 RX ADMIN — ROPIVACAINE HYDROCHLORIDE 40 MG: 2 INJECTION, SOLUTION EPIDURAL; INFILTRATION at 08:41

## 2021-08-19 RX ADMIN — OXYCODONE 10 MG: 5 TABLET ORAL at 17:11

## 2021-08-19 RX ADMIN — MIDAZOLAM 1 MG: 1 INJECTION INTRAMUSCULAR; INTRAVENOUS at 09:01

## 2021-08-19 RX ADMIN — Medication 1 TABLET: at 17:08

## 2021-08-19 RX ADMIN — OXYCODONE 10 MG: 5 TABLET ORAL at 21:35

## 2021-08-19 RX ADMIN — SODIUM CHLORIDE, SODIUM LACTATE, POTASSIUM CHLORIDE, AND CALCIUM CHLORIDE 100 ML/HR: 600; 310; 30; 20 INJECTION, SOLUTION INTRAVENOUS at 07:48

## 2021-08-19 RX ADMIN — ACETAMINOPHEN 1000 MG: 500 TABLET, FILM COATED ORAL at 17:08

## 2021-08-19 RX ADMIN — SODIUM CHLORIDE, SODIUM LACTATE, POTASSIUM CHLORIDE, AND CALCIUM CHLORIDE: 600; 310; 30; 20 INJECTION, SOLUTION INTRAVENOUS at 09:35

## 2021-08-19 RX ADMIN — BUPIVACAINE HYDROCHLORIDE 15 MG: 7.5 INJECTION, SOLUTION EPIDURAL; RETROBULBAR at 08:52

## 2021-08-19 RX ADMIN — PHENYLEPHRINE HYDROCHLORIDE 50 MCG: 10 INJECTION INTRAVENOUS at 09:59

## 2021-08-19 RX ADMIN — DIPHENHYDRAMINE HYDROCHLORIDE 25 MG: 25 CAPSULE ORAL at 18:27

## 2021-08-19 RX ADMIN — DIPHENHYDRAMINE HYDROCHLORIDE 12.5 MG: 50 INJECTION, SOLUTION INTRAMUSCULAR; INTRAVENOUS at 11:39

## 2021-08-19 RX ADMIN — PHENYLEPHRINE HYDROCHLORIDE 100 MCG: 10 INJECTION INTRAVENOUS at 10:35

## 2021-08-19 RX ADMIN — Medication 81 MG: at 21:36

## 2021-08-19 RX ADMIN — DEXAMETHASONE SODIUM PHOSPHATE 10 MG: 4 INJECTION, SOLUTION INTRA-ARTICULAR; INTRALESIONAL; INTRAMUSCULAR; INTRAVENOUS; SOFT TISSUE at 09:06

## 2021-08-19 RX ADMIN — Medication 1 AMPULE: at 21:36

## 2021-08-19 RX ADMIN — LIDOCAINE HYDROCHLORIDE 20 MG: 20 INJECTION, SOLUTION EPIDURAL; INFILTRATION; INTRACAUDAL; PERINEURAL at 09:06

## 2021-08-19 RX ADMIN — CEFAZOLIN SODIUM 2 G: 100 INJECTION, POWDER, LYOPHILIZED, FOR SOLUTION INTRAVENOUS at 17:09

## 2021-08-19 RX ADMIN — MIDAZOLAM 2 MG: 1 INJECTION INTRAMUSCULAR; INTRAVENOUS at 08:37

## 2021-08-19 RX ADMIN — DEXAMETHASONE SODIUM PHOSPHATE 4 MG: 4 INJECTION, SOLUTION INTRAMUSCULAR; INTRAVENOUS at 08:41

## 2021-08-19 RX ADMIN — ONDANSETRON 4 MG: 2 INJECTION INTRAMUSCULAR; INTRAVENOUS at 09:06

## 2021-08-19 RX ADMIN — FENTANYL CITRATE 100 MCG: 50 INJECTION INTRAMUSCULAR; INTRAVENOUS at 08:37

## 2021-08-19 RX ADMIN — CEFAZOLIN 2 G: 1 INJECTION, POWDER, FOR SOLUTION INTRAVENOUS at 08:50

## 2021-08-19 RX ADMIN — PHENYLEPHRINE HYDROCHLORIDE 50 MCG: 10 INJECTION INTRAVENOUS at 10:07

## 2021-08-19 RX ADMIN — Medication 10 ML: at 22:00

## 2021-08-19 RX ADMIN — HYDROMORPHONE HYDROCHLORIDE 0.5 MG: 2 INJECTION INTRAMUSCULAR; INTRAVENOUS; SUBCUTANEOUS at 11:38

## 2021-08-19 RX ADMIN — TRANEXAMIC ACID 1 G: 100 INJECTION, SOLUTION INTRAVENOUS at 09:10

## 2021-08-19 NOTE — ANESTHESIA POSTPROCEDURE EVALUATION
Procedure(s):  RIGHT KNEE ARTHROPLASTY TOTAL SMITH/NEPHEW.    spinal    Anesthesia Post Evaluation      Multimodal analgesia: multimodal analgesia used between 6 hours prior to anesthesia start to PACU discharge  Patient location during evaluation: bedside  Patient participation: complete - patient participated  Level of consciousness: responsive to verbal stimuli  Pain management: adequate  Airway patency: patent  Anesthetic complications: no  Cardiovascular status: hemodynamically stable  Respiratory status: spontaneous ventilation  Hydration status: stable    Final Post Anesthesia Temperature Assessment:  Normothermia (36.0-37.5 degrees C)      INITIAL Post-op Vital signs:   Vitals Value Taken Time   /59 08/19/21 1130   Temp 37.3 °C (99.1 °F) 08/19/21 1125   Pulse 92 08/19/21 1132   Resp 18 08/19/21 1130   SpO2 98 % 08/19/21 1132   Vitals shown include unvalidated device data.

## 2021-08-19 NOTE — PROGRESS NOTES
Problem: Mobility Impaired (Adult and Pediatric)  Goal: *Acute Goals and Plan of Care (Insert Text)  Note: GOALS (1-4 days):  (1.)Ms. Pilar Lambert will move from supine to sit and sit to supine  in bed with SUPERVISION. (2.)Ms. Pilar Lambert will transfer from bed to chair and chair to bed with SUPERVISION using the least restrictive device. (3.)Ms. Pilar Lambert will ambulate with SUPERVISION for 250 feet with the least restrictive device. (4.)Ms. Pilar Lambert will ambulate up/down 4 steps with bilateral  railing with CONTACT GUARD ASSIST with no device. (5.)Ms. Pilar Lambert will increase right knee ROM to 5°-90°.  ________________________________________________________________________________________________      PHYSICAL THERAPY JOINT CAMP TKA: Initial Assessment and PM 8/19/2021  INPATIENT: Hospital Day: 1  Payor: [place] / Plan: [employer] / Product Type: [employer] Care Medicare /      NAME/AGE/GENDER: Baljeet Sarmiento is a 76 y.o. female   PRIMARY DIAGNOSIS:  Right knee pain, unspecified chronicity [M25.561]  Arthritis of right knee [M17.11]   Procedure(s) and Anesthesia Type:     * RIGHT KNEE ARTHROPLASTY TOTAL SMITH/NEPHEW - Spinal (Right)  ICD-10: Treatment Diagnosis:    Pain in Right Knee (M25.561)  Stiffness of Right Knee, Not elsewhere classified (M25.661)  Other abnormalities of gait and mobility (R26.89)      ASSESSMENT:     Ms. Pilar Lambert presents S/P R TKA and is experiencing a decline in her premorbid level of function. She would benefit from further PT while here to address these deficits: decreased R LE strength and ROM, standing balance, functional mobility and TKA awareness. She plans on going home at [place] with family support and  PT. This pm, she is still numb from her spinal. She is asking to use the restroom. She is limited with her distance due to diminished proprioception and safety. She returns to recliner and performs her R TKA exs. We review these.  She also performs toilet transfer with min assist.    This section established at most recent assessment   PROBLEM LIST (Impairments causing functional limitations):  Decreased Strength  Decreased ADL/Functional Activities  Decreased Transfer Abilities  Decreased Ambulation Ability/Technique  Decreased Balance  Decreased Activity Tolerance  Increased Fatigue  Decreased Flexibility/Joint Mobility  Decreased Knowledge of Precautions  Decreased Robertson with Home Exercise Program   INTERVENTIONS PLANNED: (Benefits and precautions of physical therapy have been discussed with the patient.)  Bed Mobility  Cold  Gait Training  Home Exercise Program (HEP)  Range of Motion (ROM)  Therapeutic Activites  Therapeutic Exercise/Strengthening  Transfer Training  TKA education     TREATMENT PLAN: Frequency/Duration: Follow patient BID for duration of hospital stay to address above goals. Rehabilitation Potential For Stated Goals: Good     RECOMMENDED REHABILITATION/EQUIPMENT: (at time of discharge pending progress): Continue Skilled Therapy and Home Health: Physical Therapy.               HISTORY:   History of Present Injury/Illness (Reason for Referral):  S/P R TKA  Past Medical History/Comorbidities:   Ms. Joana Cavazos  has a past medical history of Allergic rhinitis, Anxiety, Arthritis, Asthma, Celiac disease, COVID-19 vaccine series completed (3/30/21, 4/29/21), Depression, DVT (deep vein thrombosis) in pregnancy (2015), Dysthymic disorder, Esophageal reflux, Exophthalmos, Fibromyalgia, GERD (gastroesophageal reflux disease), Hiatal hernia, Hot flashes, Hypercholesterolemia, Hyperlipidemia, Hypertension, IBS (irritable bowel syndrome), SABIHA (iron deficiency anemia), Impaired fasting glucose, Liver disease, Lumbago, Malaise and fatigue, Morbid obesity ([address] Nonallopathic lesion of sacral region, KELLY (obstructive sleep apnea), Osteoarthritis, Osteoporosis, Osteoporosis, Other speech disturbances, PUD (peptic ulcer disease), Pulmonary embolism ([facility] (2015), Pulmonary hypertension, mild ([facility] [address] RA (rheumatoid arthritis) ([facility] [address] RLS (restless legs syndrome), Sarcoidosis, Sciatic neuralgia, Tarsal tunnel syndrome, Urinary incontinence, Wears glasses, and Wears partial dentures. Ms. Sarah Campbell  has a past surgical history that includes hx hysterectomy (1978); hx orthopaedic; hx tonsillectomy; hx carpal tunnel release (Right, 1997); hx cholecystectomy; hx gastrectomy (04/17/2018); hx rotator cuff repair (Right, 2018); hx breast reduction; hx other surgical (05/14/2018); and hx other surgical (04/13/2018). Social History/Living Environment:   Home Environment: Private residence  # Steps to Enter: 1  One/Two Story Residence: Two story, live on 1st floor  # of Interior Steps: 17  Living Alone: No  Support Systems: Child(josee); Friends \ neighbors  Patient Expects to be Discharged to[de-identified] [employer]  Current DME Used/Available at Home: Cane, straight  Tub or Shower Type: Tub/Shower combination    Prior Level of Function/Work/Activity:  Functionally I   Number of Personal Factors/Comorbidities that affect the Plan of Care: 3+: HIGH COMPLEXITY   EXAMINATION:   Most Recent Physical Functioning:      Gross Assessment  AROM: Within functional limits (L LE)  Strength: Generally decreased, functional (L LE 3+)  Sensation: Impaired (L LE spinal still intact)          LLE AROM  L Knee Flexion: 60  L Knee Extension: 20     LLE Strength  L Hip Flexion: 2+  L Knee Extension: 2+  L Ankle Dorsiflexion: 2+    Bed Mobility  Supine to Sit: Contact guard assistance  Scooting: Contact guard assistance    Transfers  Sit to Stand: Minimum assistance  Stand to Sit: Minimum assistance  Bed to Chair: Minimum assistance    Balance  Sitting: Intact  Standing: Pull to stand; With support              Weight Bearing Status  Right Side Weight Bearing: As tolerated  Distance (ft): 15 Feet (ft) (x2)  Ambulation - Level of Assistance: Minimal assistance  Assistive Device: Walker, rolling  Base of Support: Narrowed  Speed/Libby: Slow  Step Length: Left shortened  Stance: Right decreased  Gait Abnormalities: Antalgic; Step to gait  Interventions: Safety awareness training; Tactile cues; Verbal cues; Visual/Demos     Braces/Orthotics:     Right Knee Cold  Type: Cryocuff      Body Structures Involved:  Bones  Joints  Muscles Body Functions Affected: Movement Related Activities and Participation Affected:  General Tasks and Demands  Mobility  Self Care   Number of elements that affect the Plan of Care: 4+: HIGH COMPLEXITY   CLINICAL PRESENTATION:   Presentation: Stable and uncomplicated: LOW COMPLEXITY   CLINICAL DECISION MAKIN[address] [place] [address] AM-PAC 6 Clicks   Basic Mobility Inpatient Short Form  How much difficulty does the patient currently have. .. Unable A Lot A Little None   1. Turning over in bed (including adjusting bedclothes, sheets and blankets)? [] 1   [] 2   [x] 3   [] 4   2. Sitting down on and standing up from a chair with arms ( e.g., wheelchair, bedside commode, etc.)   [] 1   [] 2   [x] 3   [] 4   3. Moving from lying on back to sitting on the side of the bed? [] 1   [] 2   [x] 3   [] 4   How much help from another person does the patient currently need. .. Total A Lot A Little None   4. Moving to and from a bed to a chair (including a wheelchair)? [] 1   [] 2   [x] 3   [] 4   5. Need to walk in hospital room? [] 1   [x] 2   [] 3   [] 4   6. Climbing 3-5 steps with a railing? [x] 1   [] 2   [] 3   [] 4   © , Trustees of [address] [place] [address], under license to [employer]. All rights reserved     Score:  Initial: 15 Most Recent: X (Date: -- )    Interpretation of Tool:  Represents activities that are increasingly more difficult (i.e. Bed mobility, Transfers, Gait). Medical Necessity:     Patient demonstrates   good   rehab potential due to higher previous functional level.   Reason for Services/Other Comments:  Patient   continues to require present interventions due to patient's inability to perform functional mobility at a safe supervision level  . Use of outcome tool(s) and clinical judgement create a POC that gives a: Clear prediction of patient's progress: LOW COMPLEXITY            TREATMENT:   (In addition to Assessment/Re-Assessment sessions the following treatments were rendered)     Pre-treatment Symptoms/Complaints:  still numb from spinal, little to no pain in R knee  Pain Initial:   Pain Intensity 1: 0  Pain Location 1: Knee  Pain Orientation 1: Right  Pain Intervention(s) 1: Ambulation/Increased Activity, Cold pack, Exercise, Elevation, Repositioned  Post Session:  0, coolJet applied to R knee and LEs elevated     Therapeutic Activity: (  10 Minutes ):  Therapeutic activities including Bed transfers, Chair transfers, Toilet transfers, Ambulation on level ground, and review and performance of R TKA exs to improve mobility, strength, balance, and coordination. Required minimal Safety awareness training; Tactile cues; Verbal cues; Visual/Demos to  insure safe technique . Assessment provided today    Date:  8/19/21 Date:   Date:     ACTIVITY/EXERCISE AM PM AM PM AM PM   GROUP THERAPY  []  []  []  []  []  []   Ankle Pumps  10       Quad Sets  10       Gluteal Sets  10       Hip ABd/ADduction  10       Straight Leg Raises  10       Knee Slides  10AA       Short Arc Quads  10       Long Arc Quads         Chair Slides                  B = bilateral; AA = active assistive; A = active; P = passive      Treatment/Session Assessment:     Response to Treatment:  did great for the lack of proprioception that she has.     Education:  [x] Home Exercises  [x] Fall Precautions  [x] Use of Cold Therapy Unit [] D/C Instruction Review  [] Knee Prosthesis Review  [x] Walker Management/Safety [] Adaptive Equipment as Needed       Interdisciplinary Collaboration:   Physical Therapist  Occupational Therapist  Registered Nurse    After treatment position/precautions:   Up in chair  Bed/Chair-wheels locked  Call light within reach  RN notified    Compliance with Program/Exercises: Will assess as treatment progresses. Recommendations/Intent for next treatment session:  Treatment next visit will focus on increasing Ms. Igor Elizabeth independence with bed mobility, transfers, gait training, strength/ROM exercises, modalities for pain, and patient education.       Total Treatment Duration:  PT Patient Time In/Time Out  Time In: 1315  Time Out: [address]

## 2021-08-19 NOTE — PROGRESS NOTES
Patient arrived via bed into room. Patient alert and oriented. Patient able to move lower extremities due to surgery. Pedal pulses present 2+. SCD applied. Neurovascular status WNL. Dressing is clean, dry and intact. Johnson Kaden in place. Has not voided at this time. Admission assessment complete. Discuss diet and pain. Bed is low and locked. Call light within reach. Instructed to call for assistance. Verbalized understanding.  at bedside.

## 2021-08-19 NOTE — ANESTHESIA PROCEDURE NOTES
Spinal Block    Start time: 8/19/2021 8:55 AM  End time: 8/19/2021 9:01 AM  Performed by: Diamond Chan MD  Authorized by: Diamond Chan MD     Pre-procedure:   Indications: at surgeon's request, post-op pain management, procedure for pain and primary anesthetic  Preanesthetic Checklist: patient identified, risks and benefits discussed, anesthesia consent, site marked, patient being monitored and timeout performed    Timeout Time: 08:55 EDT          Spinal Block:   Patient Position:  Seated  Prep Region:  Lumbar  Prep: chlorhexidine      Location:  L3-4  Technique:  Single shot        Needle:   Needle Type:  Pencan  Needle Gauge:  25 G  Attempts:  1      Events: CSF confirmed, no blood with aspiration and no paresthesia        Assessment:  Insertion:  Uncomplicated  Patient tolerance:  Patient tolerated the procedure well with no immediate complications

## 2021-08-19 NOTE — PERIOP NOTES
TRANSFER - OUT REPORT:    Verbal report given to [facility] RN on Nakul Joaquin  being transferred to [facility] for routine post - op       Report consisted of patients Situation, Background, Assessment and   Recommendations(SBAR). Information from the following report(s) SBAR, OR Summary, MAR and Cardiac Rhythm NSR was reviewed with the receiving nurse. Lines:   Peripheral IV 08/19/21 Left;Posterior Arm (Active)   Site Assessment Clean, dry, & intact 08/19/21 1125   Phlebitis Assessment 0 08/19/21 1125   Infiltration Assessment 0 08/19/21 1125   Dressing Status Clean, dry, & intact 08/19/21 1125   Dressing Type Tape;Transparent 08/19/21 1125   Hub Color/Line Status Infusing 08/19/21 1125        Opportunity for questions and clarification was provided. Patient transported with:   O2 @ 2 liters  Registered Nurse    VTE prophylaxis orders have been written for Nakul Joaquin. Patient and family given floor number and nurses name. Family updated re: pt status after security code verified.

## 2021-08-19 NOTE — PROGRESS NOTES
Care Management Interventions  PCP Verified by CM: Yes  Mode of Transport at Discharge: Self  Transition of Care Consult (CM Consult): [address]: Yes  Discharge Durable Medical Equipment: [facility]side [facility])  Physical Therapy Consult: Yes  Current Support Network: Lives Alone  Confirm Follow Up Transport: Family  Discharge Location  Discharge Placement: Home with home health    Patient is a 76y.o. year old female admitted for Right TKA . Patient plans to return home on discharge. Order received to arrange home health. Patient without preference towards agency. Referral sent to [facility]. Pt requeted a walker and bedside commode. Patient requesting we arrange a walker and bedside commode. Referral sent to [address] [place] [address] Str. delivered to the hospital room prior to discharge. Will follow until discharge.      MARKEL Cid

## 2021-08-19 NOTE — PROGRESS NOTES
Problem: Self Care Deficits Care Plan (Adult)  Goal: *Acute Goals and Plan of Care (Insert Text)  Outcome: Progressing Towards Goal  Note: GOALS:   DISCHARGE GOALS (in preparation for going home/rehab):  3 days  1. Ms. Tigre Nagy will perform one lower body dressing activity with stand by assist required to demonstrate improved functional mobility and safety. 2.  Ms. Tigre Nagy will perform one lower body bathing activity with stand by assist required to demonstrate improved functional mobility and safety. 3.  Ms. Tigre Nagy will perform toileting/toilet transfer with stand by assist to demonstrate improved functional mobility and safety. 4.  Ms. Tigre Nagy will perform shower transfer with stand by assist to demonstrate improved functional mobility and safety. JOINT CAMP OCCUPATIONAL THERAPY TKA: Initial Assessment and Daily Note 8/19/2021  INPATIENT: Hospital Day: 1  Payor: [address] [place] [address],9D / Plan: [address] / Product Type: [employer] Care Medicare /      NAME/AGE/GENDER: Hamilton Lowry is a 76 y.o. female   PRIMARY DIAGNOSIS:  Right knee pain, unspecified chronicity [M25.561]  Arthritis of right knee [M17.11]   Procedure(s) and Anesthesia Type:     * RIGHT KNEE ARTHROPLASTY TOTAL SMITH/NEPHEW - Spinal (Right)  ICD-10: Treatment Diagnosis:    Pain in Right Knee (M25.561)  Stiffness of Right Knee, Not elsewhere classified (X42.283)      ASSESSMENT:     Ms. Tigre Nagy is s/p Right TKA and presents with decreased weight bearing on R LE and decreased independence with functional mobility and activities of daily living as compared to baseline level of function and safety. Patient would benefit from skilled Occupational Therapy to maximize independence and safety with self-care task and functional mobility. Pt would also benefit from education on adaptive equipment and safety precautions in preparation for going home.         Patient able to don underwear at toilet with assist. Mobilized from bed to toilet to recliner using a rolling walker with assist. Not coordinated due to nerve block but still did well with extra verbal cues. Should progress well with ADL's tomorrow. This section established at most recent assessment   PROBLEM LIST (Impairments causing functional limitations):  Decreased Strength  Decreased ADL/Functional Activities  Decreased Transfer Abilities  Increased Pain  Increased Fatigue  Decreased Flexibility/Joint Mobility  Decreased Knowledge of Precautions   INTERVENTIONS PLANNED: (Benefits and precautions of occupational therapy have been discussed with the patient.)  Activities of daily living training  Adaptive equipment training  Balance training  Clothing management  Donning&doffing training  Theraputic activity     TREATMENT PLAN: Frequency/Duration: Follow patient 1-2tx to address above goals. Rehabilitation Potential For Stated Goals: Good     RECOMMENDED REHABILITATION/EQUIPMENT: (at time of discharge pending progress): Continue Skilled Therapy. OCCUPATIONAL PROFILE AND HISTORY:   History of Present Injury/Illness (Reason for Referral): Pt presents this date s/p (Right) TKA.     Past Medical History/Comorbidities:   Ms. Joana Cavazos  has a past medical history of Allergic rhinitis, Anxiety, Arthritis, Asthma, Celiac disease, COVID-19 vaccine series completed (3/30/21, 4/29/21), Depression, DVT (deep vein thrombosis) in pregnancy (2015), Dysthymic disorder, Esophageal reflux, Exophthalmos, Fibromyalgia, GERD (gastroesophageal reflux disease), Hiatal hernia, Hot flashes, Hypercholesterolemia, Hyperlipidemia, Hypertension, IBS (irritable bowel syndrome), SABIHA (iron deficiency anemia), Impaired fasting glucose, Liver disease, Lumbago, Malaise and fatigue, Morbid obesity ([address] Nonallopathic lesion of sacral region, KELLY (obstructive sleep apnea), Osteoarthritis, Osteoporosis, Osteoporosis, Other speech disturbances, PUD (peptic ulcer disease), Pulmonary embolism ([facility] (2015), Pulmonary hypertension, mild ([facility] RA (rheumatoid arthritis) ([facility] RLS (restless legs syndrome), Sarcoidosis, Sciatic neuralgia, Tarsal tunnel syndrome, Urinary incontinence, Wears glasses, and Wears partial dentures. Ms. Komal Navarrete  has a past surgical history that includes hx hysterectomy (1978); hx orthopaedic; hx tonsillectomy; hx carpal tunnel release (Right, 1997); hx cholecystectomy; hx gastrectomy (04/17/2018); hx rotator cuff repair (Right, 2018); hx breast reduction; hx other surgical (05/14/2018); and hx other surgical (04/13/2018). Social History/Living Environment:   Home Environment: Private residence  # Steps to Enter: 1  One/Two Story Residence: Two story, live on 1st floor  # of Interior Steps: 17  Living Alone: No  Support Systems: Child(josee); Friends \ neighbors  Patient Expects to be Discharged to[de-identified] [employer]  Current DME Used/Available at Home: Cane, straight  Tub or Shower Type: Tub/Shower combination    Prior Level of Function/Work/Activity:  Independent prior. Number of Personal Factors/Comorbidities that affect the Plan of Care: Brief history (0):  LOW COMPLEXITY   ASSESSMENT OF OCCUPATIONAL PERFORMANCE[de-identified]   Most Recent Physical Functioning:   Balance  Sitting: Intact  Standing: Pull to stand; With support       Gross Assessment  AROM: Within functional limits (L LE)  Strength: Generally decreased, functional (L LE 3+)  Sensation: Impaired (L LE spinal still intact)          LLE AROM  L Knee Flexion: 60  L Knee Extension: 20  LLE Strength  L Hip Flexion: 2+  L Knee Extension: 2+  L Ankle Dorsiflexion: 2+  LLE Sensation  Light Touch: Partial deficit  Proprioception: Partial deficit Coordination  Fine Motor Skills-Upper: Left Intact; Right Intact  Gross Motor Skills-Upper: Left Intact; Right Intact         Mental Status  Neurologic State: Alert  Orientation Level: Oriented X4  Cognition: Appropriate decision making  Perception: Appears intact  Perseveration: No perseveration noted  Safety/Judgement: Awareness of environment                Basic ADLs (From Assessment) Complex ADLs (From Assessment)   Basic ADL  Feeding: Independent  Oral Facial Hygiene/Grooming: Setup  Bathing: Minimum assistance  Upper Body Dressing: Setup  Lower Body Dressing: Moderate assistance  Toileting: Moderate assistance     Grooming/Bathing/Dressing Activities of Daily Living     Cognitive Retraining  Safety/Judgement: Awareness of environment                 Functional Transfers  Bathroom Mobility: Minimum assistance  Toilet Transfer : Minimum assistance  Shower Transfer: Moderate assistance     Bed/Mat Mobility  Supine to Sit: Contact guard assistance  Sit to Stand: Minimum assistance  Stand to Sit: Minimum assistance  Bed to Chair: Minimum assistance  Scooting: Contact guard assistance         Physical Skills Involved:  Range of Motion  Balance  Strength  Activity Tolerance Cognitive Skills Affected (resulting in the inability to perform in a timely and safe manner):  WFL Psychosocial Skills Affected:  WFL   Number of elements that affect the Plan of Care: 1-3:  LOW COMPLEXITY   CLINICAL DECISION MAKIN[address] [place] [id number] AM-PAC 6 Clicks   Daily Activity Inpatient Short Form  How much help from another person does the patient currently need. .. Total A Lot A Little None   1. Putting on and taking off regular lower body clothing? [] 1   [] 2   [] 3   [] 4   2. Bathing (including washing, rinsing, drying)? [] 1   [] 2   [] 3   [] 4   3. Toileting, which includes using toilet, bedpan or urinal?   [] 1   [] 2   [] 3   [] 4   4. Putting on and taking off regular upper body clothing? [] 1   [] 2   [] 3   [] 4   5. Taking care of personal grooming such as brushing teeth? [] 1   [] 2   [] 3   [] 4   6. Eating meals? [] 1   [] 2   [] 3   [] 4   © , Trustees of [address] [place] [id number], under license to [employer].  All rights reserved     Score:  Initial: 18 Most Recent: X (Date: -- ) Interpretation of Tool:  Represents activities that are increasingly more difficult (i.e. Bed mobility, Transfers, Gait). Medical Necessity:     Skilled intervention continues to be required due to Deficits noted above. Reason for Services/Other Comments:  Patient continues to require skilled intervention due to   New TKA  . Use of outcome tool(s) and clinical judgement create a POC that gives a: LOW COMPLEXITY            TREATMENT:   (In addition to Assessment/Re-Assessment sessions the following treatments were rendered)     Pre-treatment Symptoms/Complaints:    Pain: Initial:   Pain Intensity 1: 0  Pain Location 1: Knee  Pain Orientation 1: Right  Post Session:  0     Self Care: (10): Procedure(s) (per grid) utilized to improve and/or restore self-care/home management as related to dressing, toileting, grooming, and transfers . Required minimal verbal and tactile cueing to facilitate activities of daily living skills. Initial evaluation 10 mintues. Treatment/Session Assessment:     Response to Treatment:  Good, sitting up in recliner. Education:  [] Home Exercises  [x] Fall Precautions  [] Hip Precautions [] Going Home Video  [x] Knee/Hip Prosthesis Review  [x] Walker Management/Safety [x] Adaptive Equipment as Needed       Interdisciplinary Collaboration:   Physical Therapist  Occupational Therapist  Registered Nurse    After treatment position/precautions:   Up in chair  Bed/Chair-wheels locked  Caregiver at bedside  Call light within reach  RN notified     Compliance with Program/Exercises: Compliant all of the time, Will assess as treatment progresses. Recommendations/Intent for next treatment session:  Treatment next visit will focus on increasing Ms. Ovi Dorado independence with bed mobility, transfers, self care, functional mobility, modalities for pain, and patient education.       Total Treatment Duration:  OT Patient Time In/Time Out  Time In: 1330  Time Out: [place] Renna Osgood

## 2021-08-19 NOTE — PROGRESS NOTES
Update- No interval change. Site marked.  To OR today for Procedure(s) (LRB):  RIGHT KNEE ARTHROPLASTY TOTAL SMITH/NEPHEW (Right)

## 2021-08-19 NOTE — OP NOTES
[id number] [address]  OPERATIVE REPORT    Name:  Deya Jara  MR#:  [id number]  :  1955  ACCOUNT #:  [de-identified]  DATE OF SERVICE:  2021    PREOPERATIVE DIAGNOSIS:  Right knee valgus osteoarthritis. POSTOPERATIVE DIAGNOSIS:  Right knee valgus osteoarthritis. PROCEDURE PERFORMED:  Right total knee arthroplasty. SURGEON:  Avani Navarro MD    ASSISTANT:  None. ANESTHESIA:  Regional.    COMPLICATIONS:  None. SPECIMENS REMOVED:  Resected bone for routine disposal.    IMPLANTS:  Smith [employer]hew size 4 JOURNEY II Bicruciate Substituting Oxinium femur, size 4 tibia, 11 mm stabilized polyethylene, 29 mm patella. ESTIMATED BLOOD LOSS:  150 mL. MEDICATIONS:  Naropin periarticular cocktail, vancomycin intra-articular powder, tranexamic acid IV protocol. PROCEDURE IN DETAIL:  After discussion of risks, benefits, and alternatives, the patient expressed understanding and strongly desired to proceed with surgical treatment. She was brought to the operating room. Regional anesthesia had already been administered and the right lower extremity was prepped and draped in the normal sterile fashion. Time-out was then performed. A median parapatellar approach was performed. Tricompartmental degenerative change was noted which was most significant in the lateral compartment. Intramedullary alignment was used to perform the distal femoral resection. Extramedullary alignment was used to perform the tibial resection. Extension gap balancing was performed. Appropriate soft tissue releases and resections were performed. Flexion gap balancing was used to set femoral rotation and size the femur. Anterior, posterior, and chamfer cuts were performed. Posterior stabilized box was prepared. Trialing was performed. Tibial rotation was marked and tibial fin punch was prepared. Patellar resection was performed and a 29 mm patella was selected.   Bony preparation was performed for all components. The periarticular soft tissues were treated with a radiofrequency device. The tourniquet was inflated. Cut bony surfaces were copiously irrigated and dried. Antibiotic-impregnated cement was used to coat the bone implant interface as a size 4 JOURNEY II tibia, size 4 Bicruciate Substituting Oxinium femur, trial polyethylene and 29 mm patella polyethylene were placed. Extruded cement was removed from the operative field at each stage of implantation. I ultimately chose an 11 mm polyethylene. The trial polyethylene was removed. Copious irrigation was performed at length. Periarticular injection with Naropin cocktail was performed. An 11 mm polyethylene was then placed. The knee was then copiously irrigated and closed in layered fashion. Vancomycin intra-articular powder was placed in the deep aspect of the wound. After layered wound closure, sterile dressings were applied. The patient tolerated the procedure well. There were no complications. No specimen was sent to Pathology    POSTOPERATIVE PLAN:  Standard postoperative antibiotic and DVT prophylaxis. Multimodal pain management.   The patient does have a history of rash with narcotics, but this has responded well to Benadryl coadministration in the past.      Clari Knapp MD      [id number]  D:  08/19/2021 10:37  T:  08/19/2021 12:27  JOB #:  [id number]

## 2021-08-19 NOTE — PROGRESS NOTES
08/19/21 1423   Oxygen Therapy   O2 Sat (%) 99 %   Pulse via Oximetry 107 beats per minute   O2 Device None (Room air)   O2 Flow Rate (L/min) 0 l/min   Incentive Spirometry Treatment   Actual Volume (ml) 1200 ml   Number of Attempts 3   Patient achieved  1200   Ml/sec on IS. Patient encouraged to do every hour while awake-patient agreed and demonstrated. No shortness of breath or distress noted. BS are clear b/l. [facility] notes reviewed- continuous sat  ordered HS.   BLBS are clear, patient is alert and oriented

## 2021-08-19 NOTE — ANESTHESIA PROCEDURE NOTES
Peripheral Block    Start time: 8/19/2021 8:37 AM  End time: 8/19/2021 8:39 AM  Performed by: Juan Ramon Sandoval MD  Authorized by: Juan Ramon Sandoval MD       Pre-procedure: Indications: at surgeon's request, post-op pain management and procedure for pain    Preanesthetic Checklist: patient identified, risks and benefits discussed, site marked, timeout performed, anesthesia consent given and patient being monitored    Timeout Time: 08:37 EDT          Block Type:   Block Type:   Adductor canal  Laterality:  Right  Monitoring:  Standard ASA monitoring, responsive to questions, oxygen, continuous pulse ox, frequent vital sign checks and heart rate  Injection Technique:  Single shot  Procedures: ultrasound guided    Patient Position: supine  Prep: chlorhexidine    Location:  Mid thigh  Needle Type:  Stimuplex  Needle Gauge:  22 G  Needle Localization:  Ultrasound guidance  Medication Injected:  Ropivacaine (NAROPIN) 2 mg/mL (0.2 %) injection, 40 mg  dexamethasone (DECADRON) 4 mg/mL injection, 4 mg  Med Admin Time: 8/19/2021 8:41 AM    Assessment:  Number of attempts:  1  Injection Assessment:  Incremental injection every 5 mL, no paresthesia, ultrasound image on chart, no intravascular symptoms, negative aspiration for blood and local visualized surrounding nerve on ultrasound  Patient tolerance:  Patient tolerated the procedure well with no immediate complications

## 2021-08-19 NOTE — PERIOP NOTES
Teach back method used with patient concerning antiseptic wash, TB screening, incentive spirometer, and pain management goals. Patient and family were provided with home discharge needs list. Patient denies vena cava filter when reviewing history.

## 2021-08-19 NOTE — ANESTHESIA PREPROCEDURE EVALUATION
Anesthetic History               Review of Systems / Medical History  Patient summary reviewed and pertinent labs reviewed    Pulmonary        Sleep apnea: CPAP    Asthma : well controlled       Neuro/Psych         Psychiatric history (Depression and anxiety)     Cardiovascular    Hypertension: well controlled              Exercise tolerance: >4 METS  Comments: Denies recent CP, SOB or changes in functional status  Nml ECHO and stress test in 2016   GI/Hepatic/Renal     GERD: well controlled      PUD and liver disease (Fatty)     Endo/Other        Morbid obesity and arthritis     Other Findings   Comments: Fibromyalgia  RLS  DVT - Remote           Physical Exam    Airway  Mallampati: II  TM Distance: 4 - 6 cm  Neck ROM: normal range of motion   Mouth opening: Normal     Cardiovascular    Rhythm: regular  Rate: normal         Dental    Dentition: Full upper dentures     Pulmonary  Breath sounds clear to auscultation               Abdominal  GI exam deferred       Other Findings            Anesthetic Plan    ASA: 3  Anesthesia type: spinal      Post-op pain plan if not by surgeon: peripheral nerve block single    Induction: Intravenous  Anesthetic plan and risks discussed with: Patient
no

## 2021-08-19 NOTE — PROGRESS NOTES
TRANSFER - IN REPORT:    Verbal report received from [facility], RN(name) on William Heredia  being received from [facility]) for routine post - op      Report consisted of patients Situation, Background, Assessment and   Recommendations(SBAR). Information from the following report(s) SBAR, Kardex, OR Summary, Procedure Summary, Intake/Output and MAR was reviewed with the receiving nurse. Opportunity for questions and clarification was provided. Assessment completed upon patients arrival to unit and care assumed.

## 2021-08-20 ENCOUNTER — APPOINTMENT (OUTPATIENT)
Dept: ULTRASOUND IMAGING | Age: 66
End: 2021-08-20
Attending: PHYSICIAN ASSISTANT
Payer: MEDICARE

## 2021-08-20 PROBLEM — Z96.651 STATUS POST TOTAL KNEE REPLACEMENT, RIGHT: Status: ACTIVE | Noted: 2021-08-20

## 2021-08-20 LAB — HGB BLD-MCNC: 10.9 G/DL (ref 11.7–15.4)

## 2021-08-20 PROCEDURE — 97530 THERAPEUTIC ACTIVITIES: CPT

## 2021-08-20 PROCEDURE — 74011250637 HC RX REV CODE- 250/637: Performed by: PHYSICIAN ASSISTANT

## 2021-08-20 PROCEDURE — 2709999900 HC NON-CHARGEABLE SUPPLY

## 2021-08-20 PROCEDURE — 85018 HEMOGLOBIN: CPT

## 2021-08-20 PROCEDURE — 74011250636 HC RX REV CODE- 250/636: Performed by: ORTHOPAEDIC SURGERY

## 2021-08-20 PROCEDURE — 93970 EXTREMITY STUDY: CPT

## 2021-08-20 PROCEDURE — 97110 THERAPEUTIC EXERCISES: CPT

## 2021-08-20 PROCEDURE — 97116 GAIT TRAINING THERAPY: CPT

## 2021-08-20 PROCEDURE — 74011250637 HC RX REV CODE- 250/637: Performed by: ORTHOPAEDIC SURGERY

## 2021-08-20 PROCEDURE — 94760 N-INVAS EAR/PLS OXIMETRY 1: CPT

## 2021-08-20 PROCEDURE — 74011000250 HC RX REV CODE- 250: Performed by: ORTHOPAEDIC SURGERY

## 2021-08-20 PROCEDURE — 36415 COLL VENOUS BLD VENIPUNCTURE: CPT

## 2021-08-20 PROCEDURE — 97535 SELF CARE MNGMENT TRAINING: CPT

## 2021-08-20 RX ORDER — OXYCODONE HYDROCHLORIDE 5 MG/1
5 TABLET ORAL
Qty: 18 TABLET | Refills: 0 | Status: SHIPPED | OUTPATIENT
Start: 2021-08-20 | End: 2021-08-23

## 2021-08-20 RX ADMIN — ACETAMINOPHEN 1000 MG: 500 TABLET, FILM COATED ORAL at 06:10

## 2021-08-20 RX ADMIN — FLUTICASONE PROPIONATE 2 SPRAY: 50 SPRAY, METERED NASAL at 09:00

## 2021-08-20 RX ADMIN — DOCUSATE SODIUM 50MG AND SENNOSIDES 8.6MG 2 TABLET: 8.6; 5 TABLET, FILM COATED ORAL at 08:07

## 2021-08-20 RX ADMIN — ACETAMINOPHEN 1000 MG: 500 TABLET, FILM COATED ORAL at 16:17

## 2021-08-20 RX ADMIN — VITAMIN D, TAB 1000IU (100/BT) 1000 UNITS: 25 TAB at 14:02

## 2021-08-20 RX ADMIN — HYDROMORPHONE HYDROCHLORIDE 1 MG: 1 INJECTION, SOLUTION INTRAMUSCULAR; INTRAVENOUS; SUBCUTANEOUS at 08:04

## 2021-08-20 RX ADMIN — DIPHENHYDRAMINE HYDROCHLORIDE 25 MG: 25 CAPSULE ORAL at 00:41

## 2021-08-20 RX ADMIN — ACETAMINOPHEN 1000 MG: 500 TABLET, FILM COATED ORAL at 00:41

## 2021-08-20 RX ADMIN — APIXABAN 2.5 MG: 2.5 TABLET, FILM COATED ORAL at 20:56

## 2021-08-20 RX ADMIN — APIXABAN 2.5 MG: 2.5 TABLET, FILM COATED ORAL at 14:02

## 2021-08-20 RX ADMIN — DIPHENHYDRAMINE HYDROCHLORIDE 25 MG: 25 CAPSULE ORAL at 09:42

## 2021-08-20 RX ADMIN — Medication 1 AMPULE: at 20:56

## 2021-08-20 RX ADMIN — VENLAFAXINE HYDROCHLORIDE 150 MG: 150 CAPSULE, EXTENDED RELEASE ORAL at 08:07

## 2021-08-20 RX ADMIN — Medication 1 TABLET: at 08:07

## 2021-08-20 RX ADMIN — Medication 81 MG: at 08:07

## 2021-08-20 RX ADMIN — HYDROMORPHONE HYDROCHLORIDE 1 MG: 1 INJECTION, SOLUTION INTRAMUSCULAR; INTRAVENOUS; SUBCUTANEOUS at 14:02

## 2021-08-20 RX ADMIN — HYDROMORPHONE HYDROCHLORIDE 1 MG: 1 INJECTION, SOLUTION INTRAMUSCULAR; INTRAVENOUS; SUBCUTANEOUS at 00:41

## 2021-08-20 RX ADMIN — OXYCODONE 10 MG: 5 TABLET ORAL at 20:57

## 2021-08-20 RX ADMIN — OXYCODONE 10 MG: 5 TABLET ORAL at 10:34

## 2021-08-20 RX ADMIN — PANTOPRAZOLE SODIUM 40 MG: 40 TABLET, DELAYED RELEASE ORAL at 08:07

## 2021-08-20 RX ADMIN — DIPHENHYDRAMINE HYDROCHLORIDE 25 MG: 25 CAPSULE ORAL at 16:20

## 2021-08-20 RX ADMIN — OXYCODONE 10 MG: 5 TABLET ORAL at 06:09

## 2021-08-20 RX ADMIN — CETIRIZINE HYDROCHLORIDE 10 MG: 10 TABLET, FILM COATED ORAL at 08:07

## 2021-08-20 RX ADMIN — Medication 10 ML: at 06:10

## 2021-08-20 RX ADMIN — Medication 1 AMPULE: at 08:08

## 2021-08-20 RX ADMIN — ATORVASTATIN CALCIUM 80 MG: 40 TABLET, FILM COATED ORAL at 08:07

## 2021-08-20 RX ADMIN — Medication 10 ML: at 20:57

## 2021-08-20 RX ADMIN — OXYCODONE 10 MG: 5 TABLET ORAL at 16:17

## 2021-08-20 RX ADMIN — CEFAZOLIN SODIUM 2 G: 100 INJECTION, POWDER, LYOPHILIZED, FOR SOLUTION INTRAVENOUS at 00:42

## 2021-08-20 RX ADMIN — ACETAMINOPHEN 1000 MG: 500 TABLET, FILM COATED ORAL at 23:54

## 2021-08-20 NOTE — PROGRESS NOTES
Procedure(s):  RIGHT KNEE ARTHROPLASTY TOTAL SMITH/NEPHEW    Patient seen POD #1    Pt ambulating in room, no complaints of N/V, pain well controlled, taking p.o. No anesthesia complications post operatively.     Visit Vitals  /63   Pulse 85   Temp 36.6 °C (97.8 °F)   Resp 20   Wt 88 kg (193 lb 14.4 oz)   SpO2 97%   BMI 32.27 kg/m²

## 2021-08-20 NOTE — PROGRESS NOTES
Problem: Mobility Impaired (Adult and Pediatric)  Goal: *Acute Goals and Plan of Care (Insert Text)  Note: GOALS (1-4 days):  (1.)Ms. Siri Mann will move from supine to sit and sit to supine  in bed with SUPERVISION. (2.)Ms. Siri Mann will transfer from bed to chair and chair to bed with SUPERVISION using the least restrictive device. (3.)Ms. Siri Mann will ambulate with SUPERVISION for 250 feet with the least restrictive device. (4.)Ms. Siri Mann will ambulate up/down 4 steps with bilateral  railing with CONTACT GUARD ASSIST with no device. (5.)Ms. Siri Mann will increase right knee ROM to 5°-90°.  ________________________________________________________________________________________________      PHYSICAL THERAPY JOINT CAMP TKA: Daily Note, Treatment Day: 1st and PM 8/20/2021  INPATIENT: Hospital Day: 2  Payor: Hector Tomas / Plan: [employer] / Product Type: [employer] /      NAME/AGE/GENDER: Devon Gilliam is a 76 y.o. female   PRIMARY DIAGNOSIS:  Right knee pain, unspecified chronicity [M25.561]  Arthritis of right knee [M17.11]   Procedure(s) and Anesthesia Type:     * RIGHT KNEE ARTHROPLASTY TOTAL SMITH/NEPHEW - Spinal (Right)  ICD-10: Treatment Diagnosis:    · Pain in Right Knee (M25.561)  · Stiffness of Right Knee, Not elsewhere classified (M25.661)  · Other abnormalities of gait and mobility (R26.89)      ASSESSMENT:     Ms. Siri Mann presents S/P R TKA and is experiencing a decline in her premorbid level of function. She would benefit from further PT while here to address these deficits: decreased R LE strength and ROM, standing balance, functional mobility and TKA awareness. She plans on going home at [place] with family support and  PT. This pm, she is still numb from her spinal. She is asking to use the restroom. She is limited with her distance due to diminished proprioception and safety. She returns to recliner and performs her R TKA exs.  We review these. She also performs toilet transfer with min assist.  8/20- pt up in chair on contact and agreeable to therapy. Worked on her TKA exercises in the chair with verbal cues and demonstration progressing with repetitions. Pt states she has a written HEP she was given before surgery. She wanted to get dressed, we then worked on gait training in the pierce with verbal cues for reciprocal gait pattern. Great progress with distance. Practiced going up and down stairs with verbal cues. She walked back to her room with continued cues but demonstrating good technique. She stayed up in chair with ice on knee and needs in reach. She is waiting to talk to the MD but she anticipates discharge tomorrow. In pm pt supine on contact and agreeable to therapy. We worked on her TKA exercises in supine with verbal cues. Worked on bed mobility, sit to stand and gait training in the pierce with verbal cues. She returned to room and wanted to get back in the bed. Ice on knee and needs in reach. She plans to go home tomorrow with HHPT for follow up. This section established at most recent assessment   PROBLEM LIST (Impairments causing functional limitations):  1. Decreased Strength  2. Decreased ADL/Functional Activities  3. Decreased Transfer Abilities  4. Decreased Ambulation Ability/Technique  5. Decreased Balance  6. Decreased Activity Tolerance  7. Increased Fatigue  8. Decreased Flexibility/Joint Mobility  9. Decreased Knowledge of Precautions  10. Decreased Centre with Home Exercise Program   INTERVENTIONS PLANNED: (Benefits and precautions of physical therapy have been discussed with the patient.)  1. Bed Mobility  2. Cold  3. Gait Training  4. Home Exercise Program (HEP)  5. Range of Motion (ROM)  6. Therapeutic Activites  7. Therapeutic Exercise/Strengthening  8. Transfer Training  9. TKA education     TREATMENT PLAN: Frequency/Duration: Follow patient BID for duration of hospital stay to address above goals. Rehabilitation Potential For Stated Goals: Good     RECOMMENDED REHABILITATION/EQUIPMENT: (at time of discharge pending progress): Continue Skilled Therapy and Home Health: Physical Therapy. HISTORY:   History of Present Injury/Illness (Reason for Referral):  S/P R TKA  Past Medical History/Comorbidities:   Ms. Genesis Dykes  has a past medical history of Allergic rhinitis, Anxiety, Arthritis, Asthma, Celiac disease, COVID-19 vaccine series completed (3/30/21, 4/29/21), Depression, DVT (deep vein thrombosis) in pregnancy (2015), Dysthymic disorder, Esophageal reflux, Exophthalmos, Fibromyalgia, GERD (gastroesophageal reflux disease), Hiatal hernia, Hot flashes, Hypercholesterolemia, Hyperlipidemia, Hypertension, IBS (irritable bowel syndrome), SABIHA (iron deficiency anemia), Impaired fasting glucose, Liver disease, Lumbago, Malaise and fatigue, Morbid obesity ([address] Nonallopathic lesion of sacral region, KELLY (obstructive sleep apnea), Osteoarthritis, Osteoporosis, Osteoporosis, Other speech disturbances, PUD (peptic ulcer disease), Pulmonary embolism ([address] (2015), Pulmonary hypertension, mild ([address] RA (rheumatoid arthritis) ([address] RLS (restless legs syndrome), Sarcoidosis, Sciatic neuralgia, Tarsal tunnel syndrome, Urinary incontinence, Wears glasses, and Wears partial dentures. Ms. Genesis Dykes  has a past surgical history that includes hx hysterectomy (1978); hx orthopaedic; hx tonsillectomy; hx carpal tunnel release (Right, 1997); hx cholecystectomy; hx gastrectomy (04/17/2018); hx rotator cuff repair (Right, 2018); hx breast reduction; hx other surgical (05/14/2018); and hx other surgical (04/13/2018). Social History/Living Environment:   Home Environment: Private residence  # Steps to Enter: 1  One/Two Story Residence: Two story, live on 1st floor  # of Interior Steps: 17  Living Alone: No  Support Systems: Family member(s); Spouse/Significant Other/Partner  Patient Expects to be Discharged to[de-identified] House  Current DME Used/Available at Home: Cane, straight  Tub or Shower Type: Tub/Shower combination    Prior Level of Function/Work/Activity:  Functionally I   Number of Personal Factors/Comorbidities that affect the Plan of Care: 3+: HIGH COMPLEXITY   EXAMINATION:   Most Recent Physical Functioning:                            Bed Mobility  Supine to Sit: Supervision  Sit to Supine: Supervision    Transfers  Sit to Stand: Stand-by assistance  Stand to Sit: Stand-by assistance    Balance  Sitting: Intact  Standing: Pull to stand; With support         Gait Training: Yes    Weight Bearing Status  Right Side Weight Bearing: As tolerated  Distance (ft): 300 Feet (ft)  Ambulation - Level of Assistance: Supervision  Assistive Device: Walker, rolling  Speed/Libby: Linda Hodgkin decreased (<100 feet/min)  Step Length: Left shortened  Stance: Right decreased  Gait Abnormalities: Antalgic  Number of Stairs Trained: 3 (up and down 3 times)  Stairs - Level of Assistance: Stand-by assistance  Rail Use: Both  Interventions: Safety awareness training;Verbal cues     Braces/Orthotics:     Right Knee Cold  Type: Cryocuff      Body Structures Involved:  1. Bones  2. Joints  3. Muscles Body Functions Affected:  1. Movement Related Activities and Participation Affected:  1. General Tasks and Demands  2. Mobility  3. Self Care   Number of elements that affect the Plan of Care: 4+: HIGH COMPLEXITY   CLINICAL PRESENTATION:   Presentation: Stable and uncomplicated: LOW COMPLEXITY   CLINICAL DECISION MAKING:   [facility] AM-PAC 6 Clicks   Basic Mobility Inpatient Short Form  How much difficulty does the patient currently have. .. Unable A Lot A Little None   1. Turning over in bed (including adjusting bedclothes, sheets and blankets)? [] 1   [] 2   [x] 3   [] 4   2. Sitting down on and standing up from a chair with arms ( e.g., wheelchair, bedside commode, etc.)   [] 1   [] 2   [x] 3   [] 4   3.   Moving from lying on back to sitting on the side of the bed? [] 1   [] 2   [x] 3   [] 4   How much help from another person does the patient currently need. .. Total A Lot A Little None   4. Moving to and from a bed to a chair (including a wheelchair)? [] 1   [] 2   [x] 3   [] 4   5. Need to walk in hospital room? [] 1   [x] 2   [] 3   [] 4   6. Climbing 3-5 steps with a railing? [x] 1   [] 2   [] 3   [] 4   © 2007, Trustees of [address] [place] [address] [address] [address], under license to [employer]. All rights reserved     Score:  Initial: 15 Most Recent: X (Date: -- )    Interpretation of Tool:  Represents activities that are increasingly more difficult (i.e. Bed mobility, Transfers, Gait). Medical Necessity:     · Patient demonstrates   · good  ·  rehab potential due to higher previous functional level. Reason for Services/Other Comments:  · Patient   · continues to require present interventions due to patient's inability to perform functional mobility at a safe supervision level  · . Use of outcome tool(s) and clinical judgement create a POC that gives a: Clear prediction of patient's progress: LOW COMPLEXITY            TREATMENT:   (In addition to Assessment/Re-Assessment sessions the following treatments were rendered)     Pre-treatment Symptoms/Complaints:  Itchy, RN notified and she got benadryl  Pain Initial:     no reports of pain Post Session: sore, ice on knee     Gait Training (15 Minutes):  Gait training to improve and/or restore physical functioning as related to mobility, strength, balance and coordination. Ambulated 300 Feet (ft) with Supervision using a Walker, rolling and minimal Safety awareness training;Verbal cues related to their stance phase and stride length to promote proper body alignment and promote proper body posture. Instruction in performance of walker use and sequencing to correct stance phase and stride length. Therapeutic Exercise: (14 Minutes):  Exercises per grid below to improve mobility and strength.   Required minimal verbal cues to promote proper body alignment and promote proper body posture. Progressed repetitions as indicated. Date:  8/19/21 Date:  8/20 Date:     ACTIVITY/EXERCISE AM PM AM PM AM PM   GROUP THERAPY  []  []  []  []  []  []   Ankle Pumps  10 15 15     Quad Sets  10 15 15     Gluteal Sets  10 15 15     Hip ABd/ADduction  10 15 15     Straight Leg Raises  10 15 15     Knee Slides  10AA 15 15     Short Arc Quads  10 15 15     Long Arc Quads         Chair Slides   15               B = bilateral; AA = active assistive; A = active; P = passive      Treatment/Session Assessment:     Response to Treatment:  Pt continues to move well    Education:  [x] Home Exercises  [x] Fall Precautions  [x] Use of Cold Therapy Unit [x] D/C Instruction Review  [] Knee Prosthesis Review  [x] Walker Management/Safety [] Adaptive Equipment as Needed       Interdisciplinary Collaboration:   o Registered Nurse    After treatment position/precautions:   o Supine in bed  o Bed/Chair-wheels locked  o Bed in low position  o Call light within reach    Compliance with Program/Exercises: Compliant all of the time, Will assess as treatment progresses. Recommendations/Intent for next treatment session:  Treatment next visit will focus on increasing Ms. Raven Moe independence with bed mobility, transfers, gait training, strength/ROM exercises, modalities for pain, and patient education.       Total Treatment Duration:  PT Patient Time In/Time Out  Time In: 1357  Time Out: [address] , PT

## 2021-08-20 NOTE — DISCHARGE INSTRUCTIONS
Arielle Moore  Office Phone [phone]        Take Home Medications         · It is important that you take the medication exactly as they are prescribed. · Keep your medication in the bottles provided by the pharmacist and keep a list of the medication names, dosages, and times to be taken in your wallet. · Do not take other medications without consulting your doctor. What to do at [address] your prehospital diet. If you have excessive nausea or vomitting call your doctor's office     Home Physical Therapy is arranged. Use rolling walker when walking. Use Lorenzo Hose stockings until we see you in the office for your follow up appointment with Dr. Jer Moore. Patients who have had a joint replacement should not drive until you are seen for your follow up appointment by Dr. Jer Moore. When to Call    - Call if you have a temperature greater then 101  - Unable to keep food down  - Loose control of your bladder or bowel function  - Are unable to bear any weight   - Need a pain medication refill     Patient Education        Total Knee Replacement: What to Expect at [address] had a total knee replacement. The doctor replaced the worn ends of the bones that connect to your knee (thighbone and lower leg bone) with plastic and metal parts. When you leave the hospital, you should be able to move around with a walker or crutches. But you will need someone to help you at home for the next few weeks or until you have more energy and can move around better. You will go home with a bandage and stitches, staples, skin glue, or tape strips. Change the bandage as your doctor tells you to. If you have stitches or staples, your doctor will remove them 10 to 21 days after your surgery. Glue or tape strips will fall off on their own over time. You may still have some mild pain, and the area may be swollen for 3 to 6 months after surgery.   Your knee will continue to improve for 6 to 12 months. You will probably use a walker for 1 to 3 weeks and then use crutches. When you are ready, you can use a cane. You will probably be able to walk on your own in 4 to 8 weeks. You will need to do months of physical rehabilitation (rehab) after a knee replacement. Rehab will help you strengthen the muscles of the knee and help you regain movement. After you recover, your artificial knee will allow you to do normal daily activities with less pain or no pain at all. You may be able to hike, dance, ride a bike, and play golf. Talk to your doctor about whether you can do more strenuous activities. Always tell your caregivers that you have an artificial knee. How long it will take to walk on your own, return to normal activities, and go back to work depends on your health and how well your rehabilitation (rehab) program goes. The better you do with your rehab exercises, the quicker you will get your strength and movement back. This care sheet gives you a general idea about how long it will take for you to recover. But each person recovers at a different pace. Follow the steps below to get better as quickly as possible. How can you care for yourself at home? Activity    · Rest when you feel tired. You may take a nap, but don't stay in bed all day. When you sit, use a chair with arms. You can use the arms to help you stand up.     · Work with your physical therapist to find the best way to exercise. What you can do as your knee heals will depend on whether your new knee is cemented or uncemented. You may not be able to do certain things for a while if your new knee is uncemented.     · After your knee has healed enough, you can do more strenuous activities with caution. ? You can golf, but use a golf cart. And don't wear shoes with spikes. ? You can bike on a flat road or on a stationary bike. Avoid biking up hills.   ? Your doctor may suggest that you stay away from activities that put stress on your knee. These include tennis, badminton, contact sports like football, jumping (such as in basketball), jogging, and running. ? Avoid activities where you might fall.     · Do not sit for more than 1 hour at a time. Get up and walk around for a while before you sit again. If you must sit for a long time, prop up your leg with a chair or footstool. This will help you avoid swelling.     · Ask your doctor when you can drive again. It may take up to 8 weeks after knee replacement surgery before it's safe for you to drive.     · When you get into a car, sit on the edge of the seat. Then pull in your legs, and turn to face the front.     · You should be able to do many everyday activities 3 to 6 weeks after your surgery. You will probably need to take 4 to 16 weeks off from work. When you can go back to work depends on the type of work you do and how you feel.     · Ask your doctor when it is okay for you to have sex.     · For 12 weeks, do not lift anything heavier than 10 pounds and do not lift weights. Diet    · By the time you leave the hospital, you should be eating your normal diet. If your stomach is upset, try bland, low-fat foods like plain rice, broiled chicken, toast, and yogurt. Your doctor may suggest that you take iron and vitamin supplements.     · Drink plenty of fluids (unless your doctor tells you not to).   · Eat healthy foods, and watch your portion sizes. Try to stay at your ideal weight. Too much weight puts more stress on your new knee.     · You may notice that your bowel movements are not regular right after your surgery. This is common. Try to avoid constipation and straining with bowel movements. You may want to take a fiber supplement every day. If you have not had a bowel movement after a couple of days, ask your doctor about taking a mild laxative. Medicines    · Your doctor will tell you if and when you can restart your medicines.  You will also get instructions about taking any new medicines.     · If you take aspirin or some other blood thinner, ask your doctor if and when to start taking it again. Make sure that you understand exactly what your doctor wants you to do.     · Your doctor may give you a blood-thinning medicine to prevent blood clots. If you take a blood thinner, be sure you get instructions about how to take your medicine safely. Blood thinners can cause serious bleeding problems. This medicine could be in pill form or as a shot (injection). If a shot is needed, your doctor will tell you how to do this.     · Be safe with medicines. Take pain medicines exactly as directed. ? If the doctor gave you a prescription medicine for pain, take it as prescribed. ? If you are not taking a prescription pain medicine, ask your doctor if you can take an over-the-counter medicine. ? Plan to take your pain medicine 30 minutes before exercises. It is easier to prevent pain before it starts than to stop it after it has started.     · If you think your pain medicine is making you sick to your stomach:  ? Take your medicine after meals (unless your doctor has told you not to). ? Ask your doctor for a different pain medicine.     · If your doctor prescribed antibiotics, take them as directed. Do not stop taking them just because you feel better. You need to take the full course of antibiotics. Incision care    · If your doctor told you how to care for your cut (incision), follow your doctor's instructions. You will have a dressing over the cut. A dressing helps the incision heal and protects it. Your doctor will tell you how to take care of this.     · If you did not get instructions, follow this general advice:  ? If you have strips of tape on the cut the doctor made, leave the tape on for a week or until it falls off.  ? If you have stitches or staples, your doctor will tell you when to come back to have them removed.   ? If you have skin glue on the cut, leave it on until it falls off. Skin glue is also called skin adhesive or liquid stitches. ? Change the bandage every day. ? Wash the area daily with warm water, and pat it dry. Don't use hydrogen peroxide or alcohol. They can slow healing. ? You may cover the area with a gauze bandage if it oozes fluid or rubs against clothing. ? You may shower 24 to 48 hours after surgery. Pat the incision dry. Don't swim or take a bath for the first 2 weeks, or until your doctor tells you it is okay. Exercise    · Your rehab program will give you a number of exercises to do to help you get back your knee's range of motion and strength. Always do them as your therapist tells you. Ice    · For pain and swelling, put ice or a cold pack on the area for 10 to 20 minutes at a time. Put a thin cloth between the ice and your skin. Other instructions    · Keep wearing your compression stockings as your doctor says. These help to prevent blood clots. How long you'll have to wear them depends on your activity level and the amount of swelling.     · Carry a medical alert card that says you have an artificial joint. You have metal pieces in your knee. These may set off some airport metal detectors. Follow-up care is a key part of your treatment and safety. Be sure to make and go to all appointments, and call your doctor if you are having problems. It's also a good idea to know your test results and keep a list of the medicines you take. When should you call for help? Call 911 anytime you think you may need emergency care. For example, call if:    · You passed out (lost consciousness).     · You have severe trouble breathing.     · You have sudden chest pain and shortness of breath, or you cough up blood. Call your doctor now or seek immediate medical care if:    · You have signs of infection, such as:  ? Increased pain, swelling, warmth, or redness. ? Red streaks leading from the incision. ? Pus draining from the incision. ?  A fever.     · You have signs of a blood clot, such as:  ? Pain in your calf, back of the knee, thigh, or groin. ? Redness and swelling in your leg or groin.     · Your incision comes open and begins to bleed, or the bleeding increases.     · You have pain that does not get better after you take pain medicine. Watch closely for changes in your health, and be sure to contact your doctor if:    · You do not have a bowel movement after taking a laxative. Where can you learn more? Go to http://www.gray.com/  Enter T054 in the search box to learn more about \"Total Knee Replacement: What to Expect at Home. \"  Current as of: November 16, 2020               Content Version: 12.8  © 2006-2021 [employer]. Care instructions adapted under license by [employer] (which disclaims liability or warranty for this information). If you have questions about a medical condition or this instruction, always ask your healthcare professional. [place] [address] any warranty or liability for your use of this information. Information obtained by :  I understand that if any problems occur once I am at home I am to contact my physician. I understand and acknowledge receipt of the instructions indicated above.                                                                                                                                            Physician's or R.N.'s Signature                                                                  Date/Time                                                                                                                                              Patient or Representative Signature                                                          Date/Time

## 2021-08-20 NOTE — PROGRESS NOTES
Problem: Mobility Impaired (Adult and Pediatric)  Goal: *Acute Goals and Plan of Care (Insert Text)  Note: GOALS (1-4 days):  (1.)Ms. Pilar Lambert will move from supine to sit and sit to supine  in bed with SUPERVISION. (2.)Ms. Pilar Lambert will transfer from bed to chair and chair to bed with SUPERVISION using the least restrictive device. (3.)Ms. Pilar Lambert will ambulate with SUPERVISION for 250 feet with the least restrictive device. (4.)Ms. Pilar Lambert will ambulate up/down 4 steps with bilateral  railing with CONTACT GUARD ASSIST with no device. (5.)Ms. Pilar Lambert will increase right knee ROM to 5°-90°.  ________________________________________________________________________________________________      PHYSICAL THERAPY JOINT CAMP TKA: Daily Note, Treatment Day: 1st and AM 8/20/2021  INPATIENT: Hospital Day: 2  Payor: Shantella Dam / Plan: Yamila Han / Product Type: [employer] /      NAME/AGE/GENDER: Baljeet Sarmiento is a 76 y.o. female   PRIMARY DIAGNOSIS:  Right knee pain, unspecified chronicity [M25.561]  Arthritis of right knee [M17.11]   Procedure(s) and Anesthesia Type:     * RIGHT KNEE ARTHROPLASTY TOTAL SMITH/NEPHEW - Spinal (Right)  ICD-10: Treatment Diagnosis:    · Pain in Right Knee (M25.561)  · Stiffness of Right Knee, Not elsewhere classified (M25.661)  · Other abnormalities of gait and mobility (R26.89)      ASSESSMENT:     Ms. Pilar Lambert presents S/P R TKA and is experiencing a decline in her premorbid level of function. She would benefit from further PT while here to address these deficits: decreased R LE strength and ROM, standing balance, functional mobility and TKA awareness. She plans on going home at [place] with family support and  PT. This pm, she is still numb from her spinal. She is asking to use the restroom. She is limited with her distance due to diminished proprioception and safety. She returns to recliner and performs her R TKA exs.  We review these. She also performs toilet transfer with min assist.  8/20- pt up in chair on contact and agreeable to therapy. Worked on her TKA exercises in the chair with verbal cues and demonstration progressing with repetitions. Pt states she has a written HEP she was given before surgery. She wanted to get dressed, we then worked on gait training in the pierce with verbal cues for reciprocal gait pattern. Great progress with distance. Practiced going up and down stairs with verbal cues. She walked back to her room with continued cues but demonstrating good technique. She stayed up in chair with ice on knee and needs in reach. She is waiting to talk to the MD but she anticipates discharge tomorrow. This section established at most recent assessment   PROBLEM LIST (Impairments causing functional limitations):  1. Decreased Strength  2. Decreased ADL/Functional Activities  3. Decreased Transfer Abilities  4. Decreased Ambulation Ability/Technique  5. Decreased Balance  6. Decreased Activity Tolerance  7. Increased Fatigue  8. Decreased Flexibility/Joint Mobility  9. Decreased Knowledge of Precautions  10. Decreased [place] with Home Exercise Program   INTERVENTIONS PLANNED: (Benefits and precautions of physical therapy have been discussed with the patient.)  1. Bed Mobility  2. Cold  3. Gait Training  4. Home Exercise Program (HEP)  5. Range of Motion (ROM)  6. Therapeutic Activites  7. Therapeutic Exercise/Strengthening  8. Transfer Training  9. TKA education     TREATMENT PLAN: Frequency/Duration: Follow patient BID for duration of hospital stay to address above goals. Rehabilitation Potential For Stated Goals: Good     RECOMMENDED REHABILITATION/EQUIPMENT: (at time of discharge pending progress): Continue Skilled Therapy and Home Health: Physical Therapy.               HISTORY:   History of Present Injury/Illness (Reason for Referral):  S/P R TKA  Past Medical History/Comorbidities:   Ms. Patricia Hein  has a past medical history of Allergic rhinitis, Anxiety, Arthritis, Asthma, Celiac disease, COVID-19 vaccine series completed (3/30/21, 4/29/21), Depression, DVT (deep vein thrombosis) in pregnancy (2015), Dysthymic disorder, Esophageal reflux, Exophthalmos, Fibromyalgia, GERD (gastroesophageal reflux disease), Hiatal hernia, Hot flashes, Hypercholesterolemia, Hyperlipidemia, Hypertension, IBS (irritable bowel syndrome), SABIHA (iron deficiency anemia), Impaired fasting glucose, Liver disease, Lumbago, Malaise and fatigue, Morbid obesity ([address] Nonallopathic lesion of sacral region, KELLY (obstructive sleep apnea), Osteoarthritis, Osteoporosis, Osteoporosis, Other speech disturbances, PUD (peptic ulcer disease), Pulmonary embolism ([address] (2015), Pulmonary hypertension, mild ([address] RA (rheumatoid arthritis) ([address] RLS (restless legs syndrome), Sarcoidosis, Sciatic neuralgia, Tarsal tunnel syndrome, Urinary incontinence, Wears glasses, and Wears partial dentures. Ms. Paul Dhillon  has a past surgical history that includes hx hysterectomy (1978); hx orthopaedic; hx tonsillectomy; hx carpal tunnel release (Right, 1997); hx cholecystectomy; hx gastrectomy (04/17/2018); hx rotator cuff repair (Right, 2018); hx breast reduction; hx other surgical (05/14/2018); and hx other surgical (04/13/2018). Social History/Living Environment:   Home Environment: Private residence  # Steps to Enter: 1  One/Two Story Residence: Two story, live on 1st floor  # of Interior Steps: 17  Living Alone: No  Support Systems: Family member(s); Spouse/Significant Other/Partner  Patient Expects to be Discharged to[de-identified] House  Current DME Used/Available at Home: Cane, straight  Tub or Shower Type: Tub/Shower combination    Prior Level of Function/Work/Activity:  Functionally I   Number of Personal Factors/Comorbidities that affect the Plan of Care: 3+: HIGH COMPLEXITY   EXAMINATION:   Most Recent Physical Functioning:                                 Transfers  Sit to Stand: Stand-by assistance  Stand to Sit: Stand-by assistance    Balance  Sitting: Intact  Standing: Pull to stand; With support         Gait Training: Yes    Weight Bearing Status  Right Side Weight Bearing: As tolerated  Distance (ft): 600 Feet (ft)  Ambulation - Level of Assistance: Stand-by assistance  Assistive Device: Walker, rolling  Speed/Libby: Pace decreased (<100 feet/min)  Step Length: Left shortened  Stance: Right decreased  Gait Abnormalities: Antalgic  Number of Stairs Trained: 3 (up and down 3 times)  Stairs - Level of Assistance: Stand-by assistance  Rail Use: Both  Interventions: Safety awareness training;Verbal cues     Braces/Orthotics:     Right Knee Cold  Type: Cryocuff      Body Structures Involved:  1. Bones  2. Joints  3. Muscles Body Functions Affected:  1. Movement Related Activities and Participation Affected:  1. General Tasks and Demands  2. Mobility  3. Self Care   Number of elements that affect the Plan of Care: 4+: HIGH COMPLEXITY   CLINICAL PRESENTATION:   Presentation: Stable and uncomplicated: LOW COMPLEXITY   CLINICAL DECISION MAKIN [facility] [address] [id number] AM-PAC 6 Clicks   Basic Mobility Inpatient Short Form  How much difficulty does the patient currently have. .. Unable A Lot A Little None   1. Turning over in bed (including adjusting bedclothes, sheets and blankets)? [] 1   [] 2   [x] 3   [] 4   2. Sitting down on and standing up from a chair with arms ( e.g., wheelchair, bedside commode, etc.)   [] 1   [] 2   [x] 3   [] 4   3. Moving from lying on back to sitting on the side of the bed? [] 1   [] 2   [x] 3   [] 4   How much help from another person does the patient currently need. .. Total A Lot A Little None   4. Moving to and from a bed to a chair (including a wheelchair)? [] 1   [] 2   [x] 3   [] 4   5. Need to walk in hospital room? [] 1   [x] 2   [] 3   [] 4   6. Climbing 3-5 steps with a railing?    [x] 1   [] 2   [] 3   [] 4   © , Trustees of [facility], under license to [employer]. All rights reserved     Score:  Initial: 15 Most Recent: X (Date: -- )    Interpretation of Tool:  Represents activities that are increasingly more difficult (i.e. Bed mobility, Transfers, Gait). Medical Necessity:     · Patient demonstrates   · good  ·  rehab potential due to higher previous functional level. Reason for Services/Other Comments:  · Patient   · continues to require present interventions due to patient's inability to perform functional mobility at a safe supervision level  · . Use of outcome tool(s) and clinical judgement create a POC that gives a: Clear prediction of patient's progress: LOW COMPLEXITY            TREATMENT:   (In addition to Assessment/Re-Assessment sessions the following treatments were rendered)     Pre-treatment Symptoms/Complaints:  Itchy, RN notified and she got benadryl  Pain Initial:     no reports of pain Post Session: sore, ice on knee     Gait Training (23 Minutes):  Gait training to improve and/or restore physical functioning as related to mobility, strength, balance and coordination. Ambulated 600 Feet (ft) with Stand-by assistance using a Walker, rolling and minimal Safety awareness training;Verbal cues related to their stance phase and stride length to promote proper body alignment and promote proper body posture. Instruction in performance of walker use and sequencing to correct stance phase and stride length. Therapeutic Exercise: (15 Minutes):  Exercises per grid below to improve mobility and strength. Required minimal verbal cues to promote proper body alignment and promote proper body posture. Progressed repetitions as indicated.      Date:  8/19/21 Date:  8/20 Date:     ACTIVITY/EXERCISE AM PM AM PM AM PM   GROUP THERAPY  []  []  []  []  []  []   Ankle Pumps  10 15      Quad Sets  10 15      Gluteal Sets  10 15      Hip ABd/ADduction  10 15      Straight Leg Raises  10 15      Knee Slides  10AA 15      Short Arc Quads  10 15      Long Arc Quads         Chair Slides   15               B = bilateral; AA = active assistive; A = active; P = passive      Treatment/Session Assessment:     Response to Treatment:  Good progress    Education:  [x] Home Exercises  [x] Fall Precautions  [x] Use of Cold Therapy Unit [x] D/C Instruction Review  [] Knee Prosthesis Review  [x] Walker Management/Safety [] Adaptive Equipment as Needed       Interdisciplinary Collaboration:   o Registered Nurse    After treatment position/precautions:   o Up in chair  o Bed/Chair-wheels locked  o Call light within reach    Compliance with Program/Exercises: Compliant all of the time, Will assess as treatment progresses. Recommendations/Intent for next treatment session:  Treatment next visit will focus on increasing Ms. Venda Poplar independence with bed mobility, transfers, gait training, strength/ROM exercises, modalities for pain, and patient education.       Total Treatment Duration:  PT Patient Time In/Time Out  Time In: 0926  Time Out: Pravin [address] [place] [address]

## 2021-08-20 NOTE — PROGRESS NOTES
Problem: Falls - Risk of  Goal: *Absence of Falls  Description: Document Alexia Sibley Fall Risk and appropriate interventions in the flowsheet.   Outcome: Progressing Towards Goal  Note: Fall Risk Interventions:                                Problem: Patient Education: Go to Patient Education Activity  Goal: Patient/Family Education  Outcome: Progressing Towards Goal     Problem: Patient Education: Go to Patient Education Activity  Goal: Patient/Family Education  Outcome: Progressing Towards Goal     Problem: Patient Education: Go to Patient Education Activity  Goal: Patient/Family Education  Outcome: Progressing Towards Goal     Problem: Patient Education: Go to Patient Education Activity  Goal: Patient/Family Education  Outcome: Progressing Towards Goal     Problem: Patient Education: Go to Patient Education Activity  Goal: Patient/Family Education  Outcome: Progressing Towards Goal

## 2021-08-20 NOTE — PROGRESS NOTES
Problem: Self Care Deficits Care Plan (Adult)  Goal: *Acute Goals and Plan of Care (Insert Text)  Outcome: Progressing Towards Goal  Note: GOALS:   DISCHARGE GOALS (in preparation for going home/rehab):  3 days  1. Ms. Susan Atkinson will perform one lower body dressing activity with stand by assist required to demonstrate improved functional mobility and safety. 2.  Ms. Susan Atkinson will perform one lower body bathing activity with stand by assist required to demonstrate improved functional mobility and safety. 3.  Ms. Susan Atkinson will perform toileting/toilet transfer with stand by assist to demonstrate improved functional mobility and safety. 4.  Ms. Susan Atkinson will perform shower transfer with stand by assist to demonstrate improved functional mobility and safety. JOINT CAMP OCCUPATIONAL THERAPY TKA: Daily Note, Treatment Day: 1st and AM 8/20/2021  INPATIENT: Hospital Day: 2  Payor: [address] [place] [address],9D / Plan: [employer] / Product Type: Managed Care Medicare /      NAME/AGE/GENDER: Nakul Joaquin is a 76 y.o. female   PRIMARY DIAGNOSIS:  Right knee pain, unspecified chronicity [M25.561]  Arthritis of right knee [M17.11]   Procedure(s) and Anesthesia Type:     * RIGHT KNEE ARTHROPLASTY TOTAL SMITH/NEPHEW - Spinal (Right)  ICD-10: Treatment Diagnosis:    · Pain in Right Knee (M25.561)  · Stiffness of Right Knee, Not elsewhere classified (Y42.799)      ASSESSMENT:     Ms. Susan Atkinson is s/p Right TKA and presents with decreased weight bearing on R LE and decreased independence with functional mobility and activities of daily living as compared to baseline level of function and safety. Patient would benefit from skilled Occupational Therapy to maximize independence and safety with self-care task and functional mobility. Pt would also benefit from education on adaptive equipment and safety precautions in preparation for going home.         Patient able to don underwear at toilet with assist. Mobilized from bed to toilet to recliner using a rolling walker with assist. Not coordinated due to nerve block but still did well with extra verbal cues. Should progress well with ADL's tomorrow. 8/20 pt seen in room. Pt declined shower but has sponged off and dressed. Pt moves well and should do well with a shower. OT will see pt tomorrow. This section established at most recent assessment   PROBLEM LIST (Impairments causing functional limitations):  1. Decreased Strength  2. Decreased ADL/Functional Activities  3. Decreased Transfer Abilities  4. Increased Pain  5. Increased Fatigue  6. Decreased Flexibility/Joint Mobility  7. Decreased Knowledge of Precautions   INTERVENTIONS PLANNED: (Benefits and precautions of occupational therapy have been discussed with the patient.)  1. Activities of daily living training  2. Adaptive equipment training  3. Balance training  4. Clothing management  5. Donning&doffing training  6. Theraputic activity     TREATMENT PLAN: Frequency/Duration: Follow patient 1-2tx to address above goals. Rehabilitation Potential For Stated Goals: Good     RECOMMENDED REHABILITATION/EQUIPMENT: (at time of discharge pending progress): Continue Skilled Therapy. OCCUPATIONAL PROFILE AND HISTORY:   History of Present Injury/Illness (Reason for Referral): Pt presents this date s/p (Right) TKA.     Past Medical History/Comorbidities:   Ms. Joana Cavazos  has a past medical history of Allergic rhinitis, Anxiety, Arthritis, Asthma, Celiac disease, COVID-19 vaccine series completed (3/30/21, 4/29/21), Depression, DVT (deep vein thrombosis) in pregnancy (2015), Dysthymic disorder, Esophageal reflux, Exophthalmos, Fibromyalgia, GERD (gastroesophageal reflux disease), Hiatal hernia, Hot flashes, Hypercholesterolemia, Hyperlipidemia, Hypertension, IBS (irritable bowel syndrome), SABIHA (iron deficiency anemia), Impaired fasting glucose, Liver disease, Lumbago, Malaise and fatigue, Morbid obesity ([facility] Nonallopathic lesion of sacral region, KELLY (obstructive sleep apnea), Osteoarthritis, Osteoporosis, Osteoporosis, Other speech disturbances, PUD (peptic ulcer disease), Pulmonary embolism ([facility] (2015), Pulmonary hypertension, mild (HCC), RA (rheumatoid arthritis) ([facility] [address] RLS (restless legs syndrome), Sarcoidosis, Sciatic neuralgia, Tarsal tunnel syndrome, Urinary incontinence, Wears glasses, and Wears partial dentures. Ms. Genesis Dykes  has a past surgical history that includes hx hysterectomy (1978); hx orthopaedic; hx tonsillectomy; hx carpal tunnel release (Right, 1997); hx cholecystectomy; hx gastrectomy (04/17/2018); hx rotator cuff repair (Right, 2018); hx breast reduction; hx other surgical (05/14/2018); and hx other surgical (04/13/2018). Social History/Living Environment:   Home Environment: Private residence  # Steps to Enter: 1  One/Two Story Residence: Two story, live on 1st floor  # of Interior Steps: 17  Living Alone: No  Support Systems: Family member(s); Spouse/Significant Other/Partner  Patient Expects to be Discharged to[de-identified] House  Current DME Used/Available at Home: Cane, straight  Tub or Shower Type: Tub/Shower combination    Prior Level of Function/Work/Activity:  Independent prior. Number of Personal Factors/Comorbidities that affect the Plan of Care: Brief history (0):  LOW COMPLEXITY   ASSESSMENT OF OCCUPATIONAL PERFORMANCE[de-identified]   Most Recent Physical Functioning:                                  Mental Status  Neurologic State: Alert  Orientation Level: Oriented X4                Basic ADLs (From Assessment) Complex ADLs (From Assessment)   Basic ADL  Feeding: Independent  Oral Facial Hygiene/Grooming: Setup  Bathing: Minimum assistance  Upper Body Dressing: Setup  Lower Body Dressing: Moderate assistance  Toileting: Moderate assistance     Grooming/Bathing/Dressing Activities of Daily Living                                       Physical Skills Involved:  1.  Range of Motion  2. Balance  3. Strength  4. Activity Tolerance Cognitive Skills Affected (resulting in the inability to perform in a timely and safe manner):  1. [facility] Psychosocial Skills Affected:  1. WFL   Number of elements that affect the Plan of Care: 1-3:  LOW COMPLEXITY   CLINICAL DECISION MAKING:   [facility] AM-PAC 6 Clicks   Daily Activity Inpatient Short Form  How much help from another person does the patient currently need. .. Total A Lot A Little None   1. Putting on and taking off regular lower body clothing? [] 1   [] 2   [] 3   [] 4   2. Bathing (including washing, rinsing, drying)? [] 1   [] 2   [] 3   [] 4   3. Toileting, which includes using toilet, bedpan or urinal?   [] 1   [] 2   [] 3   [] 4   4. Putting on and taking off regular upper body clothing? [] 1   [] 2   [] 3   [] 4   5. Taking care of personal grooming such as brushing teeth? [] 1   [] 2   [] 3   [] 4   6. Eating meals? [] 1   [] 2   [] 3   [] 4   © 2007, Trustees of [facility], under license to [employer]. All rights reserved     Score:  Initial: 18 Most Recent: X (Date: -- )    Interpretation of Tool:  Represents activities that are increasingly more difficult (i.e. Bed mobility, Transfers, Gait). Medical Necessity:     · Skilled intervention continues to be required due to Deficits noted above. Reason for Services/Other Comments:  · Patient continues to require skilled intervention due to   · New TKA  · . Use of outcome tool(s) and clinical judgement create a POC that gives a: LOW COMPLEXITY            TREATMENT:   (In addition to Assessment/Re-Assessment sessions the following treatments were rendered)     Pre-treatment Symptoms/Complaints:    Pain: Initial:      Post Session:  0     Self Care: (25 min): Procedure(s) (per grid) utilized to improve and/or restore self-care/home management as related to functional mobility .  Required minimal verbal and   cueing to facilitate activities of daily living skills and compensatory activities. Treatment/Session Assessment:     Response to Treatment:  Good, sitting up in recliner. Education:  [] Home Exercises  [x] Fall Precautions  [] Hip Precautions [] Going Home Video  [x] Knee/Hip Prosthesis Review  [x] Walker Management/Safety [x] Adaptive Equipment as Needed       Interdisciplinary Collaboration:   o Physical Therapist  o Occupational Therapist  o Registered Nurse    After treatment position/precautions:   o Up in chair  o Bed/Chair-wheels locked  o Call light within reach  o RN notified     Compliance with Program/Exercises: Compliant all of the time, Will assess as treatment progresses. Recommendations/Intent for next treatment session:  Treatment next visit will focus on increasing Ms. Christian Annabella independence with bed mobility, transfers, self care, functional mobility, modalities for pain, and patient education.       Total Treatment Duration:  OT Patient Time In/Time Out  Time In: 1015  Time Out: [facility],

## 2021-08-20 NOTE — PROGRESS NOTES
August 20, 2021         Post Op day: 1 Day Post-Op Procedure(s) (LRB):  RIGHT KNEE ARTHROPLASTY TOTAL SMITH/NEPHEW (Right)      Admit Date: 8/19/2021  Admit Diagnosis: Right knee pain, unspecified chronicity [M25.561]  Arthritis of right knee [M17.11]       Principle Problem: <principal problem not specified>.            Subjective: Doing well, No complaints, No SOB, No Chest Pain, No Nausea or Vomiting     Objective:   Vital Signs are Stable, No Acute Distress, Alert,  Dressing is Dry,  Neurovascular exam is normal.     Assessment / Plan :  Patient Active Problem List   Diagnosis Code    Osteoporosis 733.0    Celiac disease K90.0    Hot flashes R23.2    RA (rheumatoid arthritis) ([facility] M06.9    SABIHA (iron deficiency anemia) D50.9    KELLY (obstructive sleep apnea) G47.33    Dysthymic disorder F34.1    Nonallopathic lesion of sacral region M99.9    Lumbago M54.5    Allergic rhinitis J30.9    Osteoarthritis M19.90    Malaise and fatigue R53.81, R53.83    Gastroesophageal reflux disease K21.9    Mixed hyperlipidemia E78.2    Pulmonary sarcoidosis (HCC) D86.0    RLS (restless legs syndrome) G25.81    Other speech disturbances R47.89    History of pulmonary embolism Z86.711    Impaired fasting glucose R73.01    Hypertension I10    IBS (irritable bowel syndrome) K58.9    Tarsal tunnel syndrome G57.50    Pulmonary hypertension, mild (HCC) I27.20    Recurrent major depressive disorder (HCC) F33.9    Generalized anxiety disorder F41.1    Liver cyst K76.89    Obesity, Class I, BMI 30.0-34.9 (see actual BMI) E66.9    Asthma J45.909    Closed fracture of greater tuberosity of right humerus S42.251A    Lesion of liver K76.9    Osteoarthritis of right acromioclavicular joint M19.011    Tear of right rotator cuff M75.101    Primary insomnia F51.01    Osteoporosis M81.0    Environmental allergies Z91.09    Arthritis, hip M16.10    S/P gastric bypass Z98.84    Hyperlipidemia E78.5    Chronic obstructive pulmonary disease (HCC) J44.9    Fibromyalgia M79.7    Medicare annual wellness visit, subsequent Z00.00    History of DVT (deep vein thrombosis) Z86.718    Acute seasonal allergic rhinitis due to pollen J30.1    ACP (advance care planning) Z71.89    BMI 30.0-30.9,adult Z68.30    Dietary counseling Z71.3    Acute right-sided low back pain with sciatica M54.40    History of pulmonary embolus (PE) Z86.711    Fall W19. XXXA    Contusion of left shoulder S40.012A    Cervicalgia M54.2    Radiculopathy affecting upper extremity M54.10    Left shoulder pain M25.512    Left elbow pain M25.522    Sinusitis J32.9    Preoperative clearance Z01.818    Noncompliance with CPAP treatment Z91.14    Screening mammogram, encounter for Z12.31    Arthritis of right knee M17.11      Patient Vitals for the past 8 hrs:   BP Temp Pulse Resp SpO2   21 0846     97 %   21 0703 120/63 97.8 °F (36.6 °C) 85 20 98 %    Temp (24hrs), Av °F (36.7 °C), Min:97.6 °F (36.4 °C), Max:98.5 °F (36.9 °C)    Body mass index is 32.27 kg/m².     Lab Results   Component Value Date/Time    HGB 10.9 (L) 2021 03:42 AM          S/P Procedure(s) (LRB):  RIGHT KNEE ARTHROPLASTY TOTAL SMITH/NEPHEW (Right)      Medical Mgmt per hospitalist  Anticoagulation plan: eliquis   Continue PT  Fall Precautions  DC disp: home sat  F/U: 2 weeks postop for wound check and staple removal        Signed By: VIVIANA Morales  2021,  12:23 PM

## 2021-08-20 NOTE — PROGRESS NOTES
08/19/21 2014   Oxygen Therapy   O2 Sat (%) 99 %   Pulse via Oximetry 96 beats per minute   O2 Device None (Room air)   Patient placed on continuous sat monitor. Monitor history deleted prior to placing on patient. Patient working on IS achieving 5131-9743 ml. Very good effort, technique. Enc pt to use Q1-Q2 hrs w/a.

## 2021-08-21 VITALS
SYSTOLIC BLOOD PRESSURE: 109 MMHG | TEMPERATURE: 98.5 F | WEIGHT: 193.9 LBS | RESPIRATION RATE: 16 BRPM | HEART RATE: 81 BPM | BODY MASS INDEX: 32.27 KG/M2 | OXYGEN SATURATION: 96 % | DIASTOLIC BLOOD PRESSURE: 68 MMHG

## 2021-08-21 LAB
ERYTHROCYTE [DISTWIDTH] IN BLOOD BY AUTOMATED COUNT: 14.7 % (ref 11.9–14.6)
HCT VFR BLD AUTO: 32.1 % (ref 35.8–46.3)
HGB BLD-MCNC: 10.5 G/DL (ref 11.7–15.4)
MCH RBC QN AUTO: 27.6 PG (ref 26.1–32.9)
MCHC RBC AUTO-ENTMCNC: 32.7 G/DL (ref 31.4–35)
MCV RBC AUTO: 84.3 FL (ref 79.6–97.8)
NRBC # BLD: 0 K/UL (ref 0–0.2)
PLATELET # BLD AUTO: 211 K/UL (ref 150–450)
PMV BLD AUTO: 10.5 FL (ref 9.4–12.3)
RBC # BLD AUTO: 3.81 M/UL (ref 4.05–5.2)
WBC # BLD AUTO: 10.8 K/UL (ref 4.3–11.1)

## 2021-08-21 PROCEDURE — 36415 COLL VENOUS BLD VENIPUNCTURE: CPT

## 2021-08-21 PROCEDURE — 74011250637 HC RX REV CODE- 250/637: Performed by: PHYSICIAN ASSISTANT

## 2021-08-21 PROCEDURE — 97110 THERAPEUTIC EXERCISES: CPT

## 2021-08-21 PROCEDURE — 97116 GAIT TRAINING THERAPY: CPT

## 2021-08-21 PROCEDURE — 97535 SELF CARE MNGMENT TRAINING: CPT

## 2021-08-21 PROCEDURE — 74011250637 HC RX REV CODE- 250/637: Performed by: ORTHOPAEDIC SURGERY

## 2021-08-21 PROCEDURE — 85027 COMPLETE CBC AUTOMATED: CPT

## 2021-08-21 PROCEDURE — 74011250636 HC RX REV CODE- 250/636: Performed by: ORTHOPAEDIC SURGERY

## 2021-08-21 RX ADMIN — DIPHENHYDRAMINE HYDROCHLORIDE 25 MG: 25 CAPSULE ORAL at 00:01

## 2021-08-21 RX ADMIN — OXYCODONE 10 MG: 5 TABLET ORAL at 05:39

## 2021-08-21 RX ADMIN — PANTOPRAZOLE SODIUM 40 MG: 40 TABLET, DELAYED RELEASE ORAL at 09:14

## 2021-08-21 RX ADMIN — Medication 10 ML: at 05:39

## 2021-08-21 RX ADMIN — OXYCODONE 10 MG: 5 TABLET ORAL at 09:19

## 2021-08-21 RX ADMIN — APIXABAN 2.5 MG: 2.5 TABLET, FILM COATED ORAL at 09:14

## 2021-08-21 RX ADMIN — CETIRIZINE HYDROCHLORIDE 10 MG: 10 TABLET, FILM COATED ORAL at 09:14

## 2021-08-21 RX ADMIN — ACETAMINOPHEN 1000 MG: 500 TABLET, FILM COATED ORAL at 05:38

## 2021-08-21 RX ADMIN — Medication 1 TABLET: at 09:14

## 2021-08-21 RX ADMIN — FLUTICASONE PROPIONATE 2 SPRAY: 50 SPRAY, METERED NASAL at 09:00

## 2021-08-21 RX ADMIN — DIPHENHYDRAMINE HYDROCHLORIDE 25 MG: 25 CAPSULE ORAL at 09:19

## 2021-08-21 RX ADMIN — VENLAFAXINE HYDROCHLORIDE 150 MG: 150 CAPSULE, EXTENDED RELEASE ORAL at 09:14

## 2021-08-21 RX ADMIN — DOCUSATE SODIUM 50MG AND SENNOSIDES 8.6MG 2 TABLET: 8.6; 5 TABLET, FILM COATED ORAL at 09:14

## 2021-08-21 RX ADMIN — HYDROMORPHONE HYDROCHLORIDE 1 MG: 1 INJECTION, SOLUTION INTRAMUSCULAR; INTRAVENOUS; SUBCUTANEOUS at 00:02

## 2021-08-21 RX ADMIN — ATORVASTATIN CALCIUM 80 MG: 40 TABLET, FILM COATED ORAL at 09:14

## 2021-08-21 NOTE — PROGRESS NOTES
2021         Post Op day: 2 Days Post-Op   Admit Diagnosis: Right knee pain, unspecified chronicity [M25.561]  Arthritis of right knee [M17.11]  LAB:    Recent Results (from the past 24 hour(s))   CBC W/O DIFF    Collection Time: 21  3:19 AM   Result Value Ref Range    WBC 10.8 4.3 - 11.1 K/uL    RBC 3.81 (L) 4.05 - 5.2 M/uL    HGB 10.5 (L) 11.7 - 15.4 g/dL    HCT 32.1 (L) 35.8 - 46.3 %    MCV 84.3 79.6 - 97.8 FL    MCH 27.6 26.1 - 32.9 PG    MCHC 32.7 31.4 - 35.0 g/dL    RDW 14.7 (H) 11.9 - 14.6 %    PLATELET 469 708 - 238 K/uL    MPV 10.5 9.4 - 12.3 FL    ABSOLUTE NRBC 0.00 0.0 - 0.2 K/uL     Vital Signs:    Patient Vitals for the past 8 hrs:   BP Temp Pulse Resp SpO2   21 0649 109/68 98.5 °F (36.9 °C) 81 16 96 %   21 0257 (!) 112/53 98.5 °F (36.9 °C) 72 15 93 %     Temp (24hrs), Av.5 °F (36.9 °C), Min:98.3 °F (36.8 °C), Max:98.6 °F (37 °C)    Pain Control:   Pain Assessment  Pain Scale 1: FLACC  Pain Intensity 1: 0  Pain Onset 1: years  Pain Location 1: Knee  Pain Orientation 1: Right  Pain Description 1: Aching  Pain Intervention(s) 1: Medication (see MAR)  Subjective: Doing well, pain is well controlled, no complaints     Objective:  No Acute Distress, Alert and Oriented, right knee dressing is C/D/I.   Neurovascular exam is normal       Assessment:   Patient Active Problem List   Diagnosis Code    Osteoporosis 733.0    Celiac disease K90.0    Hot flashes R23.2    RA (rheumatoid arthritis) ([facility] [address] M06.9    SABIHA (iron deficiency anemia) D50.9    KELLY (obstructive sleep apnea) G47.33    Dysthymic disorder F34.1    Nonallopathic lesion of sacral region M99.9    Lumbago M54.5    Allergic rhinitis J30.9    Osteoarthritis M19.90    Malaise and fatigue R53.81, R53.83    Gastroesophageal reflux disease K21.9    Mixed hyperlipidemia E78.2    Pulmonary sarcoidosis (HCC) D86.0    RLS (restless legs syndrome) G25.81    Other speech disturbances R47.89    History of pulmonary embolism Z86.711    Impaired fasting glucose R73.01    Hypertension I10    IBS (irritable bowel syndrome) K58.9    Tarsal tunnel syndrome G57.50    Pulmonary hypertension, mild (HCC) I27.20    Recurrent major depressive disorder (HCC) F33.9    Generalized anxiety disorder F41.1    Liver cyst K76.89    Obesity, Class I, BMI 30.0-34.9 (see actual BMI) E66.9    Asthma J45.909    Closed fracture of greater tuberosity of right humerus S42.251A    Lesion of liver K76.9    Osteoarthritis of right acromioclavicular joint M19.011    Tear of right rotator cuff M75.101    Primary insomnia F51.01    Osteoporosis M81.0    Environmental allergies Z91.09    Arthritis, hip M16.10    S/P gastric bypass Z98.84    Hyperlipidemia E78.5    Chronic obstructive pulmonary disease (HCC) J44.9    Fibromyalgia M79.7    Medicare annual wellness visit, subsequent Z00.00    History of DVT (deep vein thrombosis) Z86.718    Acute seasonal allergic rhinitis due to pollen J30.1    ACP (advance care planning) Z71.89    BMI 30.0-30.9,adult Z68.30    Dietary counseling Z71.3    Acute right-sided low back pain with sciatica M54.40    History of pulmonary embolus (PE) Z86.711    Fall W19. XXXA    Contusion of left shoulder S40.012A    Cervicalgia M54.2    Radiculopathy affecting upper extremity M54.10    Left shoulder pain M25.512    Left elbow pain M25.522    Sinusitis J32.9    Preoperative clearance Z01.818    Noncompliance with CPAP treatment Z91.14    Screening mammogram, encounter for Z12.31    Arthritis of right knee M17.11    Status post total knee replacement, right Z96.651       Status Post Procedure(s) (LRB):  RIGHT KNEE ARTHROPLASTY TOTAL SMITH/NEPHEW (Right)        Plan: Continue Physical Therapy, Monitor Hgb. ASA/SCDs for DVT prophylaxis. D/c home today.      Signed By: VIVIANA Vogel

## 2021-08-21 NOTE — PROGRESS NOTES
PIV established in R FA on 1st attempt with aseptic technique. Some site trauma observed during insertion though with return of blood in catheter and easy advance of catheter. IV flushed with 20cc of NS with no swelling, redness, or pain noted or reported. Primary RN Nusrat advised to include slight site trauma. Primary RN to flush line before using to ensure patency.

## 2021-08-21 NOTE — PROGRESS NOTES
Problem: Mobility Impaired (Adult and Pediatric)  Goal: *Acute Goals and Plan of Care (Insert Text)  Note: GOALS (1-4 days):  (1.)Ms. Gina Alan will move from supine to sit and sit to supine  in bed with SUPERVISION. (2.)Ms. Gina Alan will transfer from bed to chair and chair to bed with SUPERVISION using the least restrictive device. (3.)Ms. Gina Alan will ambulate with SUPERVISION for 250 feet with the least restrictive device. (4.)Ms. Gina Alan will ambulate up/down 4 steps with bilateral  railing with CONTACT GUARD ASSIST with no device. (5.)Ms. Gina Alan will increase right knee ROM to 5°-90°.  ________________________________________________________________________________________________      PHYSICAL THERAPY JOINT CAMP TKA: Daily Note, Discharge and AM 8/21/2021  OUTPATIENT: Hospital Day: 3  Payor: [address] [place] [address],9D / Plan: [employer] / Product Type: Managed Care Medicare /      NAME/AGE/GENDER: Lopez Baez is a 76 y.o. female   PRIMARY DIAGNOSIS:  Right knee pain, unspecified chronicity [M25.561]  Arthritis of right knee [M17.11]   Procedure(s) and Anesthesia Type:     * RIGHT KNEE ARTHROPLASTY TOTAL SMITH/NEPHEW - Spinal (Right)  ICD-10: Treatment Diagnosis:    · Pain in Right Knee (M25.561)  · Stiffness of Right Knee, Not elsewhere classified (M25.661)  · Other abnormalities of gait and mobility (R26.89)      ASSESSMENT:     Ms. Gina Alan presents S/P R TKA and is experiencing a decline in her premorbid level of function. She would benefit from further PT while here to address these deficits: decreased R LE strength and ROM, standing balance, functional mobility and TKA awareness. She plans on going home at [place] with family support and  PT. This pm, she is still numb from her spinal. She is asking to use the restroom. She is limited with her distance due to diminished proprioception and safety. She returns to recliner and performs her R TKA exs. We review these.  She also performs toilet transfer with min assist.  8/20- pt up in chair on contact and agreeable to therapy. Worked on her TKA exercises in the chair with verbal cues and demonstration progressing with repetitions. Pt states she has a written HEP she was given before surgery. She wanted to get dressed, we then worked on gait training in the pierce with verbal cues for reciprocal gait pattern. Great progress with distance. Practiced going up and down stairs with verbal cues. She walked back to her room with continued cues but demonstrating good technique. She stayed up in chair with ice on knee and needs in reach. She is waiting to talk to the MD but she anticipates discharge tomorrow. In pm pt supine on contact and agreeable to therapy. We worked on her TKA exercises in supine with verbal cues. Worked on bed mobility, sit to stand and gait training in the pierce with verbal cues. She returned to room and wanted to get back in the bed. Ice on knee and needs in reach. She plans to go home tomorrow with HHPT for follow up.   8/21 participated well. Sitting in the chair upon arrival.  Performs R TK exercises with guidance. Then walk 288 ft using RW with SBA while working on normal step length and stride. Rest few min then walk another 288 ft back to the room. Return to the chair with needs in reach and ice to R knee. Instructed to call for assistants. All question answer and ready for D/C. This section established at most recent assessment   PROBLEM LIST (Impairments causing functional limitations):  1. Decreased Strength  2. Decreased ADL/Functional Activities  3. Decreased Transfer Abilities  4. Decreased Ambulation Ability/Technique  5. Decreased Balance  6. Decreased Activity Tolerance  7. Increased Fatigue  8. Decreased Flexibility/Joint Mobility  9. Decreased Knowledge of Precautions  10.  Decreased [place] with Home Exercise Program   INTERVENTIONS PLANNED: (Benefits and precautions of physical therapy have been discussed with the patient.)  1. Bed Mobility  2. Cold  3. Gait Training  4. Home Exercise Program (HEP)  5. Range of Motion (ROM)  6. Therapeutic Activites  7. Therapeutic Exercise/Strengthening  8. Transfer Training  9. TKA education     TREATMENT PLAN: Frequency/Duration: Follow patient BID for duration of hospital stay to address above goals. Rehabilitation Potential For Stated Goals: Good     RECOMMENDED REHABILITATION/EQUIPMENT: (at time of discharge pending progress): Continue Skilled Therapy and Home Health: Physical Therapy. HISTORY:   History of Present Injury/Illness (Reason for Referral):  S/P R TKA  Past Medical History/Comorbidities:   Ms. Damion Hines  has a past medical history of Allergic rhinitis, Anxiety, Arthritis, Asthma, Celiac disease, COVID-19 vaccine series completed (3/30/21, 4/29/21), Depression, DVT (deep vein thrombosis) in pregnancy (2015), Dysthymic disorder, Esophageal reflux, Exophthalmos, Fibromyalgia, GERD (gastroesophageal reflux disease), Hiatal hernia, Hot flashes, Hypercholesterolemia, Hyperlipidemia, Hypertension, IBS (irritable bowel syndrome), SABIHA (iron deficiency anemia), Impaired fasting glucose, Liver disease, Lumbago, Malaise and fatigue, Morbid obesity ([address] Nonallopathic lesion of sacral region, KELLY (obstructive sleep apnea), Osteoarthritis, Osteoporosis, Osteoporosis, Other speech disturbances, PUD (peptic ulcer disease), Pulmonary embolism ([address] (2015), Pulmonary hypertension, mild ([address] RA (rheumatoid arthritis) ([address] RLS (restless legs syndrome), Sarcoidosis, Sciatic neuralgia, Tarsal tunnel syndrome, Urinary incontinence, Wears glasses, and Wears partial dentures.   Ms. Damion Hines  has a past surgical history that includes hx hysterectomy (1978); hx orthopaedic; hx tonsillectomy; hx carpal tunnel release (Right, 1997); hx cholecystectomy; hx gastrectomy (04/17/2018); hx rotator cuff repair (Right, 2018); hx breast reduction; hx other surgical (2018); and hx other surgical (2018). Social History/Living Environment:   Home Environment: Private residence  # Steps to Enter: 1  One/Two Story Residence: Two story, live on 1st floor  # of Interior Steps: 17  Living Alone: No  Support Systems: Family member(s); Spouse/Significant Other/Partner  Patient Expects to be Discharged to[de-identified] House  Current DME Used/Available at Home: Cane, straight  Tub or Shower Type: Tub/Shower combination    Prior Level of Function/Work/Activity:  Functionally I   Number of Personal Factors/Comorbidities that affect the Plan of Care: 3+: HIGH COMPLEXITY   EXAMINATION:   Most Recent Physical Functioning:                                 Transfers  Sit to Stand: Independent  Stand to Sit: Independent    Balance  Sitting: Intact  Standing: Intact              Weight Bearing Status  Right Side Weight Bearing: As tolerated  Distance (ft): 576 Feet (ft)  Ambulation - Level of Assistance: Stand-by assistance  Assistive Device: Walker, rolling  Base of Support: Center of gravity altered  Speed/Libby: Pace decreased (<100 feet/min)  Step Length: Right shortened  Gait Abnormalities: Decreased step clearance        Braces/Orthotics:     Right Knee Cold  Type: Cryocuff      Body Structures Involved:  1. Bones  2. Joints  3. Muscles Body Functions Affected:  1. Movement Related Activities and Participation Affected:  1. General Tasks and Demands  2. Mobility  3. Self Care   Number of elements that affect the Plan of Care: 4+: HIGH COMPLEXITY   CLINICAL PRESENTATION:   Presentation: Stable and uncomplicated: LOW COMPLEXITY   CLINICAL DECISION MAKIN hospitals [address] [id number] AM-PAC 6 Clicks   Basic Mobility Inpatient Short Form  How much difficulty does the patient currently have. .. Unable A Lot A Little None   1. Turning over in bed (including adjusting bedclothes, sheets and blankets)? [] 1   [] 2   [x] 3   [] 4   2.   Sitting down on and standing up from a chair with arms ( e.g., wheelchair, bedside commode, etc.)   [] 1   [] 2   [x] 3   [] 4   3. Moving from lying on back to sitting on the side of the bed? [] 1   [] 2   [x] 3   [] 4   How much help from another person does the patient currently need. .. Total A Lot A Little None   4. Moving to and from a bed to a chair (including a wheelchair)? [] 1   [] 2   [x] 3   [] 4   5. Need to walk in hospital room? [] 1   [x] 2   [] 3   [] 4   6. Climbing 3-5 steps with a railing? [x] 1   [] 2   [] 3   [] 4   © 2007, Trustees of [address] [place] [address] [address] [address], under license to [employer]. All rights reserved     Score:  Initial: 15 Most Recent: X (Date: -- )    Interpretation of Tool:  Represents activities that are increasingly more difficult (i.e. Bed mobility, Transfers, Gait). Medical Necessity:     · Patient demonstrates   · good  ·  rehab potential due to higher previous functional level. Reason for Services/Other Comments:  · Patient   · continues to require present interventions due to patient's inability to perform functional mobility at a safe supervision level  · . Use of outcome tool(s) and clinical judgement create a POC that gives a: Clear prediction of patient's progress: LOW COMPLEXITY            TREATMENT:   (In addition to Assessment/Re-Assessment sessions the following treatments were rendered)     Pre-treatment Symptoms/Complaints:  Doing good   Pain Initial:   Pain Intensity 1: 0 no reports of pain Post Session:     Gait Training (23 Minutes):  Gait training to improve and/or restore physical functioning as related to mobility, strength, balance and coordination. Ambulated 576 Feet (ft) with Stand-by assistance using a Walker, rolling and minimal   related to their stance phase and stride length to promote proper body alignment and promote proper body posture. Instruction in performance of walker use and sequencing to correct stance phase and stride length.   Therapeutic Exercise: (15 Minutes):  Exercises per grid below to improve mobility and strength. Required minimal verbal cues to promote proper body alignment and promote proper body posture. Progressed repetitions as indicated. Date:  8/19/21 Date:  8/20 Date:  8/21     ACTIVITY/EXERCISE AM PM AM PM AM PM   GROUP THERAPY  []  []  []  []  []  []   Ankle Pumps  10 15 15 20    Quad Sets  10 15 15 20    Gluteal Sets  10 15 15 20    Hip ABd/ADduction  10 15 15 20    Straight Leg Raises  10 15 15 20    Knee Slides  10AA 15 15 20    Short Arc Quads  10 15 15 20    Long Arc Quads         Chair Slides   15  20             B = bilateral; AA = active assistive; A = active; P = passive      Treatment/Session Assessment:     Response to Treatment:  Pt doing well ready for D/C    Education:  [x] Home Exercises  [x] Fall Precautions  [x] Use of Cold Therapy Unit [x] D/C Instruction Review  [] Knee Prosthesis Review  [x] Walker Management/Safety [] Adaptive Equipment as Needed       Interdisciplinary Collaboration:   o Physical Therapy Assistant  o Registered Nurse    After treatment position/precautions:   o Up in chair  o Bed/Chair-wheels locked  o Bed in low position  o Call light within reach    Compliance with Program/Exercises: Compliant all of the time, Will assess as treatment progresses. Recommendations/Intent for next treatment session:  Treatment next visit will focus on increasing Ms. Osvaldo Avilaical independence with bed mobility, transfers, gait training, strength/ROM exercises, modalities for pain, and patient education.       Total Treatment Duration:  PT Patient Time In/Time Out  Time In: 8613  Time Out: [address], PTA

## 2021-08-21 NOTE — PROGRESS NOTES
Care Management Interventions  PCP Verified by CM: Yes  Mode of Transport at Discharge: Self  Transition of Care Consult (CM Consult): [address]: Yes  Discharge Durable Medical Equipment: Yes [facility] and #-1 Bedside commode)  Physical Therapy Consult: Yes  Current Support Network: Lives Alone  Confirm Follow Up Transport: Family  Discharge Location  Discharge Placement: Home with home health    [facility] notified of discharge.     SYDNIE Lozoya  [place]   [phone]

## 2021-08-21 NOTE — PROGRESS NOTES
Problem: Self Care Deficits Care Plan (Adult)  Goal: *Acute Goals and Plan of Care (Insert Text)  Outcome: Progressing Towards Goal  Note: GOALS:   DISCHARGE GOALS (in preparation for going home/rehab):  3 days  1. Ms. Patricia Hein will perform one lower body dressing activity with stand by assist required to demonstrate improved functional mobility and safety. GOAL MET 8/21/2021  2. Ms. Patricia Hein will perform one lower body bathing activity with stand by assist required to demonstrate improved functional mobility and safety. GOAL MET 8/21/2021  3. Ms. Patricia Hein will perform toileting/toilet transfer with stand by assist to demonstrate improved functional mobility and safety. GOAL MET 8/21/2021  4. Ms. Patricia Hein will perform shower transfer with stand by assist to demonstrate improved functional mobility and safety. GOAL MET 8/21/2021       JOINT CAMP OCCUPATIONAL THERAPY TKA: Daily Note, Discharge, Treatment Day: 2nd and AM 8/21/2021  OUTPATIENT: Hospital Day: 3  Payor: [employer] Officer / Plan: [employer] / Product Type: Managed Care Medicare /      NAME/AGE/GENDER: Satya Flores is a 76 y.o. female   PRIMARY DIAGNOSIS:  Right knee pain, unspecified chronicity [M25.561]  Arthritis of right knee [M17.11]   Procedure(s) and Anesthesia Type:     * RIGHT KNEE ARTHROPLASTY TOTAL SMITH/NEPHEW - Spinal (Right)  ICD-10: Treatment Diagnosis:    · Pain in Right Knee (M25.561)  · Stiffness of Right Knee, Not elsewhere classified (P36.624)      ASSESSMENT:     Ms. Patricia Hein is s/p Right TKA and presents with decreased weight bearing on R LE and decreased independence with functional mobility and activities of daily living as compared to baseline level of function and safety. Patient would benefit from skilled Occupational Therapy to maximize independence and safety with self-care task and functional mobility.   Pt would also benefit from education on adaptive equipment and safety precautions in preparation for going home. Patient able to don underwear at toilet with assist. Mobilized from bed to toilet to recliner using a rolling walker with assist. Not coordinated due to nerve block but still did well with extra verbal cues. Should progress well with ADL's tomorrow. 8/20 pt seen in room. Pt declined shower but has sponged off and dressed. Pt moves well and should do well with a shower. OT will see pt tomorrow. 8/21/21  Ms. Susan Atkinson is s/p R TKA and presents with decreased weight bearing on R LE and decreased independence with functional mobility and activities of daily living. Patient completed shower and dressing as charted below in ADL grid and is ambulating with rolling walker with supervision. Patient has met 4/4 goals and plans to return home with good family support. Family able to provide patient with appropriate level of assistance at this time. OT reviewed safety precautions throughout session and therapy schedule for the remainder of today. Patient instructed to call for assistance when needing to get up from recliner and all needs in reach. Patient verbalized understanding of call light. This section established at most recent assessment   PROBLEM LIST (Impairments causing functional limitations):  1. Decreased Strength  2. Decreased ADL/Functional Activities  3. Decreased Transfer Abilities  4. Increased Pain  5. Increased Fatigue  6. Decreased Flexibility/Joint Mobility  7. Decreased Knowledge of Precautions   INTERVENTIONS PLANNED: (Benefits and precautions of occupational therapy have been discussed with the patient.)  1. Activities of daily living training  2. Adaptive equipment training  3. Balance training  4. Clothing management  5. Donning&doffing training  6. Theraputic activity     TREATMENT PLAN: Frequency/Duration: Follow patient 1-2tx to address above goals.   Rehabilitation Potential For Stated Goals: Good     RECOMMENDED REHABILITATION/EQUIPMENT: (at time of discharge pending progress): Continue Skilled Therapy. OCCUPATIONAL PROFILE AND HISTORY:   History of Present Injury/Illness (Reason for Referral): Pt presents this date s/p (Right) TKA. Past Medical History/Comorbidities:   Ms. Gina Alan  has a past medical history of Allergic rhinitis, Anxiety, Arthritis, Asthma, Celiac disease, COVID-19 vaccine series completed (3/30/21, 4/29/21), Depression, DVT (deep vein thrombosis) in pregnancy (2015), Dysthymic disorder, Esophageal reflux, Exophthalmos, Fibromyalgia, GERD (gastroesophageal reflux disease), Hiatal hernia, Hot flashes, Hypercholesterolemia, Hyperlipidemia, Hypertension, IBS (irritable bowel syndrome), SABIHA (iron deficiency anemia), Impaired fasting glucose, Liver disease, Lumbago, Malaise and fatigue, Morbid obesity ([address] Nonallopathic lesion of sacral region, KELLY (obstructive sleep apnea), Osteoarthritis, Osteoporosis, Osteoporosis, Other speech disturbances, PUD (peptic ulcer disease), Pulmonary embolism ([address] (2015), Pulmonary hypertension, mild ([address] RA (rheumatoid arthritis) ([address] RLS (restless legs syndrome), Sarcoidosis, Sciatic neuralgia, Tarsal tunnel syndrome, Urinary incontinence, Wears glasses, and Wears partial dentures. Ms. Gina Alan  has a past surgical history that includes hx hysterectomy (1978); hx orthopaedic; hx tonsillectomy; hx carpal tunnel release (Right, 1997); hx cholecystectomy; hx gastrectomy (04/17/2018); hx rotator cuff repair (Right, 2018); hx breast reduction; hx other surgical (05/14/2018); and hx other surgical (04/13/2018). Social History/Living Environment:   Home Environment: Private residence  # Steps to Enter: 1  One/Two Story Residence: Two story, live on 1st floor  # of Interior Steps: 17  Living Alone: No  Support Systems: Family member(s); Spouse/Significant Other/Partner  Patient Expects to be Discharged to[de-identified] House  Current DME Used/Available at Home: Cane, straight  Tub or Shower Type: Tub/Shower combination    Prior Level of Function/Work/Activity:  Independent prior. Number of Personal Factors/Comorbidities that affect the Plan of Care: Brief history (0):  LOW COMPLEXITY   ASSESSMENT OF OCCUPATIONAL PERFORMANCE[de-identified]   Most Recent Physical Functioning:   Balance  Sitting: Intact  Standing: Intact                              Mental Status  Neurologic State: Alert  Orientation Level: Oriented X4  Cognition: Appropriate decision making; Appropriate for age attention/concentration  Perception: Appears intact  Perseveration: No perseveration noted  Safety/Judgement: Fall prevention                Basic ADLs (From Assessment) Complex ADLs (From Assessment)   Basic ADL  Feeding: Independent  Oral Facial Hygiene/Grooming: Setup  Bathing: Minimum assistance  Type of Bath: Chlorhexidine (CHG), Full, Shower  Upper Body Dressing: Setup  Lower Body Dressing: Moderate assistance  Toileting: Moderate assistance     Grooming/Bathing/Dressing Activities of Daily Living   Grooming  Grooming Assistance: Independent  Brushing Teeth: Independent Cognitive Retraining  Safety/Judgement: Fall prevention   Upper Body Bathing  Bathing Assistance: Modified independent  Position Performed: Seated in chair  Adaptive Equipment: Shower chair     Lower Body Bathing  Bathing Assistance: Modified independent  Perineal  : Independent  Lower Body : Modified independent  Adaptive Equipment: Shower chair Toileting  Toileting Assistance: Independent  Bladder Hygiene: Independent   Upper Body Dressing Assistance  Dressing Assistance: Independent  Bra: Independent  Pullover Shirt: Independent Functional Transfers  Toilet Transfer : Modified independent  Shower Transfer: Modified independent   [address]: Independent  Slip on Shoes with Back: Independent Bed/Mat Mobility  Sit to Stand: Independent  Stand to Sit: Independent         Physical Skills Involved:  1.  Range of Motion  2. Balance  3. Strength  4. Activity Tolerance Cognitive Skills Affected (resulting in the inability to perform in a timely and safe manner):  1. [facility] Psychosocial Skills Affected:  1. WFL   Number of elements that affect the Plan of Care: 1-3:  LOW COMPLEXITY   CLINICAL DECISION MAKIN[address] [place] [address] AM-PAC 6 Clicks   Daily Activity Inpatient Short Form  How much help from another person does the patient currently need. .. Total A Lot A Little None   1. Putting on and taking off regular lower body clothing? [] 1   [] 2   [] 3   [] 4   2. Bathing (including washing, rinsing, drying)? [] 1   [] 2   [] 3   [] 4   3. Toileting, which includes using toilet, bedpan or urinal?   [] 1   [] 2   [] 3   [] 4   4. Putting on and taking off regular upper body clothing? [] 1   [] 2   [] 3   [] 4   5. Taking care of personal grooming such as brushing teeth? [] 1   [] 2   [] 3   [] 4   6. Eating meals? [] 1   [] 2   [] 3   [] 4   © , Trustees of [address] [place] [address], under license to [employer]. All rights reserved     Score:  Initial: 18 Most Recent: X (Date: -- )    Interpretation of Tool:  Represents activities that are increasingly more difficult (i.e. Bed mobility, Transfers, Gait). Use of outcome tool(s) and clinical judgement create a POC that gives a: LOW COMPLEXITY            TREATMENT:   (In addition to Assessment/Re-Assessment sessions the following treatments were rendered)     Pre-treatment Symptoms/Complaints:    Pain: Initial:   Pain Intensity 1: 0  Post Session:  0     Self Care: (25 min): Procedure(s) (per grid) utilized to improve and/or restore self-care/home management as related to dressing, bathing, toileting, grooming and functional mobility . Required no cuing to facilitate activities of daily living skills and compensatory activities. Treatment/Session Assessment:     Response to Treatment:  Good, sitting up in recliner.     Education:  [] Home Exercises  [x] Fall Precautions  [] Hip Precautions [] Going Home Video  [x] Knee/Hip Prosthesis Review  [x] Walker Management/Safety [x] Adaptive Equipment as Needed       Interdisciplinary Collaboration:   o Physical Therapist  o Certified Occupational Therapy Assistant  o Registered Nurse    After treatment position/precautions:   o Up in chair  o Bed/Chair-wheels locked  o Call light within reach  o RN notified     Compliance with Program/Exercises: Compliant all of the time, Will assess as treatment progresses. Pt doing well all goals met and will do well at home with support from family. Patient will be discharged home with home health PT. No further Occupational Therapy warranted, will discharge Occupational Therapy services.       Total Treatment Duration:  OT Patient Time In/Time Out  Time In: 2423  Time Out: 0800     CHET Nesbitt

## 2021-08-21 NOTE — PROGRESS NOTES
Medicated with Benadryl 25 mg PO as requested for sleep and Dilaudid 1 mg IV as requested for pain, see MAR.

## 2021-08-23 ENCOUNTER — HOME CARE VISIT (OUTPATIENT)
Dept: SCHEDULING | Facility: HOME HEALTH | Age: 66
End: 2021-08-23
Payer: MEDICARE

## 2021-08-23 VITALS
DIASTOLIC BLOOD PRESSURE: 60 MMHG | RESPIRATION RATE: 16 BRPM | HEART RATE: 92 BPM | SYSTOLIC BLOOD PRESSURE: 108 MMHG | TEMPERATURE: 97.8 F | OXYGEN SATURATION: 95 %

## 2021-08-23 PROCEDURE — 400013 HH SOC

## 2021-08-23 PROCEDURE — G0151 HHCP-SERV OF PT,EA 15 MIN: HCPCS

## 2021-08-25 ENCOUNTER — HOME CARE VISIT (OUTPATIENT)
Dept: SCHEDULING | Facility: HOME HEALTH | Age: 66
End: 2021-08-25
Payer: MEDICARE

## 2021-08-25 PROCEDURE — G0151 HHCP-SERV OF PT,EA 15 MIN: HCPCS

## 2021-08-26 VITALS
TEMPERATURE: 98.3 F | SYSTOLIC BLOOD PRESSURE: 106 MMHG | RESPIRATION RATE: 18 BRPM | OXYGEN SATURATION: 100 % | HEART RATE: 96 BPM | DIASTOLIC BLOOD PRESSURE: 64 MMHG

## 2021-08-27 ENCOUNTER — HOME CARE VISIT (OUTPATIENT)
Dept: SCHEDULING | Facility: HOME HEALTH | Age: 66
End: 2021-08-27
Payer: MEDICARE

## 2021-08-27 PROCEDURE — G0151 HHCP-SERV OF PT,EA 15 MIN: HCPCS

## 2021-08-29 VITALS
RESPIRATION RATE: 18 BRPM | DIASTOLIC BLOOD PRESSURE: 78 MMHG | SYSTOLIC BLOOD PRESSURE: 116 MMHG | HEART RATE: 78 BPM | TEMPERATURE: 98.9 F

## 2021-08-30 ENCOUNTER — HOME CARE VISIT (OUTPATIENT)
Dept: SCHEDULING | Facility: HOME HEALTH | Age: 66
End: 2021-08-30
Payer: MEDICARE

## 2021-08-30 VITALS
TEMPERATURE: 97.3 F | DIASTOLIC BLOOD PRESSURE: 68 MMHG | HEART RATE: 97 BPM | SYSTOLIC BLOOD PRESSURE: 126 MMHG | RESPIRATION RATE: 18 BRPM

## 2021-08-30 PROCEDURE — G0157 HHC PT ASSISTANT EA 15: HCPCS

## 2021-09-01 ENCOUNTER — HOME CARE VISIT (OUTPATIENT)
Dept: SCHEDULING | Facility: HOME HEALTH | Age: 66
End: 2021-09-01
Payer: MEDICARE

## 2021-09-01 VITALS
HEART RATE: 83 BPM | SYSTOLIC BLOOD PRESSURE: 126 MMHG | DIASTOLIC BLOOD PRESSURE: 66 MMHG | TEMPERATURE: 97.4 F | RESPIRATION RATE: 18 BRPM

## 2021-09-01 PROCEDURE — G0157 HHC PT ASSISTANT EA 15: HCPCS

## 2021-09-03 ENCOUNTER — HOME CARE VISIT (OUTPATIENT)
Dept: SCHEDULING | Facility: HOME HEALTH | Age: 66
End: 2021-09-03
Payer: MEDICARE

## 2021-09-03 VITALS
RESPIRATION RATE: 19 BRPM | DIASTOLIC BLOOD PRESSURE: 64 MMHG | TEMPERATURE: 97.3 F | HEART RATE: 79 BPM | SYSTOLIC BLOOD PRESSURE: 124 MMHG

## 2021-09-03 PROCEDURE — G0157 HHC PT ASSISTANT EA 15: HCPCS

## 2021-09-07 ENCOUNTER — HOME CARE VISIT (OUTPATIENT)
Dept: SCHEDULING | Facility: HOME HEALTH | Age: 66
End: 2021-09-07
Payer: MEDICARE

## 2021-09-07 VITALS
SYSTOLIC BLOOD PRESSURE: 128 MMHG | RESPIRATION RATE: 18 BRPM | TEMPERATURE: 97.3 F | DIASTOLIC BLOOD PRESSURE: 66 MMHG | HEART RATE: 88 BPM

## 2021-09-07 PROBLEM — Z12.31 SCREENING MAMMOGRAM, ENCOUNTER FOR: Status: RESOLVED | Noted: 2021-08-08 | Resolved: 2021-09-07

## 2021-09-07 PROCEDURE — G0157 HHC PT ASSISTANT EA 15: HCPCS

## 2021-09-09 ENCOUNTER — HOME CARE VISIT (OUTPATIENT)
Dept: SCHEDULING | Facility: HOME HEALTH | Age: 66
End: 2021-09-09
Payer: MEDICARE

## 2021-09-09 PROCEDURE — G0151 HHCP-SERV OF PT,EA 15 MIN: HCPCS

## 2021-09-12 VITALS
HEART RATE: 88 BPM | OXYGEN SATURATION: 98 % | TEMPERATURE: 98.1 F | RESPIRATION RATE: 18 BRPM | DIASTOLIC BLOOD PRESSURE: 76 MMHG | SYSTOLIC BLOOD PRESSURE: 122 MMHG

## 2021-09-13 ENCOUNTER — HOSPITAL ENCOUNTER (OUTPATIENT)
Dept: PHYSICAL THERAPY | Age: 66
Discharge: HOME | End: 2021-09-13
Payer: MEDICARE

## 2021-09-13 PROCEDURE — 97110 THERAPEUTIC EXERCISES: CPT

## 2021-09-13 PROCEDURE — 97161 PT EVAL LOW COMPLEX 20 MIN: CPT

## 2021-09-13 NOTE — THERAPY EVALUATION
Madyson Lopez  : 1955  [facility]  Secondary: [place] Of [facility]  [address]. [address], [address]  [phone]   [phone]         OUTPATIENT PHYSICAL THERAPY:Initial Assessment 2021    Treatment Diagnosis: Pain in right knee (M25.561)  Stiffness of right knee, not elsewhere classified (M25.661)  Presence of right artificial knee joint (Z14.821)    Precautions/Allergies:   Codeine   MD Orders: Eval and Treat  MEDICAL/REFERRING DIAGNOSIS:  Presence of right artificial knee joint [Z96.651]   DATE OF ONSET: 21  REFERRING PHYSICIAN: Tamia Jimenez MD  RETURN PHYSICIAN APPOINTMENT: 10/1/21     INITIAL ASSESSMENT:  Ms. Arsenio Rubin presents to physical therapy with decreased strength, ROM, joint mobility, functional mobility associated with post op status of R TKA. Pt is currently 10 to 120 dg on right knee and reports with HHPT in the afternoon she was measuring 3 to 127dg. Pt is currently walking with a SPC, but has begun to ambulate without AD occasionally in the house. Pt reports decreased stamina, increased stiffness, increased discomfort with prolonged standing and walking, and moderate sleep issues. Patient will benefit from skilled physical therapy for manual therapeutic techniques (as appropriate), therapeutic exercises and activities, balance and comprehensive home exercises program to address current impairments and functional limitations. Madyson Lopez will benefit from skilled PT (medically necessary) in order to address above deficits affecting participation in basic ADLs and overall functional tolerance. PROBLEM LIST (Impacting functional limitations):   1. Decreased Strength  2. Decreased ADL/Functional Activities  3. Decreased Transfer Abilities  4. Decreased Ambulation Ability/Technique  5. Decreased Balance  6. Increased Pain  7.  Decreased Joint mobility/Flexibility   8. Decreased Activity Tolerance  9. Decreased [place] with Home Exercise Program INTERVENTIONS PLANNED:   1. Balance Exercise  2. Bed Mobility  3. Cold  4. Cryotherapy  5. Family Education  6. Gait Training  7. Heat  8. Home Exercise Program (HEP)  9. Manual Therapy  10. Neuromuscular Re-education/Strengthening  11. Range of Motion (ROM)  12. Therapeutic Activites  13. Therapeutic Exercise/Strengthening  14. Transfer Training  15. Ultrasound (US)  16. Vasopneumatic Compression          TREATMENT PLAN:  Effective Dates: 9/13/2021 TO 11/12/2021 (60 days). Frequency/Duration: 2 times a week for 60 Days    GOALS: (Goals have been discussed and agreed upon with patient.)   Short-Term Goals: 30 days  Goal Met   1. Satya Flores will be independent with HEP to maintain functional gains made with therapy intervention. 1.  [] Date:   2. Satya Flores will be able to stand x 15 min order to prep meals at home. 2.  [] Date:   3. Satya Flores will be able to complete 6 minute walk test without AD at >=1300 ft to return to walking with dog. 3.  [] Date:   4. Satya Flores will be able to perform 11 sit to stand transfers in 30 seconds from standard height chair without UE support in order to improve strength and community access at restaurants 4. [] Date:   5. Satya Flores will demonstrate Right knee extension to <= 5 degrees to improve functional mobility and gait mechanics 5. [] Date:   6. Satya Flores will demonstrate  right knee flexion >= 125 degrees to improve functional mobility, stooping, and squatting  6. [] Date:    Long Term Goals: 60 days Goal Met   1. Satya Flores to increase lower extremity functional scale by 20 points to show improvement in household and community mobility. 1.  [] Date:   2.  Satya Flores will be able to carry 20 lbs in bilateral UE in order to complete household chores, community participation, and work needs. 2.  [] Date:   3. Jesse Treadwell will be able to lift 45 lb from floor in order to complete household chores and improve overall strength 3.  [] Date:   4. Jesse Treadwell will be able to walk x 12 min on hill function of treadmill in order to return to wellness program. 4.  [] Date:       Outcome Measure: Tool Used: Lower Extremity Functional Scale (LEFS)  Score:  Initial: 48/80 Most Recent: X/80 (Date: -- )   Interpretation of Score: 20 questions each scored on a 5 point scale with 0 representing \"extreme difficulty or unable to perform\" and 4 representing \"no difficulty\". The lower the score, the greater the functional disability. 80/80 represents no disability. Minimal detectable change is 9 points. Tool Used: Gait Speed   Score:  Initial Self Selected Walking Speed:  3.28 ft/sec Most Recent: X ft/sec (Date: -- )    Initial Max Walking Speed: 5.46 ft/sec Most Recent: X ft/sec (Date: -- )   Interpretation of Score: Walking speed is used for assessing and monitoring functional status and over all health on an individual. The individual walks for 5 meters/16.4 ft with the therapist timing. The individual has an acceleration phase of 3 meters, or 9.8ft, prior to timing is initiated. A community ambulator walks at 2.62 ft/sec to 4.32 ft/sec. The [facility] for a community dwelling older adult is a change of 0.45 ft/sec at a self selected pace. A MDC is not yet established for a community dwelling older adult for max walking speed. Tool Used: 30 sec Sit to Stand  Score:  Initial: 7 reps Most Recent: X reps (Date: -- )       Medical Necessity:   · Skilled intervention continues to be required due to current impairment. Reason for Services/Other Comments:  · Patient continues to require skilled intervention due to patient continues to present with impairments assessed at initial evaluation and requiring skilled physical therapy to meet goals for PT.     Rehabilitation Potential For Stated Goals: Excellent    Total Treatment Duration: 30 min plus treatment  PT Patient Time In/Time Out  Time In: 1005  Time Out: 1055    Regarding Angelica Aviles's therapy, I certify that the treatment plan above will be carried out by a therapist or under their direction.   Thank you for this referral,  Giacomo West, PT     Referring Physician Signature: Adriana Kovacs MD          HISTORY:   History of Injury/Illness (Reason for Referral): pt had chronic knee pain that failed conservative managment    · Aggravating factors: prolonged rest, standing, walking   · Relieving factors: ice, movement,     Dominant Side:         RIGHT    Pain Scale on day of evaluation:  · Current: 7/10  · Best: 3-4/10  · Worst: 9/10    Prior Level of Function/Work/Activity:  Prior level of function: walking program, no AD, involved with family, play with dog  Current level of function: decreased stamina, endurance, difficulty sleeping, using SPC,   Work/hobbies: pt reportst she walked several miles everyday prior to surgery, spending time with family and travel to [place]    Other Clinical Tests:          Imaging: YES radiograph    Previous Treatment Approaches:          HHPT until 9/10/21  Social History/Living Environment:   Pt lives in a two level home with spouse     Past Medical History/Comorbidities:   Ms. Komal Navarrete  has a past medical history of Allergic rhinitis, Anxiety, Arthritis, Asthma, Celiac disease, COVID-19 vaccine series completed (3/30/21, 4/29/21), Depression, DVT (deep vein thrombosis) in pregnancy (2015), Dysthymic disorder, Esophageal reflux, Exophthalmos, Fibromyalgia, GERD (gastroesophageal reflux disease), Hiatal hernia, Hot flashes, Hypercholesterolemia, Hyperlipidemia, Hypertension, IBS (irritable bowel syndrome), SABIHA (iron deficiency anemia), Impaired fasting glucose, Liver disease, Lumbago, Malaise and fatigue, Morbid obesity ([address] Nonallopathic lesion of sacral region, KELLY (obstructive sleep apnea), Osteoarthritis, Osteoporosis, Osteoporosis, Other speech disturbances, PUD (peptic ulcer disease), Pulmonary embolism ([facility] (2015), Pulmonary hypertension, mild (HCC), RA (rheumatoid arthritis) ([facility] [address] [facility] RLS (restless legs syndrome), Sarcoidosis, Sciatic neuralgia, Tarsal tunnel syndrome, Urinary incontinence, Wears glasses, and Wears partial dentures. Ms. Jf Keith  has a past surgical history that includes hx hysterectomy (1978); hx orthopaedic; hx tonsillectomy; hx carpal tunnel release (Right, 1997); hx cholecystectomy; hx gastrectomy (04/17/2018); hx rotator cuff repair (Right, 2018); hx breast reduction; hx other surgical (05/14/2018); and hx other surgical (04/13/2018). Current Medications:    Current Outpatient Medications:     oxyCODONE IR (ROXICODONE) 5 mg immediate release tablet, Take 1 Tablet by mouth every four (4) hours as needed for Pain for up to 7 days. Max Daily Amount: 30 mg., Disp: 40 Tablet, Rfl: 0    acetaminophen (TYLENOL) 500 mg tablet, Take 1,000 mg by mouth every six (6) hours as needed for Pain., Disp: , Rfl:     polyethylene glycol (MIRALAX) 17 gram packet, Take 17 g by mouth daily. mix with 6 oz of fluids, Disp: , Rfl:     apixaban (ELIQUIS) 2.5 mg tablet, Take 1 Tablet by mouth every twelve (12) hours for 35 days. , Disp: 70 Tablet, Rfl: 0    ALPRAZolam (XANAX) 0.25 mg tablet, Take 1 Tablet by mouth two (2) times daily as needed for Anxiety. Max Daily Amount: 0.5 mg., Disp: 60 Tablet, Rfl: 5    zolpidem (Ambien) 10 mg tablet, Take 1 Tablet by mouth nightly as needed for Sleep. Max Daily Amount: 10 mg., Disp: 30 Tablet, Rfl: 5    venlafaxine-SR (EFFEXOR-XR) 150 mg capsule, Take 1 Capsule by mouth daily. , Disp: 30 Capsule, Rfl: 5    docosahexaenoic acid/epa (FISH OIL PO), Take 1 Tablet by mouth two (2) times a day., Disp: , Rfl:     atorvastatin (LIPITOR) 80 mg tablet, Take 1 Tablet by mouth daily.  Indications: excessive fat in the blood, Disp: 90 Tablet, Rfl: 1    fluticasone propionate (FLONASE) 50 mcg/actuation nasal spray, 2 Sprays by Both Nostrils route daily. , Disp: 1 Bottle, Rfl: 5    pantoprazole (PROTONIX) 40 mg tablet, Take 1 Tablet by mouth daily. , Disp: 90 Tablet, Rfl: 1    loratadine (CLARITIN) 10 mg tablet, Take 1 Tab by mouth daily as needed for Allergies. , Disp: 90 Tab, Rfl: 1    cholecalciferol (VITAMIN D3) 1,000 unit cap, Vitamin D3 1,000 unit capsule  Take by oral route., Disp: , Rfl:     ASCORBATE CALCIUM (VITAMIN C PO), Take 1 Tablet by mouth daily. , Disp: , Rfl:     BIOTIN PO, Take 1 Tablet by mouth daily. , Disp: , Rfl:     calcium-cholecalciferol, d3, 600 mg calcium- 200 unit cap, Take 2 Tabs by mouth daily. Indications: HYPOCALCEMIA PREVENTION, PREVENTION OF VITAMIN D DEFICIENCY, Disp: , Rfl:     multivitamin (ONE A DAY) tablet, Take 1 Tab by mouth daily. , Disp: , Rfl:       Ambulatory/Rehab Services H2 Model Falls Risk Assessment    Risk Factors:       No Risk Factors Identified Ability to Rise from Chair:       (1)  Pushes up, successful in one attempt    Falls Prevention Plan:       No modifications necessary   Total: (5 or greater = High Risk): 1    ©2010 [facility] [employer]. All Rights Reserved. [facility] Patent #[id number].  Federal Law prohibits the replication, distribution or use without written permission from [facility] [employer]         Date Last Reviewed:  9/13/2021   Number of Personal Factors/Comorbidities that affect the Plan of Care: 0: LOW COMPLEXITY   EXAMINATION:   Observation/Orthostatic Postural Assessment:   Gait:  Antalgic, SPC    Palpation:  Assessed @ Initial Visit: incision closed, healing well, no drainage, heat, redness noted    Functional Mobility: Limited tolerance of walking and standing  Sit to Stand= decreased use of right LE, uses B UE  Balance:    Single leg stance: R= 5 / L=10  30 second STS= 7 reps    AROM/PROM         Joint: Eval Date: 9/13/21  Re-Assess Date:  Re-Assess Date:    Active ROM RIGHT LEFT RIGHT LEFT RIGHT LEFT   Knee Extension 10 0       Knee Flexion 120 (seated) 130                  Strength:     Eval Date: 9/13/21  Re-Assess Date:  Re-Assess Date:      RIGHT LEFT RIGHT LEFT RIGHT LEFT   Knee Flexion 4-/5 4-/5       Knee Extension 3+/5 4-/5       Hip Flexion 4-/5 4-/5       Hip Abduction 4-/5 4-/5       Hip Extension 4-/5 4-/5       Hip External Rotation 4-/5 4-/5       Dorsiflexion 4/5 4/5           Neurological Screen:  No radiating symptoms down leg     Body Structures Involved:  1. Bones  2. Joints  3. Muscles  4. Ligaments Body Functions Affected:  1. Sensory/Pain  2. Neuromusculoskeletal  3. Movement Related Activities and Participation Affected:  1. Mobility  2. Self Care   Number of elements that affect the Plan of Care: 1-2: LOW COMPLEXITY   CLINICAL PRESENTATION:   Presentation: Stable and uncomplicated: LOW COMPLEXITY   CLINICAL DECISION MAKING:      Use of outcome tool(s) and clinical judgement create a POC that gives a: Clear prediction of patient's progress: LOW COMPLEXITY   See treatment note for associated treatment provided today.

## 2021-09-13 NOTE — PROGRESS NOTES
Madyson Lopez  : 1955  Payor: Ger Foss / Plan: [place] / Product Type: Commerical /  Terricorey Arndtner at [address] [facility], [address], [facility], [address] [place] [address]  [phone]   [phone]      OUTPATIENT PHYSICAL THERAPY: Daily Treatment Note 2021 Visit Count:  1    Treatment Diagnosis: Pain in right knee (M25.561)  Stiffness of right knee, not elsewhere classified (M25.661)  Presence of right artificial knee joint (W63.454)    Precautions/Allergies:   Codeine   MD Orders: Eval and Treat  MEDICAL/REFERRING DIAGNOSIS:  Presence of right artificial knee joint [Z96.651]   DATE OF ONSET: 21  REFERRING PHYSICIAN: Tamia Jimenez MD  RETURN PHYSICIAN APPOINTMENT: 10/1/21         Pre-treatment Symptoms/Complaints: See Initial Eval Dated 21 for more details. Pain: Initial:7/10  Medications Last Reviewed:  2021     Post Session: 4/10   Updated Objective Findings: See Initial Eval for more details. TREATMENT:   THERAPEUTIC EXERCISE: (30 minutes):  Exercises per grid below to improve mobility, strength and balance. Required minimal visual, verbal and manual cues to promote proper body alignment and promote proper body posture. Progressed resistance and complexity of movement as indicated. Date:  2021 Date:   Date:     Activity/Exercise Parameters Parameters Parameters   Education HEP, POC, PT goals, anatomy/pathology, importance of walking, education on normal healing process and expectations     Sit to stand 10x     SAQ Laser 5 min                                   THERAPEUTIC ACTIVITY: ( 0 minutes): Activities per gid below to improve functional movement related mobility, strength and balance to improve neuro-muscular carryover to daily functional activities for improving patient's quality of life.  Required visual, verbal and manual cues to promote proper body alignment and promote proper body posture/mechanics. Progressed resistance and complexity of movement as indicated. Date:  9/13/2021 Date:   Date:     Activity/Exercise Parameters Parameters Parameters                                                                               MANUAL THERAPY: (0 minutes): Joint mobilization, Soft tissue mobilization was utilized and necessary because of the patient's restricted joint motion and restricted motion of soft tissue mobility. Date  9/13/2021    Technique Used Grade  Level # Time(s) Effect while being performed                                                                 HEP Log Date 1.   3 way hip, SLS, laser SAQ 9/13/2021   2.  9/13/2021   3. 9/13/2021   4.    5.           Bizo Portal  Treatment/Session Summary:    Response to Treatment: Pt demonstrated understanding of POC and initial HEP. No increase in pain or adverse reactions. Communication/Consultation:  POC, HEP, PT goals, Faxed initial evaluation to MD.   Equipment provided today: HEP Handout   Recommendations/Intent for next treatment session:   Next visit will focus on Pain Science Education Quad strengthening Hip strengthening soft tissue mobilization. Treatment Plan of Care Effective Dates: 9/13/2021 TO 11/12/2021 (60 days).   Frequency/Duration: 2 times a week for 60 Days         Total Treatment Billable Duration:   30  Rx plus Eval   PT Patient Time In/Time Out  Time In: 1005  Time Out: [address] [place], PT    Future Appointments   Date Time Provider Kalyn Villatoro   9/15/2021 11:15 AM Rondal Lawrence, PT [facility]   9/20/2021 11:15 AM Rondal Lawrence, PT [facility]   9/22/2021 11:15 AM Rondal Lawrenec, PT [facility]   9/28/2021 11:15 AM Rondal Lawrence, PT [facility]   10/1/2021  9:30 AM Sharan Farley MD [facility]   10/4/2021  9:45 AM Rondal Lawrence, PT [facility]   10/6/2021 11:15 AM Rondal Lawrence, PT [facility]   10/11/2021 11:15 AM Deberah Hachita, PT [facility]   10/13/2021 11:15 AM Deberah Hachita, PT [facility]   10/14/2021 11:40 AM Yrn Henry MD [id number]   10/18/2021 11:15 AM Deberah Hachita, PT [facility]   10/20/2021 11:15 AM Deberah Hachita, PT [facility]   10/25/2021 11:15 AM Deberah Hachita, PT [facility]   10/27/2021 11:15 AM Deberah Hachita, PT [facility]   12/16/2021 10:00 AM Frandy Nelson MD [facility]

## 2021-09-15 ENCOUNTER — HOSPITAL ENCOUNTER (OUTPATIENT)
Dept: PHYSICAL THERAPY | Age: 66
Discharge: HOME | End: 2021-09-15
Payer: MEDICARE

## 2021-09-15 PROCEDURE — 97110 THERAPEUTIC EXERCISES: CPT

## 2021-09-15 PROCEDURE — 97530 THERAPEUTIC ACTIVITIES: CPT

## 2021-09-15 NOTE — PROGRESS NOTES
Rahul Teresa  : 1955  Payor: Sadie Night / Plan: [place] / Product Type: Commerical /  [address] [facility], [address], [facility], [address] [place] [address]  [phone]   [phone]      OUTPATIENT PHYSICAL THERAPY: Daily Treatment Note 9/15/2021 Visit Count:  2    Treatment Diagnosis: Pain in right knee (M25.561)  Stiffness of right knee, not elsewhere classified (M25.661)  Presence of right artificial knee joint (Y79.015)    Precautions/Allergies:   Codeine   MD Orders: Eval and Treat  MEDICAL/REFERRING DIAGNOSIS:  Presence of right artificial knee joint [Z96.651]   DATE OF ONSET: 21  REFERRING PHYSICIAN: Vanessa Earl MD  RETURN PHYSICIAN APPOINTMENT: 10/1/21         Pre-treatment Symptoms/Complaints: Pt reports increased soreness with walking today   Pain: Initial:7/10  Medications Last Reviewed:  9/15/2021     Post Session: 4/10   Updated Objective Findings: See Initial Eval for more details. TREATMENT:   THERAPEUTIC EXERCISE: ( 25 minutes):  Exercises per grid below to improve mobility, strength and balance. Required minimal visual, verbal and manual cues to promote proper body alignment and promote proper body posture. Progressed resistance and complexity of movement as indicated. Date:  2021 Date:  9/15/21 Date:     Activity/Exercise Parameters Parameters Parameters   Education HEP, POC, PT goals, anatomy/pathology, importance of walking, education on normal healing process and expectations Education on gentle ROM and use of walking and bike to assist with stiffness    Sit to stand 10x     SAQ Laser 5 min     SCIFit Stepper  Seat 9, 8 min, hill function, lvl 1, 28 calories, 0.4 miles    L Stretch  2 min  Progressed to squat bench x 2 min    Bike  6 min, fwd/backward for self ranging                THERAPEUTIC ACTIVITY: (  15 minutes):  Activities per gid below to improve functional movement related mobility, strength and balance to improve neuro-muscular carryover to daily functional activities for improving patient's quality of life. Required visual, verbal and manual cues to promote proper body alignment and promote proper body posture/mechanics. Progressed resistance and complexity of movement as indicated. Date:  9/15/2021 Date:   Date:     Activity/Exercise Parameters Parameters Parameters   Side stepping Orange band  3 x 12 ft  Left/right       Monster walking Orange band  3 x 12 ft  Fwd/backward       Sit to stand 5 lb   10 x                                                     MANUAL THERAPY: (0 minutes): Joint mobilization, Soft tissue mobilization was utilized and necessary because of the patient's restricted joint motion and restricted motion of soft tissue mobility. Date  9/15/2021    Technique Used Grade  Level # Time(s) Effect while being performed                                                                 HEP Log Date 1.   3 way hip, SLS, laser SAQ 9/13/2021   2.  9/15/2021   3. 9/15/2021   4.    5.           to be Portal  Treatment/Session Summary:    Response to Treatment: Pt responds well to use of bike for self ranging and by end of session able to make full revolution forward and backward. Pt with increased anxiety of \"hurting or damaging\" her replacement and requires education and encouragement from therapist   Communication/Consultation:  None today   Equipment provided today: None today   Recommendations/Intent for next treatment session:   Next visit will focus on Pain Science Education Quad strengthening Hip strengthening soft tissue mobilization. Treatment Plan of Care Effective Dates: 9/13/2021 TO 11/12/2021 (60 days).   Frequency/Duration: 2 times a week for 60 Days       Total Treatment Billable Duration:   40  Rx   PT Patient Time In/Time Out  Time In: 1115  Time Out: 1155  Michell Alarcon PT    Future Appointments Date Time Provider Kalyn Acostai   9/20/2021 11:15 AM Grace Hali, PT [facility]   9/22/2021 11:15 AM Grace Hali, PT [facility]   9/28/2021 11:15 AM Grace Hali, PT [facility]   10/1/2021  9:30 AM Kvng Murray MD [facility]   10/4/2021  9:45 AM Grace Hali, PT [facility]   10/6/2021 11:15 AM Grace Hali, PT [facility]   10/11/2021 11:15 AM Grace Hali, PT [facility]   10/13/2021 11:15 AM Grace Hali, PT [facility]   10/14/2021 11:40 AM Santiago Allan MD [id number]   10/18/2021 11:15 AM Grace Hali, PT [facility]   10/20/2021 11:15 AM Grace Hali, PT [facility]   10/25/2021 11:15 AM Grace Hali, PT [facility]   10/27/2021 11:15 AM Grace Hali, PT [facility]   12/16/2021 10:00 AM Mireya Wilson MD [facility]

## 2021-09-20 ENCOUNTER — HOSPITAL ENCOUNTER (OUTPATIENT)
Dept: PHYSICAL THERAPY | Age: 66
Discharge: HOME | End: 2021-09-20
Payer: MEDICARE

## 2021-09-20 NOTE — PROGRESS NOTES
Tramaine Sanchez  : 1955  Primary: [address]  [place] [facility] at . [address]  [address], [address]  [phone]   [phone]        OUTPATIENT DAILY NOTE    NAME/AGE/GENDER: Tramaine Sanchez is a 78 y.o. female. DATE: 2021    Ms. Abdias Goode CANCELED for today's appointment.     Kassie Yost, PT

## 2021-09-22 ENCOUNTER — HOSPITAL ENCOUNTER (OUTPATIENT)
Dept: PHYSICAL THERAPY | Age: 66
Discharge: HOME | End: 2021-09-22
Payer: MEDICARE

## 2021-09-22 NOTE — PROGRESS NOTES
Dirk Goldberg  : 1955  Primary: [address]  [place] [facility] at . [address]  [address] [place] [address] Drive. [facility], [address] [facility] [address]  [phone]   [phone]        OUTPATIENT DAILY NOTE    NAME/AGE/GENDER: Dirk Goldberg is a 78 y.o. female. DATE: 2021    Ms. Bryson Diss CANCELED for today's appointment due to death in the family.     Nidia Zamora, PT

## 2021-09-24 ENCOUNTER — TRANSCRIBE ORDER (OUTPATIENT)
Dept: SCHEDULING | Age: 66
End: 2021-09-24

## 2021-09-24 DIAGNOSIS — Z12.31 SCREENING MAMMOGRAM FOR HIGH-RISK PATIENT: Primary | ICD-10-CM

## 2021-09-28 ENCOUNTER — HOSPITAL ENCOUNTER (OUTPATIENT)
Dept: PHYSICAL THERAPY | Age: 66
Discharge: HOME | End: 2021-09-28
Payer: MEDICARE

## 2021-09-28 PROCEDURE — 97110 THERAPEUTIC EXERCISES: CPT

## 2021-09-28 PROCEDURE — 97530 THERAPEUTIC ACTIVITIES: CPT

## 2021-09-28 NOTE — PROGRESS NOTES
Angelika Kelsey  : 1955  Payor: Tho Draper / Plan: [place] / Product Type: Commerical /  Olivia Yecenia at [address] [facility], [address], [facility], [address] [place] [address]  [phone]   [phone]      OUTPATIENT PHYSICAL THERAPY: Daily Treatment Note 2021 Visit Count:  3    Treatment Diagnosis: Pain in right knee (M25.561)  Stiffness of right knee, not elsewhere classified (M25.661)  Presence of right artificial knee joint (M56.002)    Precautions/Allergies:   Codeine   MD Orders: Eval and Treat  MEDICAL/REFERRING DIAGNOSIS:  Presence of right artificial knee joint [Z96.651]   DATE OF ONSET: 21  REFERRING PHYSICIAN: Mckenzie Mccallum MD  RETURN PHYSICIAN APPOINTMENT: 10/1/21         Pre-treatment Symptoms/Complaints: Pt reports increased soreness with walking today. Pain: Initial:7/10  Medications Last Reviewed:  2021     Post Session: 4/10   Updated Objective Findings: 5dg to 120 dg        TREATMENT:   THERAPEUTIC EXERCISE: ( 20 minutes):  Exercises per grid below to improve mobility, strength and balance. Required minimal visual, verbal and manual cues to promote proper body alignment and promote proper body posture. Progressed resistance and complexity of movement as indicated.      Date:  2021 Date:  9/15/21 Date:  21   Activity/Exercise Parameters Parameters Parameters   Education HEP, POC, PT goals, anatomy/pathology, importance of walking, education on normal healing process and expectations Education on gentle ROM and use of walking and bike to assist with stiffness    Sit to stand 10x     SAQ Laser 5 min  LASER  SAQ  3 min   SCIFit Stepper  Seat 9, 8 min, hill function, lvl 1, 28 calories, 0.4 miles Seat 9, hill function, lvl 3, 43 calories, 0.6 miles   L Stretch  2 min  Progressed to squat bench x 2 min 2 min  Progressed to squat bench x 2 min   Bike  6 min, fwd/backward for self ranging    Calf stretch   Wedge  2 min   Chair Scoots   3 x 15 ft   TKE   Long sitting  2 min       THERAPEUTIC ACTIVITY: ( 25 minutes): Activities per gid below to improve functional movement related mobility, strength and balance to improve neuro-muscular carryover to daily functional activities for improving patient's quality of life. Required visual, verbal and manual cues to promote proper body alignment and promote proper body posture/mechanics. Progressed resistance and complexity of movement as indicated. Date:  9/15/2021 Date:  9/28/21 Date:     Activity/Exercise Parameters Parameters Parameters   Side stepping Orange band  3 x 12 ft  Left/right Orange Band  3 x 12 ft [employer] walking Orange band  3 x 12 ft  Fwd/backward Orange Band  3 x 12 ft     Sit to stand 5 lb   10 x 10 lb, 21.5 in  3 x 8 reps     Wiggio   10 lb  3 laps                                                HEP Log Date 1.   3 way hip, SLS, laser SAQ 9/13/2021   2.  9/28/2021   3. 9/28/2021   4.    5.           [employer] Portal  Treatment/Session Summary:    Response to Treatment: Pt making significant progress in ROM 5 to 125 dg. Pt with less pain and irritability today and walks without SPC. Pt to continue with LE strengthening and improving quad activation   Communication/Consultation:  None today   Equipment provided today: None today   Recommendations/Intent for next treatment session:   Next visit will focus on Pain Science Education Quad strengthening Hip strengthening soft tissue mobilization. Treatment Plan of Care Effective Dates: 9/13/2021 TO 11/12/2021 (60 days).   Frequency/Duration: 2 times a week for 60 Days       Total Treatment Billable Duration:   45 Rx  PT Patient Time In/Time Out  Time In: 1110  Time Out: 7 Alysia William PT    Future Appointments   Date Time Provider Kalyn Villatoro   10/1/2021  9:30 AM VIVIANA Ladd [facility]   10/4/2021  9:45 AM Nae Peoples PT [facility] [place] [facility]   10/6/2021 11:15 AM Heri Flurry, PT [facility]   10/11/2021 11:15 AM Heri Flurry, PT [facility]   10/13/2021 11:15 AM Heri Flurry, PT [facility]   10/14/2021 11:40 AM Janey Drake MD [id number] [facility]   10/16/2021  3:00 PM SFE [facility] ROOM 4 SFER[facility] SFE   10/18/2021 11:15 AM Heri Flurry, PT [facility]   10/20/2021 11:15 AM Heri Flurry, PT [facility]   10/25/2021 11:15 AM Heri Flurry, PT [facility]   10/27/2021 11:15 AM Heri Flurry, PT [facility]   12/16/2021 10:00 AM Cameron Fisher MD [id number] [place]

## 2021-10-01 PROBLEM — F11.99 OPIOID USE, UNSPECIFIED WITH UNSPECIFIED OPIOID-INDUCED DISORDER (HCC): Status: ACTIVE | Noted: 2021-10-01

## 2021-10-04 ENCOUNTER — HOSPITAL ENCOUNTER (OUTPATIENT)
Dept: PHYSICAL THERAPY | Age: 66
Discharge: HOME | End: 2021-10-04
Payer: MEDICARE

## 2021-10-04 PROCEDURE — 97110 THERAPEUTIC EXERCISES: CPT

## 2021-10-04 PROCEDURE — 97530 THERAPEUTIC ACTIVITIES: CPT

## 2021-10-04 NOTE — PROGRESS NOTES
Angelika Kelsey  : 1955  Payor: [place] / Plan: [address] [place] [address] Avenue / Product Type: Medicaid /  Olivia Keene at 4 [facility]. [address], [place], [address] [place] [address] Road  [phone]   [phone]      OUTPATIENT PHYSICAL THERAPY: Daily Treatment Note 10/4/2021 Visit Count:  4    Treatment Diagnosis: Pain in right knee (M25.561)  Stiffness of right knee, not elsewhere classified (M25.661)  Presence of right artificial knee joint (U54.009)    Precautions/Allergies:   Codeine   MD Orders: Eval and Treat  MEDICAL/REFERRING DIAGNOSIS:  Presence of right artificial knee joint [Z96.651]   DATE OF ONSET: 21  REFERRING PHYSICIAN: Mckenzie Mccallum MD  RETURN PHYSICIAN APPOINTMENT: 10/1/21         Pre-treatment Symptoms/Complaints: Pt reports increased soreness with walking today. Pain: Initial:7/10  Medications Last Reviewed:  10/4/2021     Post Session: 4/10   Updated Objective Findings: 6dg to 120 dg        TREATMENT:   THERAPEUTIC EXERCISE: ( 25 minutes):  Exercises per grid below to improve mobility, strength and balance. Required minimal visual, verbal and manual cues to promote proper body alignment and promote proper body posture. Progressed resistance and complexity of movement as indicated.      Date:  2021 Date:  9/15/21 Date:  21 Date:  10/4/21   Activity/Exercise Parameters Parameters Parameters    Education HEP, POC, PT goals, anatomy/pathology, importance of walking, education on normal healing process and expectations Education on gentle ROM and use of walking and bike to assist with stiffness     Sit to stand 10x      SAQ Laser 5 min  LASER  SAQ  3 min LASER, LAQ  2 min   SCIFit Stepper  Seat 9, 8 min, hill function, lvl 1, 28 calories, 0.4 miles Seat 9, hill function, lvl 3, 43 calories, 0.6 miles Seat 9, random hill function, lvl 3, 55 calories   L Stretch  2 min  Progressed to squat bench x 2 min 2 min  Progressed to squat bench x 2 min 2 min  Progressed to squat bench x 2 min   Bike  6 min, fwd/backward for self ranging     Calf stretch   Wedge  2 min Wedge  2 min   Chair Scoots   3 x 15 ft 3 x 12 ft   TKE   Long sitting  2 min    Shuttle    50 lb, DL  2 x 8 reps reps  Laser on R knee   Ja Stretch    2 min       THERAPEUTIC ACTIVITY: ( 15 minutes): Activities per gid below to improve functional movement related mobility, strength and balance to improve neuro-muscular carryover to daily functional activities for improving patient's quality of life. Required visual, verbal and manual cues to promote proper body alignment and promote proper body posture/mechanics. Progressed resistance and complexity of movement as indicated. Date:  9/15/2021 Date:  9/28/21 Date:  10/4/21   Activity/Exercise Parameters Parameters Parameters   Side stepping Orange band  3 x 12 ft  Left/right Orange Band  3 x 12 ft Red band  3 x 12 ft   Monster walking Orange band  3 x 12 ft  Fwd/backward Orange Band  3 x 12 ft Red band  3 x 12 ft   Sit to stand 5 lb   10 x 10 lb, 21.5 in  3 x 8 reps 10x   goDog Fetch   10 lb  3 laps                                                HEP Log Date 1.   3 way hip, SLS, laser SAQ 9/13/2021   2. Ja stretch 10/4/2021   3. 10/4/2021   4.    5.           [employer] Portal  Treatment/Session Summary:    Response to Treatment: Pt responds well to use of laser to increase knee extensor recruitment and able to implement with use of shuttle. Pt provided with Ja stretch to assist with tightness and improve ROM and pain management at home   Communication/Consultation:  None today   Equipment provided today: None today   Recommendations/Intent for next treatment session:   Next visit will focus on Pain Science Education Quad strengthening Hip strengthening soft tissue mobilization. Treatment Plan of Care Effective Dates: 9/13/2021 TO 11/12/2021 (60 days).   Frequency/Duration: 2 times a week for 60 Days       Total Treatment Billable Duration:   40 Rx, 5 min un-timed due to rest  PT Patient Time In/Time Out  Time In: 0945  Time Out: 9400 [facility],     Future Appointments   Date Time Provider Kalyn Shauna   10/6/2021 11:15 AM Maryjane Grams, PT [facility]   10/11/2021 11:15 AM Maryjane Grams, PT [facility]   10/13/2021 11:15 AM Maryjane Grams, PT [facility]   10/14/2021 11:40 AM Rip Card MD [id number] [facility]   10/16/2021  3:00 PM SFE [facility] ROOM 4 SFERMAM SFE   10/18/2021 11:15 AM Maryjane Grams, PT [facility]   10/20/2021 11:15 AM Maryjane Grams, PT [facility]   10/25/2021 11:15 AM Maryjane Grams, PT [facility]   10/27/2021 11:15 AM Maryjane Grams, PT [facility]   12/16/2021 10:00 AM Joselyn Kwon MD [facility]   3/1/2022 11:30 AM VIVIANA Allan [facility]

## 2021-10-06 ENCOUNTER — HOSPITAL ENCOUNTER (OUTPATIENT)
Dept: PHYSICAL THERAPY | Age: 66
Discharge: HOME | End: 2021-10-06
Payer: MEDICARE

## 2021-10-06 NOTE — PROGRESS NOTES
Shannen Richard  : 1955  [place] Of [address]  Secondary:  [facility] Clotilde Reyes [address] [place] [address]. [address], [address]  [phone]   [phone]        OUTPATIENT DAILY NOTE    NAME/AGE/GENDER: Shannen Richard is a 78 y.o. female. DATE: 10/6/2021    Ms. Lange Person CANCELED for today's appointment due to illness.     Maria R Victoria, PT

## 2021-10-11 ENCOUNTER — HOSPITAL ENCOUNTER (OUTPATIENT)
Dept: PHYSICAL THERAPY | Age: 66
Discharge: HOME | End: 2021-10-11
Payer: MEDICARE

## 2021-10-11 PROCEDURE — 97110 THERAPEUTIC EXERCISES: CPT

## 2021-10-11 PROCEDURE — 97530 THERAPEUTIC ACTIVITIES: CPT

## 2021-10-11 NOTE — PROGRESS NOTES
Amber Hernandez  : 1955  Payor: [place] / Plan: [address] [place] [address] / Product Type: Medicaid /  [id number] at [address] [place] [address]. [address], [place], [address] [place] [address] Road  [phone]   [phone]      OUTPATIENT PHYSICAL THERAPY: Daily Treatment Note 10/11/2021 Visit Count:  5    Treatment Diagnosis: Pain in right knee (M25.561)  Stiffness of right knee, not elsewhere classified (M25.661)  Presence of right artificial knee joint (X57.987)    Precautions/Allergies:   Codeine   MD Orders: Eval and Treat  MEDICAL/REFERRING DIAGNOSIS:  Presence of right artificial knee joint [Z96.651]   DATE OF ONSET: 21  REFERRING PHYSICIAN: Umesh An MD  RETURN PHYSICIAN APPOINTMENT: 10/1/21         Pre-treatment Symptoms/Complaints: Pt reports increased soreness - she walked around [place] flea market yesterday for about 3 hours. Pain: Initial:7/10  Medications Last Reviewed:  10/11/2021     Post Session: 4/10   Updated Objective Findings: 6dg to 120 dg        TREATMENT:   THERAPEUTIC EXERCISE: ( 17 minutes):  Exercises per grid below to improve mobility, strength and balance. Required minimal visual, verbal and manual cues to promote proper body alignment and promote proper body posture. Progressed resistance and complexity of movement as indicated.      Date:  2021 Date:  9/15/21 Date:  21 Date:  10/4/21 Date:  10/11/21   Activity/Exercise Parameters Parameters Parameters     Education HEP, POC, PT goals, anatomy/pathology, importance of walking, education on normal healing process and expectations Education on gentle ROM and use of walking and bike to assist with stiffness      Sit to stand 10x       SAQ Laser 5 min  LASER  SAQ  3 min LASER, LAQ  2 min    SCIFit Stepper  Seat 9, 8 min, hill function, lvl 1, 28 calories, 0.4 miles Seat 9, hill function, lvl 3, 43 calories, 0.6 miles Seat 9, random hill function, lvl 3, 55 calories Seat 9, random hill function, lvl 3, 51 calories x 8 min   L Stretch  2 min  Progressed to squat bench x 2 min 2 min  Progressed to squat bench x 2 min 2 min  Progressed to squat bench x 2 min 2 min   Bike  6 min, fwd/backward for self ranging      Calf stretch   Wedge  2 min Wedge  2 min Wedge  1 min   Chair Scoots   3 x 15 ft 3 x 12 ft    TKE   Long sitting  2 min     Shuttle    50 lb, DL  2 x 8 reps reps  Laser on R knee    Ja Stretch    2 min Prone  2 min   Stair Stretch     2 min       THERAPEUTIC ACTIVITY: ( 28 minutes): Activities per gid below to improve functional movement related mobility, strength and balance to improve neuro-muscular carryover to daily functional activities for improving patient's quality of life. Required visual, verbal and manual cues to promote proper body alignment and promote proper body posture/mechanics. Progressed resistance and complexity of movement as indicated. Date:  9/15/2021 Date:  9/28/21 Date:  10/4/21 Date:  10/11/21   Activity/Exercise Parameters Parameters Parameters    Side stepping Orange band  3 x 12 ft  Left/right Orange Band  3 x 12 ft Red band  3 x 12 ft Red band  3 x 12 ft   Monster walking Orange band  3 x 12 ft  Fwd/backward Orange Band  3 x 12 ft Red band  3 x 12 ft Red band  3 x 12 ft   Sit to stand 5 lb   10 x 10 lb, 21.5 in  3 x 8 reps 10x 10 lb   OH press  3 x 5 reps  19. 5 in   [employer]   10 lb  3 laps   10 lb  3 laps   Step ups       10 lb  3 x 5 reps   Quad Rocking       10 x                            HEP Log Date 1.   3 way hip, SLS, laser SAQ 9/13/2021   2. Ja stretch 10/11/2021   3. 10/11/2021   4.    5.           [employer] Portal  Treatment/Session Summary:    Response to Treatment: Pt initiated on tolerating weight on surgical knee as required for some of her domestic responsibilities for cleaning. Pt with improving LE strength as she is progressively lowering height for sit to stand.  Continue with gluteal strengthening. Communication/Consultation:  None today   Equipment provided today: None today   Recommendations/Intent for next treatment session:   Next visit will focus on Pain Science Education Quad strengthening Hip strengthening soft tissue mobilization. Treatment Plan of Care Effective Dates: 9/13/2021 TO 11/12/2021 (60 days).   Frequency/Duration: 2 times a week for 60 Days       Total Treatment Billable Duration:   45 Rx  PT Patient Time In/Time Out  Time In: 1110  Time Out: 7 Alysia William PT    Future Appointments   Date Time Provider Kalyn Villatoro   10/13/2021 11:15 AM Caridad Larsen, PT [facility]   10/14/2021 11:40 AM Frederick Barajas MD [id number] [facility]   10/16/2021  3:00 PM SFE [facility] ROOM 4 SFERMAM E   10/18/2021 11:15 AM Caridad Larsen, PT [facility]   10/20/2021 11:15 AM Caridad Larsen, PT [facility]   10/25/2021 11:15 AM Caridad Larsen, PT [facility]   10/27/2021 11:15 AM Caridad Larsen, PT [facility]   12/16/2021 10:00 AM Mihir Shya MD [facility]   3/1/2022 11:30 AM VIVIANA Hernandez [facility]

## 2021-10-13 ENCOUNTER — HOSPITAL ENCOUNTER (OUTPATIENT)
Dept: PHYSICAL THERAPY | Age: 66
Discharge: HOME | End: 2021-10-13
Payer: MEDICARE

## 2021-10-13 PROCEDURE — 97530 THERAPEUTIC ACTIVITIES: CPT

## 2021-10-13 PROCEDURE — 97110 THERAPEUTIC EXERCISES: CPT

## 2021-10-13 NOTE — PROGRESS NOTES
Grant Cohen  : 1955  Payor: [place] / Plan: [address] [place] [address] Avenue / Product Type: Medicaid /  [id number], [address], [place], [address] [place] [address]  [phone]   [phone]      OUTPATIENT PHYSICAL THERAPY: Daily Treatment Note 10/13/2021 Visit Count:  6    Treatment Diagnosis: Pain in right knee (M25.561)  Stiffness of right knee, not elsewhere classified (M25.661)  Presence of right artificial knee joint (V39.396)    Precautions/Allergies:   Codeine   MD Orders: Eval and Treat  MEDICAL/REFERRING DIAGNOSIS:  Presence of right artificial knee joint [Z96.651]   DATE OF ONSET: 21  REFERRING PHYSICIAN: Vladimir Lui MD  RETURN PHYSICIAN APPOINTMENT: 10/1/21         Pre-treatment Symptoms/Complaints: Pt reports she feels like she is walking better   Pain: Initial:7/10  Medications Last Reviewed:  10/13/2021     Post Session: 4/10   Updated Objective Findings: 6dg to 120 dg        TREATMENT:   THERAPEUTIC EXERCISE: ( 20 minutes):  Exercises per grid below to improve mobility, strength and balance. Required minimal visual, verbal and manual cues to promote proper body alignment and promote proper body posture. Progressed resistance and complexity of movement as indicated.      Date:  2021 Date:  9/15/21 Date:  21 Date:  10/4/21 Date:  10/11/21 Date:  10/13/21   Activity/Exercise Parameters Parameters Parameters      Education HEP, POC, PT goals, anatomy/pathology, importance of walking, education on normal healing process and expectations Education on gentle ROM and use of walking and bike to assist with stiffness       Sit to stand 10x        SAQ Laser 5 min  LASER  SAQ  3 min LASER, LAQ  2 min     SCIFit Stepper  Seat 9, 8 min, hill function, lvl 1, 28 calories, 0.4 miles Seat 9, hill function, lvl 3, 43 calories, 0.6 miles Seat 9, random hill function, lvl 3, 55 calories Seat 9, random hill function, lvl 3, 51 calories x 8 min Seat 9, random hill function, lvl 3, 60 calories x 10 min   L Stretch  2 min  Progressed to squat bench x 2 min 2 min  Progressed to squat bench x 2 min 2 min  Progressed to squat bench x 2 min 2 min 1 min   Bike  6 min, fwd/backward for self ranging       Calf stretch   Wedge  2 min Wedge  2 min Wedge  1 min Wedge  1 min   Chair Scoots   3 x 15 ft 3 x 12 ft     TKE   Long sitting  2 min      Shuttle    50 lb, DL  2 x 8 reps reps  Laser on R knee  75 lb DL  3 x 8 reps   Ja Stretch/Rectus femoris stretch    2 min Prone  2 min Standing  3 x 30 sec   Stair Stretch     2 min        THERAPEUTIC ACTIVITY: ( 25 minutes): Activities per gid below to improve functional movement related mobility, strength and balance to improve neuro-muscular carryover to daily functional activities for improving patient's quality of life. Required visual, verbal and manual cues to promote proper body alignment and promote proper body posture/mechanics. Progressed resistance and complexity of movement as indicated. Date:  9/15/2021 Date:  9/28/21 Date:  10/4/21 Date:  10/11/21 Date:  10/13/21   Activity/Exercise Parameters Parameters Parameters     Side stepping Orange band  3 x 12 ft  Left/right Orange Band  3 x 12 ft Red band  3 x 12 ft Red band  3 x 12 ft Orange band  3 x 12 ft   Monster walking Orange band  3 x 12 ft  Fwd/backward Orange Band  3 x 12 ft Red band  3 x 12 ft Red band  3 x 12 ft Orange band  3 x 12 ft   Sit to stand 5 lb   10 x 10 lb, 21.5 in  3 x 8 reps 10x 10 lb   OH press  3 x 5 reps  19. 5 in    [employer]   10 lb  3 laps   10 lb  3 laps 15 lb  3 laps   Step ups       10 lb  3 x 5 reps    Quad Rocking       10 x    [employer]         27 lb  12x   KB DL         15 lb  3 x 5 reps                HEP Log Date 1.   3 way hip, SLS, laser SAQ 9/13/2021   2.  Ja stretch 10/13/2021   3. 10/13/2021   4.    5.           [employer] Portal  Treatment/Session Summary: Response to Treatment: Pt is avoidant of knee flexion with KB DL, however able to improve with VCs. Pt attains 2 dg of knee extension today on shuttle and continues to show positive progress in ROM and steady gains in overall strength. Communication/Consultation:  None today   Equipment provided today: None today   Recommendations/Intent for next treatment session:   Next visit will focus on Pain Science Education Quad strengthening Hip strengthening soft tissue mobilization. Treatment Plan of Care Effective Dates: 9/13/2021 TO 11/12/2021 (60 days).   Frequency/Duration: 2 times a week for 60 Days       Total Treatment Billable Duration:   45 Rx  PT Patient Time In/Time Out  Time In: 1115  Time Out: 1200  Karon Cavazos PT    Future Appointments   Date Time Provider Kalyn Villatoro   10/14/2021 11:40 AM Lolis Chavez MD [id number] [place]   10/16/2021  3:00 PM SFE [facility]   10/18/2021 11:15 AM Livier Camps, PT [facility]   10/20/2021 11:15 AM Livier Camps, PT [facility]   10/25/2021 11:15 AM Livier Camps, PT [facility]   10/27/2021 11:15 AM Livier Camps, PT [facility]   12/16/2021 10:00 AM Sheri Fabry, MD [facility]   3/1/2022 11:30 AM VIVIANA Alexandra BS[facility]DT [facility]

## 2021-10-14 ENCOUNTER — HOSPITAL ENCOUNTER (OUTPATIENT)
Dept: MAMMOGRAPHY | Age: 66
Discharge: HOME | End: 2021-10-14
Attending: FAMILY MEDICINE
Payer: MEDICARE

## 2021-10-14 DIAGNOSIS — Z12.31 SCREENING MAMMOGRAM, ENCOUNTER FOR: ICD-10-CM

## 2021-10-14 PROCEDURE — 77067 SCR MAMMO BI INCL CAD: CPT

## 2021-10-18 ENCOUNTER — HOSPITAL ENCOUNTER (OUTPATIENT)
Dept: PHYSICAL THERAPY | Age: 66
Discharge: HOME | End: 2021-10-18
Payer: MEDICARE

## 2021-10-18 PROCEDURE — 97530 THERAPEUTIC ACTIVITIES: CPT

## 2021-10-18 PROCEDURE — 97110 THERAPEUTIC EXERCISES: CPT

## 2021-10-18 NOTE — PROGRESS NOTES
Chelle Bloom  : 1955  Payor: [place] / Plan: [address] [place] [address] / Product Type: Medicaid /  [address] [facility] at [address] [place] [address]. [address], [place], [address] [place] [address]  [phone]   [phone]      OUTPATIENT PHYSICAL THERAPY: Daily Treatment Note 10/18/2021 Visit Count:  7    Treatment Diagnosis: Pain in right knee (M25.561)  Stiffness of right knee, not elsewhere classified (M25.661)  Presence of right artificial knee joint (D18.803)    Precautions/Allergies:   Codeine   MD Orders: Eval and Treat  MEDICAL/REFERRING DIAGNOSIS:  Presence of right artificial knee joint [Z96.651]   DATE OF ONSET: 21  REFERRING PHYSICIAN: Teresita Livingston MD  RETURN PHYSICIAN APPOINTMENT: 10/1/21         Pre-treatment Symptoms/Complaints: Pt reports she participated in a fall festival this weekend. She stood and walked around for several hours and reports increased stiffness and discomfort in the knee. Pain: Initial:7/10  Medications Last Reviewed:  10/18/2021     Post Session: 4/10   Updated Objective Findings: 6dg to 120 dg        TREATMENT:   THERAPEUTIC EXERCISE: ( 23 minutes):  Exercises per grid below to improve mobility, strength and balance. Required minimal visual, verbal and manual cues to promote proper body alignment and promote proper body posture. Progressed resistance and complexity of movement as indicated.      Date:  2021 Date:  9/15/21 Date:  21 Date:  10/4/21 Date:  10/11/21 Date:  10/13/21 Date:  10/18/21   Activity/Exercise Parameters Parameters Parameters       Education HEP, POC, PT goals, anatomy/pathology, importance of walking, education on normal healing process and expectations Education on gentle ROM and use of walking and bike to assist with stiffness        Sit to stand 10x         SAQ Laser 5 min  LASER  SAQ  3 min LASER, LAQ  2 min      SCIFit Stepper  Seat 9, 8 min, hill function, lvl  calories, 0.4 miles Seat 9, hill function, lvl 3, 43 calories, 0.6 miles Seat 9, random hill function, lvl 3, 55 calories Seat 9, random hill function, lvl 3, 51 calories x 8 min Seat 9, random hill function, lvl 3, 60 calories x 10 min Seat 9, random hill function, lvl 3, 49 calories, 0.6 miles  x 8 min   L Stretch  2 min  Progressed to squat bench x 2 min 2 min  Progressed to squat bench x 2 min 2 min  Progressed to squat bench x 2 min 2 min 1 min    Bike  6 min, fwd/backward for self ranging        Calf stretch   Wedge  2 min Wedge  2 min Wedge  1 min Wedge  1 min Wedge  3 min   Chair Scoots   3 x 15 ft 3 x 12 ft      TKE   Long sitting  2 min       Shuttle    50 lb, DL  2 x 8 reps reps  Laser on R knee  75 lb DL  3 x 8 reps 50 lb x 5 reps  83.5 lb  3 x 8 reps   Ja Stretch/Rectus femoris stretch    2 min Prone  2 min Standing  3 x 30 sec    Stair Stretch     2 min     Agility ladder       In/out  6 reps       THERAPEUTIC ACTIVITY: ( 15 minutes): Activities per gid below to improve functional movement related mobility, strength and balance to improve neuro-muscular carryover to daily functional activities for improving patient's quality of life. Required visual, verbal and manual cues to promote proper body alignment and promote proper body posture/mechanics. Progressed resistance and complexity of movement as indicated.      Date:  9/15/2021 Date:  9/28/21 Date:  10/4/21 Date:  10/11/21 Date:  10/13/21 Date:  10/18/21   Activity/Exercise Parameters Parameters Parameters      Side stepping Orange band  3 x 12 ft  Left/right Orange Band  3 x 12 ft Red band  3 x 12 ft Red band  3 x 12 ft Orange band  3 x 12 ft Red band  3 x 12 ft   Monster walking Orange band  3 x 12 ft  Fwd/backward Orange Band  3 x 12 ft Red band  3 x 12 ft Red band  3 x 12 ft Orange band  3 x 12 ft Red band  3 x 12 ft   Sit to stand 5 lb   10 x 10 lb, 21.5 in  3 x 8 reps 10x 10 lb   OH press  3 x 5 reps  19. 5 in     [employer]   10 lb  3 laps   10 lb  3 laps 15 lb  3 laps    Step ups       10 lb  3 x 5 reps     Quad Rocking       10 x     Beast Walks         27 lb  12x    KB DL         15 lb  3 x 5 reps    Circuit 3 rounds          1. Sit to stand 8 lb, 7 reps, 21 in  2. Agility ladder  3. KB DL 15 lb x 7 reps  4. Fast paced walk 100 ft  TIME:5:44 min                HEP Log Date 1.   3 way hip, SLS, laser SAQ 9/13/2021   2. Ja stretch 10/13/2021   3. 10/18/2021   4.    5.           [employer] Portal  Treatment/Session Summary:    Response to Treatment: Pt demos improving functional capacity as imitated on circuit training. Pt continues to have decreased strength in LEs as she has difficulty completing a sit to stand from standard height chair and needs modification in height to complete. Communication/Consultation:  None today   Equipment provided today: None today   Recommendations/Intent for next treatment session:   Next visit will focus on Pain Science Education Quad strengthening Hip strengthening soft tissue mobilization. Treatment Plan of Care Effective Dates: 9/13/2021 TO 11/12/2021 (60 days).   Frequency/Duration: 2 times a week for 60 Days       Total Treatment Billable Duration:   38 Rx, 7 min un-timed due to rest breaks  PT Patient Time In/Time Out  Time In: 1110  Time Out: 7 Alysia William PT    Future Appointments   Date Time Provider Kalyn Villatoro   10/20/2021 11:15 AM Jamie Dillon PT [facility]   10/25/2021 11:15 AM Jamie Dillon PT [facility]   10/27/2021 11:15 AM Jamie Dillon PT [facility]   12/16/2021 10:00 AM Lori Root MD [facility]   1/14/2022 11:20 AM Ramiro Rawls MD [facility] [place]   3/1/2022 11:30 AM VIVIANA Hinton [facility]

## 2021-10-20 ENCOUNTER — HOSPITAL ENCOUNTER (OUTPATIENT)
Dept: PHYSICAL THERAPY | Age: 66
Discharge: HOME | End: 2021-10-20
Payer: MEDICARE

## 2021-10-20 NOTE — PROGRESS NOTES
Sylvia Hdez  : 1955  [place]  Secondary:  Therapy Center at [facility] Clotilde Reyes [address]  [address] [place] [address] Drive. [facility] 17, [address]  [phone]   [phone]        OUTPATIENT DAILY NOTE    NAME/AGE/GENDER: Sylvia Hdez is a 78 y.o. female. DATE: 10/20/2021    Ms. Fabiano Ramon CANCELED for today's appointment due to personal reasons.     Tony Artis, PT

## 2021-10-25 ENCOUNTER — HOSPITAL ENCOUNTER (OUTPATIENT)
Dept: PHYSICAL THERAPY | Age: 66
Discharge: HOME | End: 2021-10-25
Payer: MEDICARE

## 2021-10-25 PROCEDURE — 97530 THERAPEUTIC ACTIVITIES: CPT

## 2021-10-25 PROCEDURE — 97110 THERAPEUTIC EXERCISES: CPT

## 2021-10-25 NOTE — PROGRESS NOTES
Eros Rivas  : 1955  Payor: [place] / Plan: [address] [place] [address] / Product Type: Medicaid /  2809 [facility] at [address] [place] [address]. [address], [place], [address] [place] [address]  [phone]   [phone]      OUTPATIENT PHYSICAL THERAPY: Daily Treatment Note 10/25/2021 Visit Count:  8    Treatment Diagnosis: Pain in right knee (M25.561)  Stiffness of right knee, not elsewhere classified (M25.661)  Presence of right artificial knee joint (K63.759)    Precautions/Allergies:   Codeine   MD Orders: Eval and Treat  MEDICAL/REFERRING DIAGNOSIS:  Presence of right artificial knee joint [Z96.651]   DATE OF ONSET: 21  REFERRING PHYSICIAN: Amber Isaacs MD  RETURN PHYSICIAN APPOINTMENT: 10/1/21         Pre-treatment Symptoms/Complaints: Pt reports she tried to walk 1 mile, but had significant increase in pain this weekend. Pain: Initial:7/10  Medications Last Reviewed:  10/25/2021     Post Session: 4/10   Updated Objective Findings: 6dg to 120 dg        TREATMENT:   THERAPEUTIC EXERCISE: ( 15 minutes):  Exercises per grid below to improve mobility, strength and balance. Required minimal visual, verbal and manual cues to promote proper body alignment and promote proper body posture. Progressed resistance and complexity of movement as indicated.      Date:  2021 Date:  9/15/21 Date:  21 Date:  10/4/21 Date:  10/11/21 Date:  10/13/21 Date:  10/18/21 Date:  10/25/21   Activity/Exercise Parameters Parameters Parameters        Education HEP, POC, PT goals, anatomy/pathology, importance of walking, education on normal healing process and expectations Education on gentle ROM and use of walking and bike to assist with stiffness         Sit to stand 10x          SAQ Laser 5 min  LASER  SAQ  3 min LASER, LAQ  2 min       SCIFit Stepper  Seat 9, 8 min, hill function, lvl 1, 28 calories, 0.4 miles Seat 9, hill function, lvl 3, 43 calories, 0.6 miles Seat 9, random hill function, lvl 3, 55 calories Seat 9, random hill function, lvl 3, 51 calories x 8 min Seat 9, random hill function, lvl 3, 60 calories x 10 min Seat 9, random hill function, lvl 3, 49 calories, 0.6 miles  x 8 min    L Stretch  2 min  Progressed to squat bench x 2 min 2 min  Progressed to squat bench x 2 min 2 min  Progressed to squat bench x 2 min 2 min 1 min     Bike  6 min, fwd/backward for self ranging      4 min fwd/backward  44 calories   Calf stretch   Wedge  2 min Wedge  2 min Wedge  1 min Wedge  1 min Wedge  3 min    Chair Scoots   3 x 15 ft 3 x 12 ft       TKE   Long sitting  2 min        Shuttle    50 lb, DL  2 x 8 reps reps  Laser on R knee  75 lb DL  3 x 8 reps 50 lb x 5 reps  83.5 lb  3 x 8 reps 100lb  3 x 5 reps   Ja Stretch/Rectus femoris stretch    2 min Prone  2 min Standing  3 x 30 sec     Stair Stretch     2 min      Agility ladder       In/out  6 reps        THERAPEUTIC ACTIVITY: ( 23 minutes): Activities per gid below to improve functional movement related mobility, strength and balance to improve neuro-muscular carryover to daily functional activities for improving patient's quality of life. Required visual, verbal and manual cues to promote proper body alignment and promote proper body posture/mechanics. Progressed resistance and complexity of movement as indicated.      Date:  9/15/2021 Date:  9/28/21 Date:  10/4/21 Date:  10/11/21 Date:  10/13/21 Date:  10/18/21 Date:  10/25/21   Activity/Exercise Parameters Parameters Parameters       Side stepping Orange band  3 x 12 ft  Left/right Orange Band  3 x 12 ft Red band  3 x 12 ft Red band  3 x 12 ft Orange band  3 x 12 ft Red band mind calf  3 x 12 ft Red band mind calf  3 x 12 ft   Monster walking Orange band  3 x 12 ft  Fwd/backward Orange Band  3 x 12 ft Red band  3 x 12 ft Red band  3 x 12 ft Orange band  3 x 12 ft Red band  3 x 12 ft Red band mind calf  3 x 12 ft   Sit to stand 5 lb   10 x 10 lb, 21.5 in  3 x 8 reps 10x 10 lb   OH press  3 x 5 reps  19. 5 in      [employer]   10 lb  3 laps   10 lb  3 laps 15 lb  3 laps  15 lb  3 laps   Step ups       10 lb  3 x 5 reps   10 lb  2 x 8 reps  Left/right   Quad Rocking       10 x      Beast Walks         27 lb  12x     KB DL         15 lb  3 x 5 reps     Circuit 3 rounds          1. Sit to stand 8 lb, 7 reps, 21 in  2. Agility ladder  3. KB DL 15 lb x 7 reps  4. Fast paced walk 100 ft  TIME:5:44 min 1. Sit to stand 8 lb, 7 reps, 21 in  2. Agility ladder  3. KB DL 15 lb x 7 reps  4. Fast paced walk 100 ft  TIME:4:38 min                   HEP Log Date 1.   3 way hip, SLS, laser SAQ 9/13/2021   2. Ja stretch 10/13/2021   3. 10/25/2021   4.    5.           [employer] Portal  Treatment/Session Summary:    Response to Treatment: Pt to initiate rack pulls next visit. By end of session pt reports no stiffness in knees. Pt with mild anterior knee pain with descent of stairs and reciprocal pattern. Continue to work on single limb control and balance for return to walking program in community setting. Communication/Consultation:  None today   Equipment provided today: None today   Recommendations/Intent for next treatment session:   Next visit will focus on Pain Science Education Quad strengthening Hip strengthening soft tissue mobilization. Treatment Plan of Care Effective Dates: 9/13/2021 TO 11/12/2021 (60 days).   Frequency/Duration: 2 times a week for 60 Days       Total Treatment Billable Duration:   38 Rx, 7 min un-timed due to rest breaks  PT Patient Time In/Time Out  Time In: 1115  Time Out: 601 [facility],     Future Appointments   Date Time Provider Kalyn Villatoro   10/27/2021 11:15 AM Dominik Keenan PT [facility]   12/16/2021 10:00 AM Maritza Mcdonough MD [facility]   1/14/2022 11:20 AM Evelin Pack MD O[facility] [place]   3/1/2022 11:30 AM VIVIANA Arguello [facility]

## 2021-10-27 ENCOUNTER — HOSPITAL ENCOUNTER (OUTPATIENT)
Dept: PHYSICAL THERAPY | Age: 66
Discharge: HOME | End: 2021-10-27
Payer: MEDICARE

## 2021-10-27 PROCEDURE — 97110 THERAPEUTIC EXERCISES: CPT

## 2021-10-27 PROCEDURE — 97530 THERAPEUTIC ACTIVITIES: CPT

## 2021-10-27 NOTE — PROGRESS NOTES
Chava Tristan  : 1955  Payor: [place] / Plan: [address] [place] [address] Avenue / Product Type: Medicaid /  Florinda Gtz at [address] [place] [address]. [address], [facility], [address] [place] [address]  [phone]   [phone]      OUTPATIENT PHYSICAL THERAPY: Daily Treatment Note 10/27/2021 Visit Count:  9    Treatment Diagnosis: Pain in right knee (M25.561)  Stiffness of right knee, not elsewhere classified (M25.661)  Presence of right artificial knee joint (B57.392)    Precautions/Allergies:   Codeine   MD Orders: Eval and Treat  MEDICAL/REFERRING DIAGNOSIS:  Presence of right artificial knee joint [Z96.651]   DATE OF ONSET: 21  REFERRING PHYSICIAN: Diogo Anglin MD  RETURN PHYSICIAN APPOINTMENT: 10/1/21         Pre-treatment Symptoms/Complaints: Pt reports she has returned to walking most days of the week with no issue. Pt reports continued stiffness in the morning that resolves with movement. Pain: Initial:7/10  Medications Last Reviewed:  10/27/2021     Post Session: 4/10   Updated Objective Findings: 6 min walk 2068 ft over level and unlevel terrain        TREATMENT:   THERAPEUTIC EXERCISE: ( 15 minutes):  Exercises per grid below to improve mobility, strength and balance. Required minimal visual, verbal and manual cues to promote proper body alignment and promote proper body posture. Progressed resistance and complexity of movement as indicated.      Date:  2021 Date:  9/15/21 Date:  21 Date:  10/4/21 Date:  10/11/21 Date:  10/13/21 Date:  10/18/21 Date:  10/25/21 Date:  10/27/21   Activity/Exercise Parameters Parameters Parameters         Education HEP, POC, PT goals, anatomy/pathology, importance of walking, education on normal healing process and expectations Education on gentle ROM and use of walking and bike to assist with stiffness       Assessment of functional goals, ROM, and 6 min walk test   Sit to stand 10x           SAQ Laser 5 min  LASER  SAQ  3 min LASER, LAQ  2 min        SCIFit Stepper  Seat 9, 8 min, hill function, lvl 1, 28 calories, 0.4 miles Seat 9, hill function, lvl 3, 43 calories, 0.6 miles Seat 9, random hill function, lvl 3, 55 calories Seat 9, random hill function, lvl 3, 51 calories x 8 min Seat 9, random hill function, lvl 3, 60 calories x 10 min Seat 9, random hill function, lvl 3, 49 calories, 0.6 miles  x 8 min  Seat 9, random hill function, lvl 3, 51 calories x 8 min   L Stretch  2 min  Progressed to squat bench x 2 min 2 min  Progressed to squat bench x 2 min 2 min  Progressed to squat bench x 2 min 2 min 1 min      Bike  6 min, fwd/backward for self ranging      4 min fwd/backward  44 calories    Calf stretch   Wedge  2 min Wedge  2 min Wedge  1 min Wedge  1 min Wedge  3 min  Wedge  2 min   Chair Scoots   3 x 15 ft 3 x 12 ft        TKE   Long sitting  2 min         Shuttle    50 lb, DL  2 x 8 reps reps  Laser on R knee  75 lb DL  3 x 8 reps 50 lb x 5 reps  83.5 lb  3 x 8 reps 100lb  3 x 5 reps    Ja Stretch/Rectus femoris stretch    2 min Prone  2 min Standing  3 x 30 sec      Stair Stretch     2 min       Agility ladder       In/out  6 reps         THERAPEUTIC ACTIVITY: ( 23 minutes): Activities per gid below to improve functional movement related mobility, strength and balance to improve neuro-muscular carryover to daily functional activities for improving patient's quality of life. Required visual, verbal and manual cues to promote proper body alignment and promote proper body posture/mechanics. Progressed resistance and complexity of movement as indicated.      Date:  9/15/2021 Date:  9/28/21 Date:  10/4/21 Date:  10/11/21 Date:  10/13/21 Date:  10/18/21 Date:  10/25/21 Date:  10/27/21   Activity/Exercise Parameters Parameters Parameters        Side stepping Orange band  3 x 12 ft  Left/right Orange Band  3 x 12 ft Red band  3 x 12 ft Red band  3 x 12 ft Orange band  3 x 12 ft Red band mind calf  3 x 12 ft Red band mind calf  3 x 12 ft    Monster walking Orange band  3 x 12 ft  Fwd/backward Orange Band  3 x 12 ft Red band  3 x 12 ft Red band  3 x 12 ft Orange band  3 x 12 ft Red band  3 x 12 ft Red band mind calf  3 x 12 ft    Sit to stand 5 lb   10 x 10 lb, 21.5 in  3 x 8 reps 10x 10 lb   OH press  3 x 5 reps  19. 5 in    12x   [employer]   10 lb  3 laps   10 lb  3 laps 15 lb  3 laps  15 lb  3 laps 20 lb  3 laps   Step ups       10 lb  3 x 5 reps   10 lb  2 x 8 reps  Left/right 15 lb  12 x   Quad Rocking       10 x       Beast Walks         27 lb  12x      KB DL         15 lb  3 x 5 reps      Circuit 3 rounds          1. Sit to stand 8 lb, 7 reps, 21 in  2. Agility ladder  3. KB DL 15 lb x 7 reps  4. Fast paced walk 100 ft  TIME:5:44 min 1. Sit to stand 8 lb, 7 reps, 21 in  2. Agility ladder  3. KB DL 15 lb x 7 reps  4. Fast paced walk 100 ft  TIME:4:38 min       Step downs            Lateral/anterior 4 in block  2 x 10 reps left/right each direction                HEP Log Date 1.   3 way hip, SLS, laser SAQ 9/13/2021   2. Ja stretch 10/13/2021   3. 10/27/2021   4.    5.           [employer] Portal  Treatment/Session Summary:    Response to Treatment: Pt to d/c with HEP. See d/c summary for details. Communication/Consultation:  None today   Equipment provided today: None today   Recommendations/Intent for next treatment session:   Next visit will focus on Pain Science Education Quad strengthening Hip strengthening soft tissue mobilization. Treatment Plan of Care Effective Dates: 9/13/2021 TO 11/12/2021 (60 days).   Frequency/Duration: 2 times a week for 60 Days       Total Treatment Billable Duration:   38 Rx  PT Patient Time In/Time Out  Time In: 1115  Time Out: 100 Pin Shannon Stuart PT    Future Appointments   Date Time Provider Kalyn Villatoro   12/16/2021 10:00 AM Mihir Shay MD [facility]   1/14/2022 11:20 AM Frederick Barajas MD [facility] [place]   3/1/2022 11:30 AM VIVIANA Hernandez [facility]

## 2021-10-27 NOTE — THERAPY EVALUATION
Darek Douglas  : 1955  [place] [facility]  Secondary:  [facility]  [address] [place] [address], [address]  [phone]   [phone]         OUTPATIENT PHYSICAL THERAPY: DISCHARGE Assessment 10/27/2021    Treatment Diagnosis: Pain in right knee (M25.561)  Stiffness of right knee, not elsewhere classified (M25.661)  Presence of right artificial knee joint (K22.084)    Precautions/Allergies:   Codeine   MD Orders: Eval and Treat  MEDICAL/REFERRING DIAGNOSIS:  Presence of right artificial knee joint [Z96.651]   DATE OF ONSET: 21  REFERRING PHYSICIAN: Evelina Crum MD  RETURN PHYSICIAN APPOINTMENT: 10/1/21     DISCHARGE ASSESSMENT:  Ms. Lady Glover has met 5/6 STGs and 3/4 LTGs over course of therapy intervention. Pt reports that she has returned to normal activities in the household and returned to her walking program most days of the week with no issue. Pt has also attained ROM goals and complete HEP on a daily basis. Pt needs to continue to focus on her overall LE strength, but feels comfortable continuing with HEP independently. Pt no longer requires skilled therapy intervention. PT POC closed. TREATMENT PLAN:  Effective Dates: 2021 TO 2021 (60 days). Frequency/Duration: 2 times a week for 60 Days    GOALS: (Goals have been discussed and agreed upon with patient.)   Short-Term Goals: 30 days  Goal Met   1. Darek Douglas will be independent with HEP to maintain functional gains made with therapy intervention. 1.  [x] Date: 10/27/21   2. Darek Douglas will be able to stand x 15 min order to prep meals at home. 2.  [x] Date: 10/27/21   3. Darek Douglas will be able to complete 6 minute walk test without AD at >=1300 ft to return to walking with dog. 3.  [x] Date: 10/27/21   4.  Darek Douglas will be able to perform 11 sit to stand transfers in 30 seconds from standard height chair without UE support in order to improve strength and community access at restaurants 4. [] Date: not met 7 reps   5. Sylvia Hdez will demonstrate Right knee extension to <= 5 degrees to improve functional mobility and gait mechanics 5. [x] Date: 10/27/21   6. Sylvia Hdez will demonstrate  right knee flexion >= 125 degrees to improve functional mobility, stooping, and squatting  6. [x] Date: 10/27/21    Long Term Goals: 60 days Goal Met   1. Sylvia Hdez to increase lower extremity functional scale by 20 points to show improvement in household and community mobility. 1.  [x] Date: 10/27/21   2. Sylvia Hdez will be able to carry 20 lbs in bilateral UE in order to complete household chores, community participation, and work needs. 2.  [x] Date: 10/27/21   3. Sylvia Hdez will be able to lift 45 lb from floor in order to complete household chores and improve overall strength 3.  [] Date: not met   4. Sylvia Hdez will be able to walk x 12 min on hill function of treadmill in order to return to wellness program. 4.  [x] Date: 10/27/21       Outcome Measure: Tool Used: Lower Extremity Functional Scale (LEFS)  Score:  Initial: 48/80 Most Recent: 80/80 (Date: 10/27/21 )   Interpretation of Score: 20 questions each scored on a 5 point scale with 0 representing \"extreme difficulty or unable to perform\" and 4 representing \"no difficulty\". The lower the score, the greater the functional disability. 80/80 represents no disability. Minimal detectable change is 9 points. Tool Used: Gait Speed   Score:  Initial Self Selected Walking Speed:  3.28 ft/sec    Initial Max Walking Speed: 5.46 ft/sec   Interpretation of Score: Walking speed is used for assessing and monitoring functional status and over all health on an individual. The individual walks for 5 meters/16.4 ft with the therapist timing.  The individual has an acceleration phase of 3 meters, or 9.8ft, prior to timing is initiated. A community ambulator walks at 2.62 ft/sec to 4.32 ft/sec. The [employer] [employer] for a community dwelling older adult is a change of 0.45 ft/sec at a self selected pace. A MDC is not yet established for a community dwelling older adult for max walking speed.      Tool Used: 30 sec Sit to Stand  Score:  Initial: 7 reps Most Recent: 7 reps reps (Date: 10/27/21 )         Total Treatment Duration: 38 min - see daily note       Thank you for this referral,  Nazario Erwin PT     Referring Physician Signature: Leilani Amador MD          EXAMINATION:       AROM/PROM         Joint: Eval Date: 9/13/21  Re-Assess Date: 10/27/21  Re-Assess Date:    Active ROM RIGHT LEFT RIGHT LEFT RIGHT LEFT   Knee Extension 10 0 5      Knee Flexion 120 (seated) 130 125

## 2022-03-18 PROBLEM — M54.2 CERVICALGIA: Status: ACTIVE | Noted: 2020-09-17

## 2022-03-18 PROBLEM — K76.89 LIVER CYST: Status: ACTIVE | Noted: 2017-08-01

## 2022-03-18 PROBLEM — F33.9 RECURRENT MAJOR DEPRESSIVE DISORDER (HCC): Status: ACTIVE | Noted: 2017-07-31

## 2022-03-18 PROBLEM — M75.101 TEAR OF RIGHT ROTATOR CUFF: Status: ACTIVE | Noted: 2018-07-24

## 2022-03-18 PROBLEM — Z00.00 MEDICARE ANNUAL WELLNESS VISIT, SUBSEQUENT: Status: ACTIVE | Noted: 2019-05-06

## 2022-03-18 PROBLEM — S42.251A CLOSED FRACTURE OF GREATER TUBEROSITY OF RIGHT HUMERUS: Status: ACTIVE | Noted: 2018-08-01

## 2022-03-19 PROBLEM — Z86.718 HISTORY OF DVT (DEEP VEIN THROMBOSIS): Status: ACTIVE | Noted: 2019-05-06

## 2022-03-19 PROBLEM — J32.9 SINUSITIS: Status: ACTIVE | Noted: 2021-04-14

## 2022-03-19 PROBLEM — M54.10 RADICULOPATHY AFFECTING UPPER EXTREMITY: Status: ACTIVE | Noted: 2020-09-17

## 2022-03-19 PROBLEM — M54.40 ACUTE RIGHT-SIDED LOW BACK PAIN WITH SCIATICA: Status: ACTIVE | Noted: 2020-01-09

## 2022-03-19 PROBLEM — Z86.711 HISTORY OF PULMONARY EMBOLUS (PE): Status: ACTIVE | Noted: 2020-01-09

## 2022-03-19 PROBLEM — F41.1 GENERALIZED ANXIETY DISORDER: Status: ACTIVE | Noted: 2017-07-31

## 2022-03-19 PROBLEM — M19.011 OSTEOARTHRITIS OF RIGHT ACROMIOCLAVICULAR JOINT: Status: ACTIVE | Noted: 2018-07-24

## 2022-03-19 PROBLEM — M25.512 LEFT SHOULDER PAIN: Status: ACTIVE | Noted: 2020-09-17

## 2022-03-19 PROBLEM — S40.012A CONTUSION OF LEFT SHOULDER: Status: ACTIVE | Noted: 2020-09-04

## 2022-03-19 PROBLEM — F51.01 PRIMARY INSOMNIA: Status: ACTIVE | Noted: 2019-02-03

## 2022-03-19 PROBLEM — Z91.09 ENVIRONMENTAL ALLERGIES: Status: ACTIVE | Noted: 2019-05-06

## 2022-03-19 PROBLEM — K76.9 LESION OF LIVER: Status: ACTIVE | Noted: 2018-10-29

## 2022-03-19 PROBLEM — Z71.89 ACP (ADVANCE CARE PLANNING): Status: ACTIVE | Noted: 2019-05-06

## 2022-03-19 PROBLEM — M25.522 LEFT ELBOW PAIN: Status: ACTIVE | Noted: 2020-09-17

## 2022-03-19 PROBLEM — Z01.818 PREOPERATIVE CLEARANCE: Status: ACTIVE | Noted: 2021-07-30

## 2022-03-19 PROBLEM — J45.909 ASTHMA: Status: ACTIVE | Noted: 2018-02-20

## 2022-03-19 PROBLEM — M17.11 ARTHRITIS OF RIGHT KNEE: Status: ACTIVE | Noted: 2021-08-19

## 2022-03-19 PROBLEM — Z91.199 NONCOMPLIANCE WITH CPAP TREATMENT: Status: ACTIVE | Noted: 2021-08-04

## 2022-03-19 PROBLEM — Z91.14 NONCOMPLIANCE WITH CPAP TREATMENT: Status: ACTIVE | Noted: 2021-08-04

## 2022-03-19 PROBLEM — J30.1 ACUTE SEASONAL ALLERGIC RHINITIS DUE TO POLLEN: Status: ACTIVE | Noted: 2019-05-06

## 2022-03-20 PROBLEM — M79.7 FIBROMYALGIA: Status: ACTIVE | Noted: 2019-05-06

## 2022-03-20 PROBLEM — F11.99 OPIOID USE, UNSPECIFIED WITH UNSPECIFIED OPIOID-INDUCED DISORDER (HCC): Status: ACTIVE | Noted: 2021-10-01

## 2022-03-20 PROBLEM — M16.10 ARTHRITIS, HIP: Status: ACTIVE | Noted: 2019-05-06

## 2022-03-20 PROBLEM — Z96.651 STATUS POST TOTAL KNEE REPLACEMENT, RIGHT: Status: ACTIVE | Noted: 2021-08-20

## 2022-03-20 PROBLEM — Z98.84 S/P GASTRIC BYPASS: Status: ACTIVE | Noted: 2019-05-06

## 2022-03-20 PROBLEM — E66.811 OBESITY, CLASS I, BMI 30.0-34.9 (SEE ACTUAL BMI): Status: ACTIVE | Noted: 2018-04-17

## 2022-03-20 PROBLEM — E78.5 HYPERLIPIDEMIA: Status: ACTIVE | Noted: 2019-05-06
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
## 2022-03-20 PROBLEM — J44.9 CHRONIC OBSTRUCTIVE PULMONARY DISEASE (HCC): Status: ACTIVE | Noted: 2019-05-06

## 2022-03-20 PROBLEM — Z71.3 DIETARY COUNSELING: Status: ACTIVE | Noted: 2020-01-09

## 2022-03-20 PROBLEM — M81.0 OSTEOPOROSIS: Status: ACTIVE | Noted: 2019-05-06

## 2022-03-20 PROBLEM — E66.9 OBESITY, CLASS I, BMI 30.0-34.9 (SEE ACTUAL BMI): Status: ACTIVE | Noted: 2018-04-17

## 2022-05-23 RX ORDER — VENLAFAXINE HYDROCHLORIDE 150 MG/1
CAPSULE, EXTENDED RELEASE ORAL
Qty: 30 CAPSULE | Refills: 3 | OUTPATIENT
Start: 2022-05-23

## 2022-06-15 RX ORDER — VENLAFAXINE HYDROCHLORIDE 150 MG/1
CAPSULE, EXTENDED RELEASE ORAL
Qty: 30 CAPSULE | OUTPATIENT
Start: 2022-06-15

## 2022-06-20 ENCOUNTER — TELEMEDICINE (OUTPATIENT)
Dept: BEHAVIORAL/MENTAL HEALTH CLINIC | Age: 67
End: 2022-06-20

## 2022-06-20 DIAGNOSIS — F51.01 PRIMARY INSOMNIA: ICD-10-CM

## 2022-06-20 DIAGNOSIS — F41.1 GENERALIZED ANXIETY DISORDER: Primary | ICD-10-CM

## 2022-06-20 DIAGNOSIS — F33.41 RECURRENT MAJOR DEPRESSIVE DISORDER, IN PARTIAL REMISSION (HCC): ICD-10-CM

## 2022-06-20 PROCEDURE — 99214 OFFICE O/P EST MOD 30 MIN: CPT | Performed by: PSYCHIATRY & NEUROLOGY

## 2022-06-20 PROCEDURE — 1123F ACP DISCUSS/DSCN MKR DOCD: CPT | Performed by: PSYCHIATRY & NEUROLOGY

## 2022-06-20 RX ORDER — VENLAFAXINE HYDROCHLORIDE 150 MG/1
150 CAPSULE, EXTENDED RELEASE ORAL DAILY
Qty: 30 CAPSULE | Refills: 2 | Status: SHIPPED | OUTPATIENT
Start: 2022-06-20 | End: 2022-09-21 | Stop reason: SDUPTHER

## 2022-06-20 RX ORDER — ZOLPIDEM TARTRATE 10 MG/1
10 TABLET ORAL NIGHTLY PRN
Qty: 30 TABLET | Refills: 2 | Status: SHIPPED | OUTPATIENT
Start: 2022-06-20 | End: 2022-09-21 | Stop reason: SDUPTHER

## 2022-06-20 RX ORDER — ALPRAZOLAM 0.25 MG/1
0.25 TABLET ORAL 2 TIMES DAILY PRN
Qty: 60 TABLET | Refills: 2 | Status: SHIPPED | OUTPATIENT
Start: 2022-06-20 | End: 2022-09-21 | Stop reason: SDUPTHER

## 2022-06-20 RX ORDER — ALENDRONATE SODIUM 70 MG/1
70 TABLET ORAL
COMMUNITY
Start: 2022-06-07

## 2022-06-20 RX ORDER — VENLAFAXINE HYDROCHLORIDE 150 MG/1
CAPSULE, EXTENDED RELEASE ORAL
COMMUNITY
Start: 2022-05-22 | End: 2022-06-20 | Stop reason: SDUPTHER

## 2022-06-20 RX ORDER — ALPRAZOLAM 0.25 MG/1
TABLET ORAL
COMMUNITY
Start: 2022-05-24 | End: 2022-06-20 | Stop reason: SDUPTHER

## 2022-06-20 RX ORDER — ZOLPIDEM TARTRATE 10 MG/1
TABLET ORAL
COMMUNITY
Start: 2022-05-24 | End: 2022-06-20 | Stop reason: SDUPTHER

## 2022-06-20 ASSESSMENT — ANXIETY QUESTIONNAIRES
GAD7 TOTAL SCORE: 10
3. WORRYING TOO MUCH ABOUT DIFFERENT THINGS: 1
7. FEELING AFRAID AS IF SOMETHING AWFUL MIGHT HAPPEN: 1
2. NOT BEING ABLE TO STOP OR CONTROL WORRYING: 3
IF YOU CHECKED OFF ANY PROBLEMS ON THIS QUESTIONNAIRE, HOW DIFFICULT HAVE THESE PROBLEMS MADE IT FOR YOU TO DO YOUR WORK, TAKE CARE OF THINGS AT HOME, OR GET ALONG WITH OTHER PEOPLE: NOT DIFFICULT AT ALL
5. BEING SO RESTLESS THAT IT IS HARD TO SIT STILL: 1
1. FEELING NERVOUS, ANXIOUS, OR ON EDGE: 1
4. TROUBLE RELAXING: 0
6. BECOMING EASILY ANNOYED OR IRRITABLE: 3

## 2022-06-20 ASSESSMENT — PATIENT HEALTH QUESTIONNAIRE - PHQ9
7. TROUBLE CONCENTRATING ON THINGS, SUCH AS READING THE NEWSPAPER OR WATCHING TELEVISION: 0
6. FEELING BAD ABOUT YOURSELF - OR THAT YOU ARE A FAILURE OR HAVE LET YOURSELF OR YOUR FAMILY DOWN: 0
SUM OF ALL RESPONSES TO PHQ QUESTIONS 1-9: 4
5. POOR APPETITE OR OVEREATING: 1
SUM OF ALL RESPONSES TO PHQ9 QUESTIONS 1 & 2: 1
1. LITTLE INTEREST OR PLEASURE IN DOING THINGS: 0
9. THOUGHTS THAT YOU WOULD BE BETTER OFF DEAD, OR OF HURTING YOURSELF: 0
3. TROUBLE FALLING OR STAYING ASLEEP: 0
4. FEELING TIRED OR HAVING LITTLE ENERGY: 1
10. IF YOU CHECKED OFF ANY PROBLEMS, HOW DIFFICULT HAVE THESE PROBLEMS MADE IT FOR YOU TO DO YOUR WORK, TAKE CARE OF THINGS AT HOME, OR GET ALONG WITH OTHER PEOPLE: 0
2. FEELING DOWN, DEPRESSED OR HOPELESS: 1
SUM OF ALL RESPONSES TO PHQ QUESTIONS 1-9: 4
8. MOVING OR SPEAKING SO SLOWLY THAT OTHER PEOPLE COULD HAVE NOTICED. OR THE OPPOSITE, BEING SO FIGETY OR RESTLESS THAT YOU HAVE BEEN MOVING AROUND A LOT MORE THAN USUAL: 1

## 2022-06-20 NOTE — PROGRESS NOTES
Patient:  Yolis Jackman  Age:  78 y.o.  :  1955     SEX:  female MRN:  [id number]     RACE: [unfilled]     SEEN:  [x]  PATIENT  []  SPOUSE []  OTHER:              PHQ-9  2022 3/16/2022 2021   Little interest or pleasure in doing things 0 - -   Little interest or pleasure in doing things - 1 1   Feeling down, depressed, or hopeless 1 - -   Trouble falling or staying asleep, or sleeping too much 0 - -   Trouble falling or staying asleep, or sleeping too much - 0 3   Feeling tired or having little energy 1 - -   Feeling tired or having little energy - 3 1   Poor appetite or overeating 1 - -   Poor appetite, weight loss, or overeating - 0 1   Feeling bad about yourself - or that you are a failure or have let yourself or your family down 0 - -   Feeling bad about yourself - or that you are a failure or have let yourself or your family down - 0 0   Trouble concentrating on things, such as reading the newspaper or watching television 0 - -   Trouble concentrating on things such as school, work, reading, or watching TV - 0 1   Moving or speaking so slowly that other people could have noticed. Or the opposite - being so fidgety or restless that you have been moving around a lot more than usual 1 - -   Moving or speaking so slowly that other people could have noticed; or the opposite being so fidgety that others notice - 0 0   Thoughts that you would be better off dead, or of hurting yourself in some way 0 - -   Thoughts of being better off dead, or hurting yourself in some way - 0 0   PHQ-2 Score 1 - -   Total Score PHQ 2 - 2 1   PHQ-9 Total Score 4 - -   PHQ 9 Score - 5 7   If you checked off any problems, how difficult have these problems made it for you to do your work, take care of things at home, or get along with other people?  0 - -   How difficult have these problems made it for you to do your work, take care of your home and get along with others - Not difficult at all Not difficult at all       NIALL-7 SCREENING 6/20/2022 3/16/2022 12/16/2021   Feeling nervous, anxious, or on edge Several days - -   Not being able to stop or control worrying Nearly every day - -   Worrying too much about different things Several days - -   Trouble relaxing Not at all - -   Being so restless that it is hard to sit still Several days - -   Becoming easily annoyed or irritable Nearly every day - -   Feeling afraid as if something awful might happen Several days - -   NIALL-7 Total Score 10 - -   How difficult have these problems made it for you to do your work, take care of things at home, or get along with other people? Not difficult at all Not difficult at all Not difficult at all   Feeling nervous, anxious, or on edge - Not at all Not at all   NIALL-7 Total Score - 4 9        I was at home while conducting this encounter. Consent:  She and/or her healthcare decision maker is aware that this patient-initiated Telehealth encounter is a billable service, with coverage as determined by her insurance carrier. She is aware that she may receive a bill and has provided verbal consent to proceed: YesPatient identification was verified, and a caregiver was present when appropriate. The patient was located in a state where the provider was credentialed to provide care. This virtual visit was conducted via [employer]. Pursuant to the emergency declaration under the [facility], [facility] waiver authority and the [employer] Act, this Virtual  Visit was conducted to reduce the patient's risk of exposure to COVID-19 and provide continuity of care for an established patient. Services were provided through a video synchronous discussion virtually to substitute for in-person clinic visit. Due to this being a TeleHealth evaluation, many elements of the physical examination are unable to be assessed. Total Time: minutes: 11-20 minutes.     Chief complaint:  Pt says she is doing well on the current medications. Subjective:  Seen virtually for follow-up. States doing well on the current medications. Lives by herself in 1 bedroom apartment. Daughter lives 15 minutes away and her sister lives 10 minutes away. Her daughter is visiting her now from [facility]. She has a dog. Gets along well with her family. Supportive psychotherapy provided. She denies suicidal ideation/homicidal ideations. Denies symptoms psychosis. Again informed her on risk of falls and sedation on Xanax and Ambien. Advised her to make sure she is not dizzy or drowsy before she does anything that requires for her to be alert like driving. She denies side effects from the medications.     Patient Active Problem List   Diagnosis    Hot flashes    LEE (obstructive sleep apnea)    Pulmonary sarcoidosis ([facility]    History of pulmonary embolism    Medicare annual wellness visit, subsequent    Celiac disease    Recurrent major depressive disorder ([facility] [facility]    Closed fracture of greater tuberosity of right humerus    Liver cyst    Tear of right rotator cuff    Cervicalgia    Left shoulder pain    Impaired fasting glucose    RLS (restless legs syndrome)    Allergic rhinitis    Lumbago    Tarsal tunnel syndrome    Acute seasonal allergic rhinitis due to pollen    IBS (irritable bowel syndrome)    History of pulmonary embolus (PE)    Preoperative clearance    Nonallopathic lesion of sacral region    Osteoarthritis    RA (rheumatoid arthritis) (HCC)    Malaise and fatigue    ANTHONY (iron deficiency anemia)    Environmental allergies    Acute right-sided low back pain with sciatica    Sinusitis    Noncompliance with CPAP treatment    Primary insomnia    Radiculopathy affecting upper extremity    Dysthymic disorder    Mixed hyperlipidemia    Other speech disturbances    ACP (advance care planning)    Generalized anxiety disorder    Lesion of liver    Asthma    Arthritis of right knee    Osteoarthritis of right acromioclavicular joint    Gastroesophageal reflux disease    History of DVT (deep vein thrombosis)    Left elbow pain    Contusion of left shoulder    Fall    Hypertension    Chronic obstructive pulmonary disease (HCC)    Dietary counseling    Pulmonary hypertension, mild (HCC)    Fibromyalgia    Arthritis, hip    Opioid use, unspecified with unspecified opioid-induced disorder ([facility] [address] [facility]    BMI 30.0-30.9,adult    Obesity, Class I, BMI 30.0-34.9 (see actual BMI)    Hyperlipidemia    S/P gastric bypass    Osteoporosis    Status post total knee replacement, right     Denies palpitation,SOB, Chest pain, headaches. In no acute distress. MEDICATION REVIEW:    Current Medications:    Current Outpatient Medications   Medication Sig    alendronate (FOSAMAX) 70 MG tablet     venlafaxine (EFFEXOR XR) 150 MG extended release capsule Take 1 capsule by mouth daily    zolpidem (AMBIEN) 10 MG tablet Take 1 tablet by mouth nightly as needed for Sleep for up to 90 days.  ALPRAZolam (XANAX) 0.25 MG tablet Take 1 tablet by mouth 2 times daily as needed for Sleep for up to 90 days. No current facility-administered medications for this visit. Allergies   Allergen Reactions    Codeine Hives       Past Medical History, Past Surgical History, Family history, Social History, and Medications were all reviewed with the patient today and updated as necessary.      Compliant with medication: Yes   Side effects from medications:  No     EXAMINATION  Musculoskeletal    GAIT AND STATION   [x] WNL   [] RESTRICTED   [] UNSTEADY WALK        [] ABNORMAL   [] UNBALANCED         PSYCHIATRIC     GENERAL APPEARANCE:   [x]  WELL GROOMED []     DISHEVELED   []  UNKEMPT      []  UNUSUAL/BIZZARE    [] WNL       ATTITUDE:   [x] COOPERATIVE   [] GUARDED   [] SUSPICIOUS      [] HOSTILE                            BEHAVIOR:   [x] CALM   [] HYPERACTIVE   [] MANNERISMS      [] [] FLACCID   [] COGWHEEL         Diagnoses/Impressions:    ICD-10-CM    1. Generalized anxiety disorder  F41.1 ALPRAZolam (XANAX) 0.25 MG tablet   2. Primary insomnia  F51.01 zolpidem (AMBIEN) 10 MG tablet   3. Recurrent major depressive disorder, in partial remission ([facility] [address]  F33.41        TREATMENT GOALS:  Symptom reduction, Medication adherence, maintain therapeutic gains    LABS/IMAGING:    []  Ordered [x]  Reviewed []  New Labs Ordered:     LAB  WBC   Date/Time Value Ref Range Status   08/21/2021 03:19 AM 10.8 4.3 - 11.1 K/uL Final     Hemoglobin   Date/Time Value Ref Range Status   08/21/2021 03:19 AM 10.5 (L) 11.7 - 15.4 g/dL Final     Hematocrit   Date/Time Value Ref Range Status   08/21/2021 03:19 AM 32.1 (L) 35.8 - 46.3 % Final     Platelets   Date/Time Value Ref Range Status   08/21/2021 03:19  150 - 450 K/uL Final     Sodium   Date/Time Value Ref Range Status   08/03/2021 11:50  136 - 145 mmol/L Final     Potassium   Date/Time Value Ref Range Status   08/03/2021 11:50 AM 3.8 3.5 - 5.1 mmol/L Final     Chloride   Date/Time Value Ref Range Status   08/03/2021 11:50  98 - 107 mmol/L Final     CO2   Date/Time Value Ref Range Status   08/03/2021 11:50 AM 29 21 - 32 mmol/L Final     BUN   Date/Time Value Ref Range Status   08/03/2021 11:50 AM 10 8 - 23 MG/DL Final     ALT   Date/Time Value Ref Range Status   04/29/2021 09:12 AM 28 12 - 65 U/L Final     AST   Date/Time Value Ref Range Status   04/29/2021 09:12 AM 29 15 - 37 U/L Final       Please refer to the lab tab in the epic and care everywhere for the most recent lab results. Plan:     [x]  Medication ordered: Effexor, Ambien, Xanax to target depression, anxiety, insomnia.     [x]  Medication education/counseling provided  Medication dosage and time to take, purpose/expected benefits/risks, common side effects, lab monitoring required and reason, expected length of treatment, risk of no treatment, effects on pregnancy/nursing, financial availability. Educated patient on  side effects/risks/benefits of meds including  cardiac arrhythmias, suicidal ideations,  orthostatic hypotension, serotonin syndrome, risk of susy/hypomania from antidepressants, withdrawals from abrupt discontinuation of meds,  risk of bleeding, risk of seizures, addiction potential, memory impairment with long term use of benzos, respiratory depression, complex sleep behaviors on Ambien including sleepwalking, driving while asleep, making phone calls while asleep, preparing and eating food while asleep, high blood pressure, dizziness, drowsiness, sedation , risk of falls, Risk & benefits discussed: including but not limited to possible off-label medication usage. [x] Follow MSE for sxs improvement     I have reviewed the patients controlled substance prescription history, as maintained in the [address] [place] [address] prescription monitoring program, so that the prescription(s) for a  controlled substance can be given. Recommendations and Referrals: Follow up with : MD, requires monitoring of response to medication, requires monitoring of medication side effects. Time until next PMA:     Follow up with Mental Health Clinicians: psychotherapy interventions, improve level of functioning, monitoring to prevent decompensation /hospitalization, monitoring to maintain therapeutic gains, symptoms (resolving and controlled)           Psychotherapy note:                                __15_ Minutes of psychotherapy     [x]  Supportive psychotherapy, Patient discussed certain situational and personal stressors ongoing in her life at this time, weight management d/w the patient. Sleep hygiene d/w patient. Patient allowed to vent out her emotions. Scenarios were reviewed using role playing and CBT techniques in order to increase insight and decrease anxiety.        []  Disposition planning      []  Dangerous and will not contract for safety in the community    **Pateint has been notified: They are to call 911 or go to their nearest E.R. if they are experiencing a medical emergency or suicidal ideations/homicidal ideations. **  All ancillary documentation entered reviewed by provider. PLEASE NOTE:  This document has been produced in part or whole using voice recognition software. Proofread however unrecognized errors in transcription may be present.         Remi Reddy MD

## 2022-07-11 RX ORDER — ALENDRONATE SODIUM 70 MG/1
TABLET ORAL
Qty: 12 TABLET | OUTPATIENT
Start: 2022-07-11

## 2022-07-18 PROBLEM — Z71.3 DIETARY COUNSELING: Status: ACTIVE | Noted: 2022-07-18

## 2022-07-18 PROBLEM — Z00.00 MEDICARE ANNUAL WELLNESS VISIT, SUBSEQUENT: Status: ACTIVE | Noted: 2022-07-18

## 2022-07-19 RX ORDER — ALENDRONATE SODIUM 70 MG/1
TABLET ORAL
Qty: 12 TABLET | OUTPATIENT
Start: 2022-07-19

## 2022-07-19 RX ORDER — ATORVASTATIN CALCIUM 80 MG/1
TABLET, FILM COATED ORAL
Qty: 90 TABLET | Refills: 1 | OUTPATIENT
Start: 2022-07-19

## 2022-07-27 ENCOUNTER — APPOINTMENT (OUTPATIENT)
Dept: GENERAL RADIOLOGY | Age: 67
End: 2022-07-27
Payer: MEDICARE

## 2022-07-27 ENCOUNTER — HOSPITAL ENCOUNTER (EMERGENCY)
Age: 67
Discharge: HOME | End: 2022-07-27
Attending: EMERGENCY MEDICINE
Payer: MEDICARE

## 2022-07-27 VITALS
WEIGHT: 183 LBS | HEIGHT: 65 IN | BODY MASS INDEX: 30.49 KG/M2 | SYSTOLIC BLOOD PRESSURE: 118 MMHG | TEMPERATURE: 98.8 F | RESPIRATION RATE: 18 BRPM | DIASTOLIC BLOOD PRESSURE: 60 MMHG | HEART RATE: 94 BPM | OXYGEN SATURATION: 100 %

## 2022-07-27 DIAGNOSIS — G89.29 CHRONIC LEFT SHOULDER PAIN: Primary | ICD-10-CM

## 2022-07-27 DIAGNOSIS — M25.512 CHRONIC LEFT SHOULDER PAIN: Primary | ICD-10-CM

## 2022-07-27 PROCEDURE — 99283 EMERGENCY DEPT VISIT LOW MDM: CPT

## 2022-07-27 PROCEDURE — 73030 X-RAY EXAM OF SHOULDER: CPT

## 2022-07-27 RX ORDER — MELOXICAM 7.5 MG/1
7.5 TABLET ORAL DAILY
Qty: 30 TABLET | Refills: 0 | Status: SHIPPED | OUTPATIENT
Start: 2022-07-27 | End: 2022-09-29 | Stop reason: ALTCHOICE

## 2022-07-27 ASSESSMENT — ENCOUNTER SYMPTOMS
SHORTNESS OF BREATH: 0
BACK PAIN: 0
ABDOMINAL DISTENTION: 0

## 2022-07-27 ASSESSMENT — PAIN SCALES - GENERAL: PAINLEVEL_OUTOF10: 5

## 2022-07-27 ASSESSMENT — PAIN - FUNCTIONAL ASSESSMENT: PAIN_FUNCTIONAL_ASSESSMENT: 0-10

## 2022-07-27 NOTE — ED PROVIDER NOTES
[facility] Emergency Department Provider Note                   PCP:                Nina Washington MD               Age: 78 y.o. Sex: female       ICD-10-CM    1. Chronic left shoulder pain  M25.512     G89.29           DISPOSITION Decision To Discharge 07/27/2022 03:15:42 PM        MDM  Number of Diagnoses or Management Options  Chronic left shoulder pain: new, needed workup  Diagnosis management comments: 71-year-old female who presents emergency department today with complaint of left shoulder pain that has been ongoing for about 8 months after a fall in November. Physical exam is unremarkable today. Patient appears to have full range of motion in the shoulder. X-ray shows some degenerative changes but is otherwise unremarkable. Suspicious for arthritis type pain as patient reports a history of arthritis and states this pain is similar. Will discharge with Mobic. I have discussed the results of all labs, procedures, radiographs, and/or treatments with the patient and available family members. Treatment plan is agreed upon by the patient and the patient is ready for discharge. Questions about treatment in the ED and differential diagnosis of presenting condition were answered. Patient was given verbal discharge instructions including, but not limited to, importance of returning to the emergency department for any concern of worsening or continued symptoms. Instructions were given to follow-up with a primary care provider or specialist within 1 to 2 days. Adverse effects of medications, if prescribed, were discussed and the patient was advised to refrain from significant physical activity until followed up by a primary care physician and to not drive or operate heavy machinery after taking any sedating substances.       Risk of Complications, Morbidity, and/or Mortality  Presenting problems: low  Diagnostic procedures: low  Management options: low    Patient Progress  Patient progress: improved Orders Placed This Encounter   Procedures    XR SHOULDER LEFT (MIN 2 VIEWS)        Mauro Carrillo is a 78 y.o. female who presents to the Emergency Department with chief complaint of    Chief Complaint   Patient presents with    Shoulder Pain      48-year-old female with a history of arthritis, asthma, depression, GERD, hypertension, rheumatoid arthritis, and sarcoidosis who presents emergency department today with complaint of left shoulder pain. Patient states that in August she had right knee surgery and was still having a little difficulty moving around. She states that in November she tripped over a rug and fell on her back and left shoulder. She states she has had pain in the shoulder since this fall. She states that she never did get her shoulder checked or x-rayed. The pain is worse with movement. She has been taking ibuprofen with some relief but states she has to take the ibuprofen every 4-6 hours. She denies any chest pain, abdominal pain, or shortness of breath. The pain does not radiate. The history is provided by the patient. Shoulder Problem  Location:  Shoulder  Shoulder location:  L shoulder  Pain details:     Quality:  Aching    Radiates to:  Does not radiate    Severity:  Mild    Duration:  8 months    Timing:  Intermittent    Progression:  Unchanged  Handedness:  Right-handed  Relieved by:  NSAIDs  Worsened by: Movement  Ineffective treatments:  None tried  Associated symptoms: no back pain, no decreased range of motion, no fever, no muscle weakness, no neck pain, no numbness, no swelling and no tingling        Review of Systems   Constitutional:  Negative for fever. Respiratory:  Negative for shortness of breath. Cardiovascular:  Negative for chest pain. Gastrointestinal:  Negative for abdominal distention. Musculoskeletal:  Positive for arthralgias. Negative for back pain and neck pain. All other systems reviewed and are negative.     Past Medical History:  OTHER SURGICAL HISTORY  04/13/2018    VENA CAVA FILTER INSERTION     OTHER SURGICAL HISTORY  05/14/2018    VENA CAVA FILTER REMOVAL     ROTATOR CUFF REPAIR Right 2018    TONSILLECTOMY      as a child    TOTAL KNEE ARTHROPLASTY Right 08/19/2021        Family History   Problem Relation Age of Onset    Other Other         fam hx of liver    Cancer Sister         pancreatic    Cancer Other         lung     Colon Cancer Other     Cancer Father         colon ca    Diabetes Father     Pneumonia Mother     Breast Cancer Neg Hx         Social History     Socioeconomic History    Marital status:      Spouse name: None    Number of children: None    Years of education: None    Highest education level: None   Tobacco Use    Smoking status: Never    Smokeless tobacco: Never   Vaping Use    Vaping Use: Never used   Substance and Sexual Activity    Alcohol use: Yes     Alcohol/week: 11.0 standard drinks    Drug use: Not Currently     Types: Marijuana (Weed)         Codeine     Discharge Medication List as of 7/27/2022  3:16 PM        CONTINUE these medications which have NOT CHANGED    Details   alendronate (FOSAMAX) 70 MG tablet Historical Med      venlafaxine (EFFEXOR XR) 150 MG extended release capsule Take 1 capsule by mouth daily, Disp-30 capsule, R-2Normal      zolpidem (AMBIEN) 10 MG tablet Take 1 tablet by mouth nightly as needed for Sleep for up to 90 days. , Disp-30 tablet, R-2Normal      ALPRAZolam (XANAX) 0.25 MG tablet Take 1 tablet by mouth 2 times daily as needed for Sleep for up to 90 days. , Disp-60 tablet, R-2Normal              Vitals signs and nursing note reviewed. Patient Vitals for the past 4 hrs:   Temp Pulse Resp BP SpO2   07/27/22 1408 98.8 °F (37.1 °C) 94 18 118/60 100 %          Physical Exam  Vitals and nursing note reviewed. Constitutional:       General: She is not in acute distress. Appearance: Normal appearance. She is well-developed.  She is not ill-appearing, toxic-appearing or diaphoretic. HENT:      Head: Normocephalic and atraumatic. Mouth/Throat:      Mouth: Mucous membranes are moist.   Eyes:      General: No scleral icterus. Extraocular Movements: Extraocular movements intact. Cardiovascular:      Rate and Rhythm: Normal rate. Pulmonary:      Effort: Pulmonary effort is normal. No respiratory distress. Abdominal:      General: Abdomen is flat. There is no distension. Musculoskeletal:         General: Normal range of motion. Left shoulder: Tenderness present. No swelling, deformity, bony tenderness or crepitus. Normal range of motion. Normal strength. Arms:       Comments: Tenderness with palpation to posterior left shoulder. There is no swelling or deformity. Patient exhibits full range of motion of the shoulder but reports pain with active range of motion. Patient appears to have good, equal strength in both shoulders. She is able to pronate and supinate the left hand without issue. No difficulty with \"empty can\" test.    Skin:     General: Skin is warm and dry. Capillary Refill: Capillary refill takes less than 2 seconds. Neurological:      General: No focal deficit present. Mental Status: She is alert and oriented to person, place, and time. Psychiatric:         Mood and Affect: Mood normal.         Behavior: Behavior normal.        Procedures      Labs Reviewed - No data to display     XR SHOULDER LEFT (MIN 2 VIEWS)   Final Result      No acute osseous abnormality. ED Course as of 07/27/22 1528   Wed Jul 27, 2022   1512 XR SHOULDER LEFT (MIN 2 VIEWS)  IMPRESSION:     No acute osseous abnormality. [BC]      ED Course User Index  [BC] KARLENE Cormier - TONG        Voice dictation software was used during the making of this note. This software is not perfect and grammatical and other typographical errors may be present. This note has not been completely proofread for errors.        Garima Sarkar

## 2022-07-27 NOTE — ED NOTES
I have reviewed discharge instructions with the patient. The patient verbalized understanding. Patient left ED via Discharge Method: ambulatory to Home with self. Opportunity for questions and clarification provided. Patient given 1 scripts. To continue your aftercare when you leave the hospital, you may receive an automated call from our care team to check in on how you are doing. This is a free service and part of our promise to provide the best care and service to meet your aftercare needs.  If you have questions, or wish to unsubscribe from this service please call [phone]. Thank you for Choosing our [facility] Emergency Department.         Chrissy Leon RN  07/27/22 0790

## 2022-07-27 NOTE — DISCHARGE INSTRUCTIONS
As we discussed, your x-ray showed some degeneration in your shoulder, which is normal. It was otherwise fine. Take medication as prescribed. This is likely arthritis pain. Shoulder exercises and over-the-counter Voltaren gel may also be helpful. Follow-up with your primary care provider. Return to the ER for any new, worsening, or concerning symptoms.

## 2022-08-05 ENCOUNTER — TELEPHONE (OUTPATIENT)
Dept: PRIMARY CARE CLINIC | Facility: CLINIC | Age: 67
End: 2022-08-05

## 2022-08-05 NOTE — TELEPHONE ENCOUNTER
----- Message from Boo Vladimirjesenia sent at 8/2/2022  2:39 PM EDT -----  Subject: Message to Provider    QUESTIONS  Information for Provider? Please contact her in regards to test results   07/25.  ---------------------------------------------------------------------------  --------------  Leo Chambers INFO  [phone]; OK to leave message on voicemail  ---------------------------------------------------------------------------  --------------  SCRIPT ANSWERS  Relationship to Patient?  Self

## 2022-08-05 NOTE — TELEPHONE ENCOUNTER
Returned call to patient, a message was send to  under the correct/most recent patient's chart.    Nat Conteh

## 2022-08-17 PROBLEM — Z00.00 MEDICARE ANNUAL WELLNESS VISIT, SUBSEQUENT: Status: RESOLVED | Noted: 2022-07-18 | Resolved: 2022-08-17

## 2022-08-25 RX ORDER — ATORVASTATIN CALCIUM 80 MG/1
TABLET, FILM COATED ORAL
Qty: 90 TABLET | Refills: 1 | OUTPATIENT
Start: 2022-08-25

## 2022-08-25 RX ORDER — ALENDRONATE SODIUM 70 MG/1
TABLET ORAL
Qty: 12 TABLET | OUTPATIENT
Start: 2022-08-25

## 2022-09-08 RX ORDER — ATORVASTATIN CALCIUM 80 MG/1
TABLET, FILM COATED ORAL
Qty: 90 TABLET | Refills: 1 | OUTPATIENT
Start: 2022-09-08

## 2022-09-08 RX ORDER — ALENDRONATE SODIUM 70 MG/1
TABLET ORAL
Qty: 12 TABLET | OUTPATIENT
Start: 2022-09-08

## 2022-09-20 ENCOUNTER — HOSPITAL ENCOUNTER (EMERGENCY)
Age: 67
Discharge: HOME | End: 2022-09-20
Attending: STUDENT IN AN ORGANIZED HEALTH CARE EDUCATION/TRAINING PROGRAM | Admitting: STUDENT IN AN ORGANIZED HEALTH CARE EDUCATION/TRAINING PROGRAM
Payer: MEDICARE

## 2022-09-20 VITALS
RESPIRATION RATE: 18 BRPM | BODY MASS INDEX: 31.16 KG/M2 | OXYGEN SATURATION: 99 % | DIASTOLIC BLOOD PRESSURE: 72 MMHG | HEART RATE: 92 BPM | WEIGHT: 187 LBS | SYSTOLIC BLOOD PRESSURE: 120 MMHG | HEIGHT: 65 IN | TEMPERATURE: 99.1 F

## 2022-09-20 DIAGNOSIS — J06.9 VIRAL URI WITH COUGH: Primary | ICD-10-CM

## 2022-09-20 LAB
SARS-COV-2 RDRP RESP QL NAA+PROBE: NOT DETECTED
SOURCE: NORMAL

## 2022-09-20 PROCEDURE — 87635 SARS-COV-2 COVID-19 AMP PRB: CPT

## 2022-09-20 PROCEDURE — 99283 EMERGENCY DEPT VISIT LOW MDM: CPT

## 2022-09-20 ASSESSMENT — PAIN DESCRIPTION - LOCATION: LOCATION: GENERALIZED

## 2022-09-20 ASSESSMENT — ENCOUNTER SYMPTOMS
SORE THROAT: 1
SHORTNESS OF BREATH: 0
NAUSEA: 0
VOMITING: 0
PHOTOPHOBIA: 0
DIARRHEA: 0
COUGH: 1

## 2022-09-20 ASSESSMENT — PAIN DESCRIPTION - DESCRIPTORS: DESCRIPTORS: ACHING

## 2022-09-20 ASSESSMENT — PAIN - FUNCTIONAL ASSESSMENT
PAIN_FUNCTIONAL_ASSESSMENT: 0-10
PAIN_FUNCTIONAL_ASSESSMENT: ACTIVITIES ARE NOT PREVENTED
PAIN_FUNCTIONAL_ASSESSMENT: 0-10

## 2022-09-20 ASSESSMENT — PAIN SCALES - GENERAL
PAINLEVEL_OUTOF10: 4
PAINLEVEL_OUTOF10: 4

## 2022-09-20 NOTE — ED PROVIDER NOTES
Emergency Department Provider Note                   PCP:                Josie Jean MD               Age: 75 y.o. Sex: female       ICD-10-CM    1. Viral URI with cough  J06.9           DISPOSITION Decision To Discharge 09/20/2022 12:31:46 PM        MDM  Number of Diagnoses or Management Options  Viral URI with cough  Diagnosis management comments: 69-year-old female presents to the emergency department with URI symptoms that began yesterday. Rapid COVID-negative. Patient has not hypoxic nor in any distress. Appears she feels unwell with her URI. Advised to have repeat COVID-19 test in 2 days if she desires. Advised her to wear a mask when she is exposed to anyone. She will continue taking over-the-counter medications for symptom management. Given strict return precautions. She voiced understanding and agreement with this plan. Amount and/or Complexity of Data Reviewed  Clinical lab tests: ordered and reviewed    Risk of Complications, Morbidity, and/or Mortality  Presenting problems: low  Diagnostic procedures: low  Management options: low               Orders Placed This Encounter   Procedures    COVID-19, Rapid        Medications - No data to display    New Prescriptions    No medications on file        Corene Landau is a 75 y.o. female who presents to the Emergency Department with chief complaint of    Chief Complaint   Patient presents with    Cough     Aches, joint pain, chest congestion      69-year-old female, vaccinated gets COVID-19 presents to the emergency department with URI symptoms that began yesterday. Reports body aches, cough, nonproductive, sore throat and head congestion. Reports keeping her granddaughter recently who was sick with similar symptoms. No known COVID-19 exposure. Has been taking Tylenol and Motrin for body aches. Review of Systems   Constitutional:  Negative for chills and fever. HENT:  Positive for congestion and sore throat.     Eyes: Negative for photophobia. Respiratory:  Positive for cough. Negative for shortness of breath. Cardiovascular:  Negative for chest pain. Gastrointestinal:  Negative for diarrhea, nausea and vomiting. Genitourinary:  Negative for dysuria. Musculoskeletal:  Positive for myalgias. Negative for neck pain and neck stiffness. Skin:  Negative for rash. Neurological:  Negative for syncope and headaches. Psychiatric/Behavioral:  Negative for confusion. All other systems reviewed and are negative.     Past Medical History:   Diagnosis Date    Allergic rhinitis     Anxiety     Arthritis     osteo    Asthma     as a child    Celiac disease     denies    COVID-19 vaccine series completed 3/30/21, 4/29/21    Moderna     Depression     DVT (deep vein thrombosis) in pregnancy 2015    not in pregnancy     Dysthymic disorder     Esophageal reflux     daily med for control     Exophthalmos     Fibromyalgia     GERD (gastroesophageal reflux disease)     prontonix daily     Hiatal hernia     Hot flashes     Hypercholesterolemia     Hyperlipidemia     on med for control     Hypertension     no meds     IBS (irritable bowel syndrome)     ANTHONY (iron deficiency anemia)     HX    Impaired fasting glucose     Liver disease     \"liver cyst\"    Lumbago     Malaise and fatigue     Morbid obesity (HCC)     BMI=32.3    Nonallopathic lesion of sacral region     LEE (obstructive sleep apnea)     no c-pap; not since weight loss     Osteoarthritis     Osteoporosis     Osteoporosis     Other speech disturbances     PUD (peptic ulcer disease)     denies    Pulmonary embolism ([facility] [address] 2015    took blood thinner for 3 months     Pulmonary hypertension, mild (HCC)     RA (rheumatoid arthritis) (HCC)     RLS (restless legs syndrome)     HX    Sarcoidosis     followed by [facility]     Sciatic neuralgia     Tarsal tunnel syndrome     Urinary incontinence     Wears glasses     Wears partial dentures         Past Surgical History: Procedure Laterality Date    BREAST REDUCTION SURGERY      CARPAL TUNNEL RELEASE Right 1997    2 x    CHOLECYSTECTOMY      GASTRECTOMY  04/17/2018    gastric sleeve     HYSTERECTOMY (CERVIX STATUS UNKNOWN)  1978    IR IVC FILTER PLACEMENT W IMAGING  4/13/2018    IR IVC FILTER PLACEMENT W IMAGING 4/13/2018 D RADIOLOGY SPECIALS    IR IVC FILTER RETRIEVAL  5/14/2018    IR IVC FILTER RETRIEVAL 5/14/2018 D RADIOLOGY SPECIALS    ORTHOPEDIC SURGERY      right knee, right toe, right elbow     OTHER SURGICAL HISTORY  04/13/2018    VENA CAVA FILTER INSERTION     OTHER SURGICAL HISTORY  05/14/2018    VENA CAVA FILTER REMOVAL     ROTATOR CUFF REPAIR Right 2018    TONSILLECTOMY      as a child    TOTAL KNEE ARTHROPLASTY Right 08/19/2021        Family History   Problem Relation Age of Onset    Other Other         fam hx of liver    Cancer Sister         pancreatic    Cancer Other         lung     Colon Cancer Other     Cancer Father         colon ca    Diabetes Father     Pneumonia Mother     Breast Cancer Neg Hx         Social History     Socioeconomic History    Marital status:    Tobacco Use    Smoking status: Never    Smokeless tobacco: Never   Vaping Use    Vaping Use: Never used   Substance and Sexual Activity    Alcohol use: Yes     Alcohol/week: 11.0 standard drinks    Drug use: Not Currently     Types: Marijuana [place])         Codeine     Previous Medications    ALENDRONATE (FOSAMAX) 70 MG TABLET        MELOXICAM (MOBIC) 7.5 MG TABLET    Take 1 tablet by mouth in the morning. VENLAFAXINE (EFFEXOR XR) 150 MG EXTENDED RELEASE CAPSULE    Take 1 capsule by mouth daily        Vitals signs and nursing note reviewed. Patient Vitals for the past 4 hrs:   Temp Pulse Resp BP SpO2   09/20/22 1136 99 °F (37.2 °C) 94 17 124/74 100 %          Physical Exam  Vitals and nursing note reviewed. Constitutional:       General: She is not in acute distress. Appearance: Normal appearance.    HENT:      Head:

## 2022-09-20 NOTE — DISCHARGE INSTRUCTIONS
Alternate Tylenol and Motrin as needed for fever and body aches if you develop them. Use over-the-counter Mucinex as needed for cough and congestion. Wear a mask when around anyone. Follow-up with primary care physician as needed. Turn to the ER for worsening or worrisome symptoms.

## 2022-09-20 NOTE — ED TRIAGE NOTES
Patient has productive cough, nasal congestion, headache, joint ache for past 2 days. Patient has taken Nyquil and Ibuprofen for s/s.

## 2022-09-21 ENCOUNTER — TELEPHONE (OUTPATIENT)
Dept: PRIMARY CARE CLINIC | Facility: CLINIC | Age: 67
End: 2022-09-21

## 2022-09-21 ENCOUNTER — TELEMEDICINE (OUTPATIENT)
Dept: BEHAVIORAL/MENTAL HEALTH CLINIC | Age: 67
End: 2022-09-21
Payer: MEDICARE

## 2022-09-21 DIAGNOSIS — F33.41 RECURRENT MAJOR DEPRESSIVE DISORDER, IN PARTIAL REMISSION (HCC): Primary | ICD-10-CM

## 2022-09-21 DIAGNOSIS — F51.01 PRIMARY INSOMNIA: ICD-10-CM

## 2022-09-21 DIAGNOSIS — F41.1 GENERALIZED ANXIETY DISORDER: ICD-10-CM

## 2022-09-21 PROBLEM — M81.0 OSTEOPOROSIS: Status: ACTIVE | Noted: 2022-09-21

## 2022-09-21 PROCEDURE — G8427 DOCREV CUR MEDS BY ELIG CLIN: HCPCS | Performed by: PSYCHIATRY & NEUROLOGY

## 2022-09-21 PROCEDURE — 3017F COLORECTAL CA SCREEN DOC REV: CPT | Performed by: PSYCHIATRY & NEUROLOGY

## 2022-09-21 PROCEDURE — 1090F PRES/ABSN URINE INCON ASSESS: CPT | Performed by: PSYCHIATRY & NEUROLOGY

## 2022-09-21 PROCEDURE — 99214 OFFICE O/P EST MOD 30 MIN: CPT | Performed by: PSYCHIATRY & NEUROLOGY

## 2022-09-21 PROCEDURE — 1123F ACP DISCUSS/DSCN MKR DOCD: CPT | Performed by: PSYCHIATRY & NEUROLOGY

## 2022-09-21 PROCEDURE — G8399 PT W/DXA RESULTS DOCUMENT: HCPCS | Performed by: PSYCHIATRY & NEUROLOGY

## 2022-09-21 RX ORDER — VENLAFAXINE HYDROCHLORIDE 150 MG/1
150 CAPSULE, EXTENDED RELEASE ORAL DAILY
Qty: 30 CAPSULE | Refills: 2 | Status: SHIPPED | OUTPATIENT
Start: 2022-09-21

## 2022-09-21 RX ORDER — ATORVASTATIN CALCIUM 80 MG/1
80 TABLET, FILM COATED ORAL DAILY
COMMUNITY
Start: 2022-06-28 | End: 2022-09-29

## 2022-09-21 RX ORDER — SIMVASTATIN 40 MG
TABLET ORAL
COMMUNITY

## 2022-09-21 RX ORDER — ZOLPIDEM TARTRATE 10 MG/1
10 TABLET ORAL NIGHTLY PRN
Qty: 30 TABLET | Refills: 2 | Status: SHIPPED | OUTPATIENT
Start: 2022-09-21 | End: 2022-12-20

## 2022-09-21 RX ORDER — ALPRAZOLAM 0.25 MG/1
0.25 TABLET ORAL 2 TIMES DAILY PRN
Qty: 60 TABLET | Refills: 2 | Status: SHIPPED | OUTPATIENT
Start: 2022-09-21 | End: 2022-12-20

## 2022-09-21 RX ORDER — OMEPRAZOLE 40 MG/1
40 CAPSULE, DELAYED RELEASE ORAL DAILY
COMMUNITY
Start: 2022-07-19

## 2022-09-21 ASSESSMENT — PATIENT HEALTH QUESTIONNAIRE - PHQ9
SUM OF ALL RESPONSES TO PHQ QUESTIONS 1-9: 4
8. MOVING OR SPEAKING SO SLOWLY THAT OTHER PEOPLE COULD HAVE NOTICED. OR THE OPPOSITE, BEING SO FIGETY OR RESTLESS THAT YOU HAVE BEEN MOVING AROUND A LOT MORE THAN USUAL: 0
SUM OF ALL RESPONSES TO PHQ QUESTIONS 1-9: 4
SUM OF ALL RESPONSES TO PHQ QUESTIONS 1-9: 4
3. TROUBLE FALLING OR STAYING ASLEEP: 0
10. IF YOU CHECKED OFF ANY PROBLEMS, HOW DIFFICULT HAVE THESE PROBLEMS MADE IT FOR YOU TO DO YOUR WORK, TAKE CARE OF THINGS AT HOME, OR GET ALONG WITH OTHER PEOPLE: 0
2. FEELING DOWN, DEPRESSED OR HOPELESS: 0
SUM OF ALL RESPONSES TO PHQ9 QUESTIONS 1 & 2: 0
4. FEELING TIRED OR HAVING LITTLE ENERGY: 3
5. POOR APPETITE OR OVEREATING: 1
1. LITTLE INTEREST OR PLEASURE IN DOING THINGS: 0
7. TROUBLE CONCENTRATING ON THINGS, SUCH AS READING THE NEWSPAPER OR WATCHING TELEVISION: 0
9. THOUGHTS THAT YOU WOULD BE BETTER OFF DEAD, OR OF HURTING YOURSELF: 0
6. FEELING BAD ABOUT YOURSELF - OR THAT YOU ARE A FAILURE OR HAVE LET YOURSELF OR YOUR FAMILY DOWN: 0
SUM OF ALL RESPONSES TO PHQ QUESTIONS 1-9: 4

## 2022-09-21 ASSESSMENT — ANXIETY QUESTIONNAIRES
IF YOU CHECKED OFF ANY PROBLEMS ON THIS QUESTIONNAIRE, HOW DIFFICULT HAVE THESE PROBLEMS MADE IT FOR YOU TO DO YOUR WORK, TAKE CARE OF THINGS AT HOME, OR GET ALONG WITH OTHER PEOPLE: NOT DIFFICULT AT ALL
7. FEELING AFRAID AS IF SOMETHING AWFUL MIGHT HAPPEN: 1
6. BECOMING EASILY ANNOYED OR IRRITABLE: 1
5. BEING SO RESTLESS THAT IT IS HARD TO SIT STILL: 0
3. WORRYING TOO MUCH ABOUT DIFFERENT THINGS: 1
GAD7 TOTAL SCORE: 6
2. NOT BEING ABLE TO STOP OR CONTROL WORRYING: 3
1. FEELING NERVOUS, ANXIOUS, OR ON EDGE: 0
4. TROUBLE RELAXING: 0

## 2022-09-21 NOTE — TELEPHONE ENCOUNTER
Dr Vee Nacsimento saw patient virtually today. Dr Vee Nascimento advised that the patient has asked for Dr Sergio Georges to contact her regarding cough and URI and requesting something for the cough.

## 2022-09-22 NOTE — TELEPHONE ENCOUNTER
Pt can take ROBITUSSIN DM 10 mi 2-3 times daily as needed for cough   It cough is worse --she may have to be seen-may need abx       Patient was informed.

## 2022-09-29 ENCOUNTER — TELEMEDICINE (OUTPATIENT)
Dept: PRIMARY CARE CLINIC | Facility: CLINIC | Age: 67
End: 2022-09-29
Payer: MEDICARE

## 2022-09-29 ENCOUNTER — TELEPHONE (OUTPATIENT)
Dept: PRIMARY CARE CLINIC | Facility: CLINIC | Age: 67
End: 2022-09-29

## 2022-09-29 DIAGNOSIS — M81.0 OSTEOPOROSIS, UNSPECIFIED OSTEOPOROSIS TYPE, UNSPECIFIED PATHOLOGICAL FRACTURE PRESENCE: ICD-10-CM

## 2022-09-29 DIAGNOSIS — E66.9 OBESITY, UNSPECIFIED CLASSIFICATION, UNSPECIFIED OBESITY TYPE, UNSPECIFIED WHETHER SERIOUS COMORBIDITY PRESENT: ICD-10-CM

## 2022-09-29 DIAGNOSIS — Z12.11 SCREENING FOR COLON CANCER: ICD-10-CM

## 2022-09-29 DIAGNOSIS — J32.9 SINUSITIS, UNSPECIFIED CHRONICITY, UNSPECIFIED LOCATION: ICD-10-CM

## 2022-09-29 DIAGNOSIS — Z00.00 MEDICARE ANNUAL WELLNESS VISIT, SUBSEQUENT: Primary | ICD-10-CM

## 2022-09-29 DIAGNOSIS — Z12.31 SCREENING MAMMOGRAM FOR BREAST CANCER: ICD-10-CM

## 2022-09-29 DIAGNOSIS — J44.9 CHRONIC OBSTRUCTIVE PULMONARY DISEASE, UNSPECIFIED COPD TYPE (HCC): ICD-10-CM

## 2022-09-29 DIAGNOSIS — J02.9 PHARYNGITIS, UNSPECIFIED ETIOLOGY: ICD-10-CM

## 2022-09-29 DIAGNOSIS — E78.5 HYPERLIPIDEMIA, UNSPECIFIED HYPERLIPIDEMIA TYPE: ICD-10-CM

## 2022-09-29 DIAGNOSIS — R09.89 CHEST CONGESTION: ICD-10-CM

## 2022-09-29 DIAGNOSIS — Z71.3 DIETARY COUNSELING AND SURVEILLANCE: ICD-10-CM

## 2022-09-29 PROCEDURE — 1123F ACP DISCUSS/DSCN MKR DOCD: CPT | Performed by: FAMILY MEDICINE

## 2022-09-29 PROCEDURE — G8427 DOCREV CUR MEDS BY ELIG CLIN: HCPCS | Performed by: FAMILY MEDICINE

## 2022-09-29 PROCEDURE — 3023F SPIROM DOC REV: CPT | Performed by: FAMILY MEDICINE

## 2022-09-29 PROCEDURE — 3017F COLORECTAL CA SCREEN DOC REV: CPT | Performed by: FAMILY MEDICINE

## 2022-09-29 PROCEDURE — 1036F TOBACCO NON-USER: CPT | Performed by: FAMILY MEDICINE

## 2022-09-29 PROCEDURE — 1090F PRES/ABSN URINE INCON ASSESS: CPT | Performed by: FAMILY MEDICINE

## 2022-09-29 PROCEDURE — G0439 PPPS, SUBSEQ VISIT: HCPCS | Performed by: FAMILY MEDICINE

## 2022-09-29 PROCEDURE — G8399 PT W/DXA RESULTS DOCUMENT: HCPCS | Performed by: FAMILY MEDICINE

## 2022-09-29 PROCEDURE — 99214 OFFICE O/P EST MOD 30 MIN: CPT | Performed by: FAMILY MEDICINE

## 2022-09-29 PROCEDURE — G8417 CALC BMI ABV UP PARAM F/U: HCPCS | Performed by: FAMILY MEDICINE

## 2022-09-29 RX ORDER — AMOXICILLIN AND CLAVULANATE POTASSIUM 875; 125 MG/1; MG/1
1 TABLET, FILM COATED ORAL 2 TIMES DAILY
Qty: 20 TABLET | Refills: 0 | Status: SHIPPED | OUTPATIENT
Start: 2022-09-29 | End: 2022-10-09

## 2022-09-29 RX ORDER — ACETAMINOPHEN 500 MG
500 TABLET ORAL EVERY 6 HOURS PRN
COMMUNITY

## 2022-09-29 NOTE — TELEPHONE ENCOUNTER
----- Message from Jill Gomez sent at 9/28/2022 12:02 PM EDT -----  Subject: Message to Provider    QUESTIONS  Information for Provider? Pt has a cold and would like to see if something   can be called in for a cold. She has runny nose, chest congestion,   coughing, headaches for 8 days. She would like it sent to [facility]   in [facility], pls give the pt a call.  ---------------------------------------------------------------------------  --------------  Gretel Aquino [facility]  [phone]; OK to leave message on voicemail  ---------------------------------------------------------------------------  --------------  SCRIPT ANSWERS  Relationship to Patient?  Self

## 2022-10-02 PROBLEM — J02.9 PHARYNGITIS: Status: ACTIVE | Noted: 2022-10-02

## 2022-10-02 PROBLEM — Z12.31 SCREENING MAMMOGRAM FOR BREAST CANCER: Status: ACTIVE | Noted: 2022-10-02

## 2022-10-03 NOTE — PATIENT INSTRUCTIONS
Personalized Preventive Plan for Singh Scott - 9/29/2022  Medicare offers a range of preventive health benefits. Some of the tests and screenings are paid in full while other may be subject to a deductible, co-insurance, and/or copay. Some of these benefits include a comprehensive review of your medical history including lifestyle, illnesses that may run in your family, and various assessments and screenings as appropriate. After reviewing your medical record and screening and assessments performed today your provider may have ordered immunizations, labs, imaging, and/or referrals for you. A list of these orders (if applicable) as well as your Preventive Care list are included within your After Visit Summary for your review. Other Preventive Recommendations:    A preventive eye exam performed by an eye specialist is recommended every 1-2 years to screen for glaucoma; cataracts, macular degeneration, and other eye disorders. A preventive dental visit is recommended every 6 months. Try to get at least 150 minutes of exercise per week or 10,000 steps per day on a pedometer . Order or download the FREE \"Exercise & Physical Activity: Your Everyday Guide\" from [employer]. Call 7-541.240.5351 or search [employer] online. You need 3851-8971 mg of calcium and 9402-0969 IU of vitamin D per day. It is possible to meet your calcium requirement with diet alone, but a vitamin D supplement is usually necessary to meet this goal.  When exposed to the sun, use a sunscreen that protects against both UVA and UVB radiation with an SPF of 30 or greater. Reapply every 2 to 3 hours or after sweating, drying off with a towel, or swimming. Always wear a seat belt when traveling in a car. Always wear a helmet when riding a bicycle or motorcycle.

## 2022-10-03 NOTE — PROGRESS NOTES
[facility] - [address] [facility], [facility] Valeriano Valdovinos   Office : [phone]  Fax : [phone]      Medicare Annual Wellness Visit    Gibran Squires is here for Cough (Nasal discharge, headache, body aches and fever; x9 days./Pt had a home covid test and at the hospital both were negative.) and Medicare AWV    Assessment & Plan   Medicare annual wellness visit, subsequent  -     Comprehensive Metabolic Panel; Future  -     CBC with Auto Differential; Future  -     Lipid Panel; Future  -     TSH; Future  -     T4, Free; Future  Sinusitis, unspecified chronicity, unspecified location  -     amoxicillin-clavulanate (AUGMENTIN) 875-125 MG per tablet; Take 1 tablet by mouth 2 times daily for 10 days, Disp-20 tablet, R-0Normal  -     Comprehensive Metabolic Panel; Future  -     CBC with Auto Differential; Future  -     Lipid Panel; Future  -     TSH; Future  -     T4, Free; Future  Pharyngitis, unspecified etiology  -     amoxicillin-clavulanate (AUGMENTIN) 875-125 MG per tablet; Take 1 tablet by mouth 2 times daily for 10 days, Disp-20 tablet, R-0Normal  -     Comprehensive Metabolic Panel; Future  -     CBC with Auto Differential; Future  -     Lipid Panel; Future  -     TSH; Future  -     T4, Free; Future  Chest congestion  -     amoxicillin-clavulanate (AUGMENTIN) 875-125 MG per tablet; Take 1 tablet by mouth 2 times daily for 10 days, Disp-20 tablet, R-0Normal  -     Comprehensive Metabolic Panel; Future  -     CBC with Auto Differential; Future  -     Lipid Panel; Future  -     TSH; Future  -     T4, Free; Future  Screening mammogram for breast cancer  -     [facility] SCREENING W CAD BILATERAL 2 VW; Future  -     Comprehensive Metabolic Panel; Future  -     CBC with Auto Differential; Future  -     Lipid Panel; Future  -     TSH;  Future  -     T4, Free; Future  Osteoporosis, unspecified osteoporosis type, unspecified pathological fracture presence  -     Comprehensive Metabolic Panel; Future  -     CBC with Auto Differential; Future  -     Lipid Panel; Future  -     TSH; Future  -     T4, Free; Future  -     DEXA BONE DENSITY AXIAL SKELETON; Future  Hyperlipidemia, unspecified hyperlipidemia type  -     Comprehensive Metabolic Panel; Future  -     CBC with Auto Differential; Future  -     Lipid Panel; Future  -     TSH; Future  -     T4, Free; Future  Chronic obstructive pulmonary disease, unspecified COPD type ([facility] [address]  -     Comprehensive Metabolic Panel; Future  -     CBC with Auto Differential; Future  -     Lipid Panel; Future  -     TSH; Future  -     T4, Free; Future  Obesity, unspecified classification, unspecified obesity type, unspecified whether serious comorbidity present  -     Comprehensive Metabolic Panel; Future  -     CBC with Auto Differential; Future  -     Lipid Panel; Future  -     TSH; Future  -     T4, Free; Future  Dietary counseling and surveillance  -     Comprehensive Metabolic Panel; Future  -     CBC with Auto Differential; Future  -     Lipid Panel; Future  -     TSH; Future  -     T4, Free; Future  Screening for colon cancer  -     Ambulatory referral to Colonoscopy    Recommendations for Preventive Services Due: see orders and patient instructions/AVS.  Recommended screening schedule for the next 5-10 years is provided to the patient in written form: see Patient Instructions/AVS.     Return in 2 weeks (on 10/13/2022) for Medicare Annual Wellness Visit in 1 year. Subjective       Patient's complete Health Risk Assessment and screening values have been reviewed and are found in Flowsheets. The following problems were reviewed today and where indicated follow up appointments were made and/or referrals ordered.     Positive Risk Factor Screenings with Interventions:    Fall Risk:      Fall Risk Interventions:                General Health and ACP:       Advance Directives       Power of  Living Will ACP-Advance Directive ACP-Power of     Not on File Not on File Not on File Not on File          General Health Risk Interventions:      Health Habits/Nutrition:  Physical Activity: Not on file              Health Habits/Nutrition Interventions:               Objective      Patient-Reported Vitals  No data recorded          Allergies   Allergen Reactions    Codeine Hives     Prior to Visit Medications    Medication Sig Taking? Authorizing Provider   Dextromethorphan-guaiFENesin [facility]\"" DM PO) Take by mouth as needed Yes Historical Provider, MD   acetaminophen (TYLENOL) 500 MG tablet Take 500 mg by mouth every 6 hours as needed for Pain Yes Historical Provider, MD   amoxicillin-clavulanate (AUGMENTIN) 875-125 MG per tablet Take 1 tablet by mouth 2 times daily for 10 days Yes Charlotte Mchugh MD   vitamin D 25 MCG (1000 UT) CAPS Take 1 capsule by mouth daily Yes Historical Provider, MD   omeprazole (PRILOSEC) 40 MG delayed release capsule Take 40 mg by mouth daily Yes Historical Provider, MD   simvastatin (ZOCOR) 40 MG tablet Zocor 40 mg tablet   Take 1 tablet every day by oral route. Yes Historical Provider, MD   venlafaxine (EFFEXOR XR) 150 MG extended release capsule Take 1 capsule by mouth daily Yes Lionel Fernandez MD   zolpidem (AMBIEN) 10 MG tablet Take 1 tablet by mouth nightly as needed for Sleep for up to 90 days. Yes Lionel Fernandez MD   ALPRAZolam Earlie Sicard) 0.25 MG tablet Take 1 tablet by mouth 2 times daily as needed for Sleep for up to 90 days. Yes Lionel Fernandez MD   alendronate (FOSAMAX) 70 MG tablet Take 70 mg by mouth every 7 days Yes Historical Provider, MD Calder[place] (Including outside providers/suppliers regularly involved in providing care):   Patient Care Team:  Charlotte Mchugh MD as PCP - Carolee Grubbs MD as PCP - [facility] Empaneled Provider     Reviewed and updated this visit:  Allergies  Meds  Med Hx  Surg Hx  Soc Hx  Fam Hx          Daph[place], was evaluated through a synchronous (real-time) audio-video encounter.  The patient (or guardian if applicable) is aware that this is a billable service, which includes applicable co-pays. This Virtual Visit was conducted with patient's (and/or legal guardian's) consent. The visit was conducted pursuant to the emergency declaration under the [facility], [facility] authority and the [employer] General Act. Patient identification was verified, and a caregiver was present when appropriate. The patient was located at Home: [address] [address]. Provider was located at [facility] ([address] [place], [place] [address]): [facility]. [address] [address] [facility], [address] [facility]  Office : [phone]  Fax : [phone]      Subjective: The patient is a 78 y.o. female  who presents for f/u on  chronic med problems  Hypertension--Pt BP been controlled with meds  Prediabetes -pts blood sugar stable on med  Hyperlipidemia--pts on low carb diet and low fat diet -stable on med  Gerd -stable on diet /med  Sarcoidosis/pulmonary nodule-followed by lung specialist-stable-good lung capacity  Pt seeing psychiatrist now depression-stable  Low back pain much better  HX OF DVT/PE--when pt was pregnant  S/p right knee replacement surgery-doing well  +   productive cough,sorethroat,earache,facial pain  and chest/sinus congestion for few days-worse last 2 days--no fever/cp/sob/wheezing/rash/abd symptoms-pt tried some OTC meds without relief.             Patient Active Problem List   Diagnosis Code    Osteoporosis 733.0    Celiac disease K90.0    Hot flashes R23.2    RA (rheumatoid arthritis) (HCC) M06.9    ANTHONY (iron deficiency anemia) D50.9    LEE (obstructive sleep apnea) G47.33    Dysthymic disorder F34.1    Nonallopathic lesion of sacral region M99.9    Lumbago M54.50    Allergic rhinitis J30.9    Osteoarthritis M19.90    Malaise and fatigue R53.81, R53.83 Gastroesophageal reflux disease K21.9    Mixed hyperlipidemia E78.2    Pulmonary sarcoidosis (HCC) D86.0    RLS (restless legs syndrome) G25.81    Other speech disturbances R47.89    History of pulmonary embolism Z86.711    Impaired fasting glucose R73.01    Hypertension I10    IBS (irritable bowel syndrome) K58.9    Tarsal tunnel syndrome G57.50    Pulmonary hypertension, mild (HCC) I27.20    Recurrent major depressive disorder (HCC) F33.9    Generalized anxiety disorder F41.1    Liver cyst K76.89    Obesity, Class I, BMI 30.0-34.9 (see actual BMI) E66.9    Asthma J45.909    Closed fracture of greater tuberosity of right humerus S42.251A    Lesion of liver K76.9    Osteoarthritis of right acromioclavicular joint M19.011    Tear of right rotator cuff M75.101    Primary insomnia F51.01    Osteoporosis M81.0    Environmental allergies Z91.09    Arthritis, hip M16.10    S/P gastric bypass Z98.84    Hyperlipidemia E78.5    Chronic obstructive pulmonary disease (HCC) J44.9    Fibromyalgia M79.7    Medicare annual wellness visit, subsequent Z00.00    History of DVT (deep vein thrombosis) Z86.718    Acute seasonal allergic rhinitis due to pollen J30.1    ACP (advance care planning) Z71.89    BMI 30.0-30.9,adult Z68.30    Dietary counseling Z71.3    Acute right-sided low back pain with sciatica M54.40    History of pulmonary embolus (PE) Z86.711    Fall W19. XXXA    Contusion of left shoulder S40.012A    Cervicalgia M54.2    Radiculopathy affecting upper extremity M54.10    Left shoulder pain M25.512    Left elbow pain M25.522    Sinusitis J32.9    Preoperative clearance Z01.818    Noncompliance with CPAP treatment Z91.14    Arthritis of right knee M17.11    Status post total knee replacement, right Z96.651    Opioid use, unspecified with unspecified opioid-induced disorder F11.99       Past Medical History:   Diagnosis Date    Allergic rhinitis     Anxiety     Arthritis     osteo    Asthma     as a child~no inhalers Celiac disease     denies    COVID-19 vaccine series completed 3/30/21, 4/29/21    Moderna     Depression     DVT (deep vein thrombosis) in pregnancy 2015    not in pregnancy     Dysthymic disorder     Esophageal reflux     daily med for control     Exophthalmos     Fibromyalgia     GERD (gastroesophageal reflux disease)     prontonix daily     Hiatal hernia     Hot flashes     Hypercholesterolemia     Hyperlipidemia     on med for control     Hypertension     no meds     IBS (irritable bowel syndrome)     ANTHONY (iron deficiency anemia)     HX    Impaired fasting glucose     Liver disease     \"liver cyst\"    Lumbago     Malaise and fatigue     Morbid obesity (HCC)     BMI=32.3    Nonallopathic lesion of sacral region     LEE (obstructive sleep apnea)     no c-pap; not since weight loss     Osteoarthritis     Osteoporosis     Osteoporosis     Other speech disturbances     PUD (peptic ulcer disease)     denies    Pulmonary embolism ([address] 2015    took blood thinner for 3 months     Pulmonary hypertension, mild (HCC)     RA (rheumatoid arthritis) (HCC)     RLS (restless legs syndrome)     HX    Sarcoidosis     follow[facility]     Sciatic neuralgia     Tarsal tunnel syndrome     Urinary incontinence     Wears glasses     Wears partial dentures        Past Surgical History:   Procedure Laterality Date    HX BREAST REDUCTION      HX CARPAL TUNNEL RELEASE Right 1997    2 x    HX CHOLECYSTECTOMY      HX GASTRECTOMY  04/17/2018    gastric sleeve     HX HYSTERECTOMY  1978    HX ORTHOPAEDIC      right knee, right toe, right elbow     HX OTHER SURGICAL  05/14/2018    VENA CAVA FILTER REMOVAL     HX OTHER SURGICAL  04/13/2018    VENA CAVA FILTER INSERTION     HX ROTATOR CUFF REPAIR Right 2018    HX TONSILLECTOMY      as a child       Social History     Socioeconomic History    Marital status:      Spouse name: Not on file    Number of children: Not on file    Years of education: Not on file    Highest education level: Not on file   Occupational History    Not on file   Tobacco Use    Smoking status: Never Smoker    Smokeless tobacco: Never Used   Vaping Use    Vaping Use: Never used   Substance and Sexual Activity    Alcohol use: Yes     Alcohol/week: 11.0 standard drinks     Types: 7 Glasses of wine, 4 Cans of beer per week    Drug use: Yes     Types: Marijuana     Comment: cbd gummies    Sexual activity: Not on file   Other Topics Concern    Not on file   Social History Narrative    Not on file     Social Determinants of Health     Financial Resource Strain: Low Risk     Difficulty of Paying Living Expenses: Not hard at all   Food Insecurity: No Food Insecurity    Worried About Running Out of Food in the Last Year: Never true    Ran Out of Food in the Last Year: Never true   Transportation Needs: No Transportation Needs    Lack of Transportation (Medical): No    Lack of Transportation (Non-Medical): No   Physical Activity:     Days of Exercise per Week:     Minutes of Exercise per Session:    Stress:     Feeling of Stress :    Social Connections:     Frequency of Communication with Friends and Family:     Frequency of Social Gatherings with Friends and Family:     Attends Sabianism Services: Active Member of Clubs or Organizations:     Attends Club or Organization Meetings:     Marital Status:    Intimate Partner Violence:     Fear of Current or Ex-Partner:     Emotionally Abused:     Physically Abused:     Sexually Abused: Allergies   Allergen Reactions    Codeine Anaphylaxis, Rash and Nausea and Vomiting     UPDATE: Patient states that codeine only causes rash and her to itch. She says she can take it with benadryl. Current Outpatient Medications   Medication Sig Dispense Refill    Eliquis 2.5 mg tablet TAKE 1 TABLET BY MOUTH EVERY 12 HOURS 70 Tablet 0    alendronate (FOSAMAX) 70 mg tablet Take 1 Tablet by mouth every seven (7) days.  4 Tablet 11    acetaminophen (TYLENOL) 500 mg tablet Take 1,000 mg by mouth every six (6) hours as needed for Pain. ALPRAZolam (XANAX) 0.25 mg tablet Take 1 Tablet by mouth two (2) times daily as needed for Anxiety. Max Daily Amount: 0.5 mg. 60 Tablet 5    zolpidem (Ambien) 10 mg tablet Take 1 Tablet by mouth nightly as needed for Sleep. Max Daily Amount: 10 mg. 30 Tablet 5    venlafaxine-SR (EFFEXOR-XR) 150 mg capsule Take 1 Capsule by mouth daily. 30 Capsule 5    docosahexaenoic acid/epa (FISH OIL PO) Take 1 Tablet by mouth two (2) times a day. atorvastatin (LIPITOR) 80 mg tablet Take 1 Tablet by mouth daily. Indications: excessive fat in the blood 90 Tablet 1    fluticasone propionate (FLONASE) 50 mcg/actuation nasal spray 2 Sprays by Both Nostrils route daily. 1 Bottle 5    pantoprazole (PROTONIX) 40 mg tablet Take 1 Tablet by mouth daily. 90 Tablet 1    loratadine (CLARITIN) 10 mg tablet Take 1 Tab by mouth daily as needed for Allergies. 90 Tab 1    cholecalciferol (VITAMIN D3) 1,000 unit cap Vitamin D3 1,000 unit capsule   Take by oral route. ASCORBATE CALCIUM (VITAMIN C PO) Take 1 Tablet by mouth daily. BIOTIN PO Take 1 Tablet by mouth daily. calcium-cholecalciferol, d3, 600 mg calcium- 200 unit cap Take 2 Tabs by mouth daily. Indications: HYPOCALCEMIA PREVENTION, PREVENTION OF VITAMIN D DEFICIENCY      multivitamin (ONE A DAY) tablet Take 1 Tab by mouth daily. Review of Systems   Constitutional: Negative. HENT: inflammed nasal and throat mucosa. Eyes: Negative. Respiratory: Negative. Cardiovascular: Negative. Gastrointestinal: Negative. Genitourinary: Negative. Musculoskeletal: Positive for back pain, joint pain and myalgias. Skin: Negative. Neurological: Negative. Endo/Heme/Allergies: Negative. Psychiatric/Behavioral: Positive for depression. The patient is nervous/anxious.             Objective:    Visit Vitals  /66 (BP 1 Location: Left upper arm, BP Patient Position: Sitting, BP Cuff Size: Adult)   Pulse 96   Temp 98.1 °F (36.7 °C) (Temporal)   Ht 5' 5\" (1.651 m)   Wt 196 lb (88.9 kg)   SpO2 99%   BMI 32.62 kg/m²       Physical Exam   Constitutional: She is oriented to person, place, and time. She appears well-developed and well-nourished. HENT:   Head: Normocephalic and atraumatic. Right Ear: External ear normal.   Left Ear: External ear normal.   Nose: runny nose/cough  Mouth/Throat: Oropharynx iS INFLAMMED  Eyes: Pupils are equal, round, and reactive to light. Conjunctivae and EOM are normal.   Neck: Normal range of motion. Neck supple. Cardiovascular: Normal rate, regular rhythm, normal heart sounds and intact distal pulses. Pulmonary/Chest: Effort normal and breath sounds normal.   Abdominal: Soft. Bowel sounds are normal.   Musculoskeletal: . Diffuse tenderness in multiple joints-- left lateral neck pain/left elbowshoulder joints/hips/low back    ls spine  diffise tenderness  Right sciatica   Neurological: She is alert and oriented to person, place, and time. She has normal reflexes. Skin: Skin is warm and dry. Psychiatric: She has a normal mood and affect. Her behavior is normal. Judgment and thought content normal.         . RESULTRCNTNC(15W    ASSESSMENT/PLAN:    )1. Medicare annual wellness visit, subsequent  Overview:  Mammogram 10/2018,2019 in [place]-normal as per pt,1/2021  Colonoscopy 11/2017-5yrs f/u  tdap 2015  Hep c neg 2017  prevnar 13 2016  pneumovax 2017  shingrix -received 2 shots in 2020  Hearing normal  Eye exam 2018.2020  DEXA--6/2020  ACP-pt to consider POA      Orders:  -     Comprehensive Metabolic Panel; Future  -     CBC with Auto Differential; Future  -     Lipid Panel; Future  -     TSH; Future  -     T4, Free; Future  2. Sinusitis, unspecified chronicity, unspecified location  Overview:  Steam in halations  Saline nasal cleansing  mucinex as needed  abx  F/u in 2 weeks      Orders:  -     amoxicillin-clavulanate (AUGMENTIN) 875-125 MG per tablet;  Take 1 tablet by mouth 2 times daily for 10 days, Disp-20 tablet, R-0Normal  -     Comprehensive Metabolic Panel; Future  -     CBC with Auto Differential; Future  -     Lipid Panel; Future  -     TSH; Future  -     T4, Free; Future  3. Pharyngitis, unspecified etiology  -     amoxicillin-clavulanate (AUGMENTIN) 875-125 MG per tablet; Take 1 tablet by mouth 2 times daily for 10 days, Disp-20 tablet, R-0Normal  -     Comprehensive Metabolic Panel; Future  -     CBC with Auto Differential; Future  -     Lipid Panel; Future  -     TSH; Future  -     T4, Free; Future  4. Chest congestion  -     amoxicillin-clavulanate (AUGMENTIN) 875-125 MG per tablet; Take 1 tablet by mouth 2 times daily for 10 days, Disp-20 tablet, R-0Normal  -     Comprehensive Metabolic Panel; Future  -     CBC with Auto Differential; Future  -     Lipid Panel; Future  -     TSH; Future  -     T4, Free; Future  5. Screening mammogram for breast cancer  -     SERGEY SCREENING W CAD BILATERAL 2 VW; Future  -     Comprehensive Metabolic Panel; Future  -     CBC with Auto Differential; Future  -     Lipid Panel; Future  -     TSH; Future  -     T4, Free; Future  6. Osteoporosis, unspecified osteoporosis type, unspecified pathological fracture presence  Overview:  Last dexa 2015--osteopenia  ]on fosamax -tolerating well  Recheck in 5/2019-improved osteopenia  10/2021  On fosamax      Orders:  -     Comprehensive Metabolic Panel; Future  -     CBC with Auto Differential; Future  -     Lipid Panel; Future  -     TSH; Future  -     T4, Free; Future  -     DEXA BONE DENSITY AXIAL SKELETON; Future  7. Hyperlipidemia, unspecified hyperlipidemia type  Overview:  Stable on statin  Labs today      Orders:  -     Comprehensive Metabolic Panel; Future  -     CBC with Auto Differential; Future  -     Lipid Panel; Future  -     TSH; Future  -     T4, Free; Future  8.  Chronic obstructive pulmonary disease, unspecified COPD type ([facility] [address]  Overview:   Secondary to second hand tobacco smoke  Asymptomatic  Refuses inhalers      Orders:  -     Comprehensive Metabolic Panel; Future  -     CBC with Auto Differential; Future  -     Lipid Panel; Future  -     TSH; Future  -     T4, Free; Future  9. Obesity, unspecified classification, unspecified obesity type, unspecified whether serious comorbidity present  Overview:  Weight loss encouraged      Orders:  -     Comprehensive Metabolic Panel; Future  -     CBC with Auto Differential; Future  -     Lipid Panel; Future  -     TSH; Future  -     T4, Free; Future  10. Dietary counseling and surveillance  Overview:  Low calorie/fat diet      Orders:  -     Comprehensive Metabolic Panel; Future  -     CBC with Auto Differential; Future  -     Lipid Panel; Future  -     TSH; Future  -     T4, Free; Future  11. Screening for colon cancer  -     Ambulatory referral to Colonoscopy   Orders Placed This Encounter    alendronate (FOSAMAX) 70 mg tablet     Sig: Take 1 Tablet by mouth every seven (7) days.      Dispense:  4 Tablet     Refill:  11     Orders Placed This Encounter   Procedures    SERGEY SCREENING W CAD BILATERAL 2 VW     Standing Status:   Future     Standing Expiration Date:   11/29/2023    DEXA BONE DENSITY AXIAL SKELETON     Standing Status:   Future     Standing Expiration Date:   9/29/2023    Comprehensive Metabolic Panel     Standing Status:   Future     Standing Expiration Date:   9/29/2023    CBC with Auto Differential     Standing Status:   Future     Standing Expiration Date:   9/29/2023    Lipid Panel     Standing Status:   Future     Standing Expiration Date:   9/29/2023     Order Specific Question:   Is Patient Fasting?/# of Hours     Answer:   0    TSH     Standing Status:   Future     Standing Expiration Date:   9/29/2023    T4, Free     Standing Status:   Future     Standing Expiration Date:   9/29/2023    Ambulatory referral to Colonoscopy     Referral Priority:   Routine     Referral Type:   Eval and Treat     Referral Reason: Specialty Services Required     Number of Visits Requested:   1      Orders Placed This Encounter   Medications    amoxicillin-clavulanate (AUGMENTIN) 875-125 MG per tablet     Sig: Take 1 tablet by mouth 2 times daily for 10 days     Dispense:  20 tablet     Refill:  0      Results for orders placed or performed during the hospital encounter of 08/19/21   HEMOGLOBIN   Result Value Ref Range    HGB 11.9 11.7 - 15.4 g/dL   HEMOGLOBIN   Result Value Ref Range    HGB 10.9 (L) 11.7 - 15.4 g/dL   CBC W/O DIFF   Result Value Ref Range    WBC 10.8 4.3 - 11.1 K/uL    RBC 3.81 (L) 4.05 - 5.2 M/uL    HGB 10.5 (L) 11.7 - 15.4 g/dL    HCT 32.1 (L) 35.8 - 46.3 %    MCV 84.3 79.6 - 97.8 FL    MCH 27.6 26.1 - 32.9 PG    MCHC 32.7 31.4 - 35.0 g/dL    RDW 14.7 (H) 11.9 - 14.6 %    PLATELET 404 012 - 837 K/uL    MPV 10.5 9.4 - 12.3 FL    ABSOLUTE NRBC 0.00 0.0 - 0.2 K/uL     We discussed the expected course, resolution and complications of the diagnosis(es) in detail. Medication risks, benefits, costs, interactions, and alternatives were discussed as indicated. I advised her to contact the office if her condition worsens, changes or fails to improve as anticipated. She expressed understanding with the diagnosis(es) and plan. Follow-up and Dispositions    Return in about 3 months or as needed         Jaky Saeed MD

## 2022-10-04 DIAGNOSIS — J32.9 SINUSITIS, UNSPECIFIED CHRONICITY, UNSPECIFIED LOCATION: ICD-10-CM

## 2022-10-04 DIAGNOSIS — E66.9 OBESITY, UNSPECIFIED CLASSIFICATION, UNSPECIFIED OBESITY TYPE, UNSPECIFIED WHETHER SERIOUS COMORBIDITY PRESENT: ICD-10-CM

## 2022-10-04 DIAGNOSIS — Z00.00 MEDICARE ANNUAL WELLNESS VISIT, SUBSEQUENT: ICD-10-CM

## 2022-10-04 DIAGNOSIS — Z12.31 SCREENING MAMMOGRAM FOR BREAST CANCER: ICD-10-CM

## 2022-10-04 DIAGNOSIS — M81.0 OSTEOPOROSIS, UNSPECIFIED OSTEOPOROSIS TYPE, UNSPECIFIED PATHOLOGICAL FRACTURE PRESENCE: ICD-10-CM

## 2022-10-04 DIAGNOSIS — Z71.3 DIETARY COUNSELING AND SURVEILLANCE: ICD-10-CM

## 2022-10-04 DIAGNOSIS — E78.5 HYPERLIPIDEMIA, UNSPECIFIED HYPERLIPIDEMIA TYPE: ICD-10-CM

## 2022-10-04 DIAGNOSIS — J44.9 CHRONIC OBSTRUCTIVE PULMONARY DISEASE, UNSPECIFIED COPD TYPE (HCC): ICD-10-CM

## 2022-10-04 DIAGNOSIS — J02.9 PHARYNGITIS, UNSPECIFIED ETIOLOGY: ICD-10-CM

## 2022-10-04 DIAGNOSIS — R09.89 CHEST CONGESTION: ICD-10-CM

## 2022-10-04 LAB
ALBUMIN SERPL-MCNC: 4 G/DL (ref 3.2–4.6)
ALBUMIN/GLOB SERPL: 1.4 {RATIO} (ref 1.2–3.5)
ALP SERPL-CCNC: 86 U/L (ref 50–136)
ALT SERPL-CCNC: 29 U/L (ref 12–65)
ANION GAP SERPL CALC-SCNC: 5 MMOL/L (ref 4–13)
AST SERPL-CCNC: 26 U/L (ref 15–37)
BASOPHILS # BLD: 0.1 K/UL (ref 0–0.2)
BASOPHILS NFR BLD: 1 % (ref 0–2)
BILIRUB SERPL-MCNC: 1 MG/DL (ref 0.2–1.1)
BUN SERPL-MCNC: 16 MG/DL (ref 8–23)
CALCIUM SERPL-MCNC: 9.7 MG/DL (ref 8.3–10.4)
CHLORIDE SERPL-SCNC: 106 MMOL/L (ref 101–110)
CHOLEST SERPL-MCNC: 115 MG/DL
CO2 SERPL-SCNC: 28 MMOL/L (ref 21–32)
CREAT SERPL-MCNC: 0.9 MG/DL (ref 0.6–1)
DIFFERENTIAL METHOD BLD: ABNORMAL
EOSINOPHIL # BLD: 0.1 K/UL (ref 0–0.8)
EOSINOPHIL NFR BLD: 1 % (ref 0.5–7.8)
ERYTHROCYTE [DISTWIDTH] IN BLOOD BY AUTOMATED COUNT: 13.6 % (ref 11.9–14.6)
GLOBULIN SER CALC-MCNC: 2.9 G/DL (ref 2.3–3.5)
GLUCOSE SERPL-MCNC: 104 MG/DL (ref 65–100)
HCT VFR BLD AUTO: 43.2 % (ref 35.8–46.3)
HDLC SERPL-MCNC: 51 MG/DL (ref 40–60)
HDLC SERPL: 2.3 {RATIO}
HGB BLD-MCNC: 13.3 G/DL (ref 11.7–15.4)
IMM GRANULOCYTES # BLD AUTO: 0.1 K/UL (ref 0–0.5)
IMM GRANULOCYTES NFR BLD AUTO: 1 % (ref 0–5)
LDLC SERPL CALC-MCNC: 42.4 MG/DL
LYMPHOCYTES # BLD: 2.5 K/UL (ref 0.5–4.6)
LYMPHOCYTES NFR BLD: 28 % (ref 13–44)
MCH RBC QN AUTO: 26.8 PG (ref 26.1–32.9)
MCHC RBC AUTO-ENTMCNC: 30.8 G/DL (ref 31.4–35)
MCV RBC AUTO: 87.1 FL (ref 79.6–97.8)
MONOCYTES # BLD: 0.5 K/UL (ref 0.1–1.3)
MONOCYTES NFR BLD: 6 % (ref 4–12)
NEUTS SEG # BLD: 5.8 K/UL (ref 1.7–8.2)
NEUTS SEG NFR BLD: 64 % (ref 43–78)
NRBC # BLD: 0 K/UL (ref 0–0.2)
PLATELET # BLD AUTO: 296 K/UL (ref 150–450)
PMV BLD AUTO: 9.9 FL (ref 9.4–12.3)
POTASSIUM SERPL-SCNC: 3.9 MMOL/L (ref 3.5–5.1)
PROT SERPL-MCNC: 6.9 G/DL (ref 6.3–8.2)
RBC # BLD AUTO: 4.96 M/UL (ref 4.05–5.2)
SODIUM SERPL-SCNC: 139 MMOL/L (ref 136–145)
T4 FREE SERPL-MCNC: 1 NG/DL (ref 0.78–1.46)
TRIGL SERPL-MCNC: 108 MG/DL (ref 35–150)
TSH, 3RD GENERATION: 0.59 UIU/ML (ref 0.36–3.74)
VLDLC SERPL CALC-MCNC: 21.6 MG/DL (ref 6–23)
WBC # BLD AUTO: 9.1 K/UL (ref 4.3–11.1)

## 2022-10-10 ENCOUNTER — HOSPITAL ENCOUNTER (EMERGENCY)
Dept: GENERAL RADIOLOGY | Age: 67
Discharge: HOME | End: 2022-10-13
Payer: MEDICARE

## 2022-10-10 ENCOUNTER — HOSPITAL ENCOUNTER (EMERGENCY)
Age: 67
Discharge: HOME | End: 2022-10-10
Attending: EMERGENCY MEDICINE | Admitting: STUDENT IN AN ORGANIZED HEALTH CARE EDUCATION/TRAINING PROGRAM
Payer: MEDICARE

## 2022-10-10 ENCOUNTER — HOSPITAL ENCOUNTER (EMERGENCY)
Dept: CT IMAGING | Age: 67
Discharge: HOME | End: 2022-10-13
Payer: MEDICARE

## 2022-10-10 VITALS
WEIGHT: 185 LBS | DIASTOLIC BLOOD PRESSURE: 64 MMHG | HEART RATE: 78 BPM | RESPIRATION RATE: 18 BRPM | HEIGHT: 65 IN | BODY MASS INDEX: 30.82 KG/M2 | SYSTOLIC BLOOD PRESSURE: 133 MMHG | OXYGEN SATURATION: 96 % | TEMPERATURE: 98.3 F

## 2022-10-10 DIAGNOSIS — J06.9 ACUTE UPPER RESPIRATORY INFECTION: ICD-10-CM

## 2022-10-10 DIAGNOSIS — R07.9 CHEST PAIN, UNSPECIFIED TYPE: Primary | ICD-10-CM

## 2022-10-10 LAB
ALBUMIN SERPL-MCNC: 4 G/DL (ref 3.2–4.6)
ALBUMIN/GLOB SERPL: 1.1 {RATIO} (ref 1.2–3.5)
ALP SERPL-CCNC: 101 U/L (ref 50–136)
ALT SERPL-CCNC: 28 U/L (ref 12–65)
ANION GAP SERPL CALC-SCNC: 4 MMOL/L (ref 4–13)
AST SERPL-CCNC: 23 U/L (ref 15–37)
BILIRUB SERPL-MCNC: 1.2 MG/DL (ref 0.2–1.1)
BUN SERPL-MCNC: 16 MG/DL (ref 8–23)
CALCIUM SERPL-MCNC: 9.2 MG/DL (ref 8.3–10.4)
CHLORIDE SERPL-SCNC: 106 MMOL/L (ref 101–110)
CO2 SERPL-SCNC: 29 MMOL/L (ref 21–32)
CREAT SERPL-MCNC: 0.85 MG/DL (ref 0.6–1)
EKG ATRIAL RATE: 86 BPM
EKG DIAGNOSIS: NORMAL
EKG P AXIS: 75 DEGREES
EKG P-R INTERVAL: 132 MS
EKG Q-T INTERVAL: 348 MS
EKG QRS DURATION: 76 MS
EKG QTC CALCULATION (BAZETT): 416 MS
EKG R AXIS: 61 DEGREES
EKG T AXIS: 51 DEGREES
EKG VENTRICULAR RATE: 86 BPM
ERYTHROCYTE [DISTWIDTH] IN BLOOD BY AUTOMATED COUNT: 13.4 % (ref 11.9–14.6)
GLOBULIN SER CALC-MCNC: 3.7 G/DL (ref 2.3–3.5)
GLUCOSE SERPL-MCNC: 100 MG/DL (ref 65–100)
HCT VFR BLD AUTO: 43.1 % (ref 35.8–46.3)
HGB BLD-MCNC: 13.7 G/DL (ref 11.7–15.4)
MCH RBC QN AUTO: 27 PG (ref 26.1–32.9)
MCHC RBC AUTO-ENTMCNC: 31.8 G/DL (ref 31.4–35)
MCV RBC AUTO: 84.8 FL (ref 79.6–97.8)
NRBC # BLD: 0 K/UL (ref 0–0.2)
PLATELET # BLD AUTO: 290 K/UL (ref 150–450)
PMV BLD AUTO: 9.5 FL (ref 9.4–12.3)
POTASSIUM SERPL-SCNC: 3.8 MMOL/L (ref 3.5–5.1)
PROT SERPL-MCNC: 7.7 G/DL (ref 6.3–8.2)
RBC # BLD AUTO: 5.08 M/UL (ref 4.05–5.2)
SODIUM SERPL-SCNC: 139 MMOL/L (ref 136–145)
TROPONIN I SERPL HS-MCNC: 3.4 PG/ML (ref 0–14)
WBC # BLD AUTO: 9.5 K/UL (ref 4.3–11.1)

## 2022-10-10 PROCEDURE — 93005 ELECTROCARDIOGRAM TRACING: CPT | Performed by: EMERGENCY MEDICINE

## 2022-10-10 PROCEDURE — 71046 X-RAY EXAM CHEST 2 VIEWS: CPT

## 2022-10-10 PROCEDURE — 80053 COMPREHEN METABOLIC PANEL: CPT

## 2022-10-10 PROCEDURE — 84484 ASSAY OF TROPONIN QUANT: CPT

## 2022-10-10 PROCEDURE — 99285 EMERGENCY DEPT VISIT HI MDM: CPT | Performed by: EMERGENCY MEDICINE

## 2022-10-10 PROCEDURE — 6360000004 HC RX CONTRAST MEDICATION: Performed by: EMERGENCY MEDICINE

## 2022-10-10 PROCEDURE — 85027 COMPLETE CBC AUTOMATED: CPT

## 2022-10-10 PROCEDURE — 2580000003 HC RX 258: Performed by: EMERGENCY MEDICINE

## 2022-10-10 PROCEDURE — 71260 CT THORAX DX C+: CPT | Performed by: NURSE PRACTITIONER

## 2022-10-10 RX ORDER — 0.9 % SODIUM CHLORIDE 0.9 %
100 INTRAVENOUS SOLUTION INTRAVENOUS
Status: COMPLETED | OUTPATIENT
Start: 2022-10-10 | End: 2022-10-10

## 2022-10-10 RX ORDER — SODIUM CHLORIDE 0.9 % (FLUSH) 0.9 %
10 SYRINGE (ML) INJECTION
Status: DISCONTINUED | OUTPATIENT
Start: 2022-10-10 | End: 2022-10-14 | Stop reason: HOSPADM

## 2022-10-10 RX ORDER — DOXYCYCLINE HYCLATE 100 MG
100 TABLET ORAL 2 TIMES DAILY
Qty: 14 TABLET | Refills: 0 | Status: SHIPPED | OUTPATIENT
Start: 2022-10-10 | End: 2022-10-17

## 2022-10-10 RX ADMIN — SODIUM CHLORIDE 100 ML: 9 INJECTION, SOLUTION INTRAVENOUS at 14:46

## 2022-10-10 RX ADMIN — IOPAMIDOL 100 ML: 755 INJECTION, SOLUTION INTRAVENOUS at 14:45

## 2022-10-10 ASSESSMENT — PAIN DESCRIPTION - LOCATION: LOCATION: CHEST

## 2022-10-10 ASSESSMENT — PAIN - FUNCTIONAL ASSESSMENT: PAIN_FUNCTIONAL_ASSESSMENT: 0-10

## 2022-10-10 ASSESSMENT — PAIN DESCRIPTION - DESCRIPTORS: DESCRIPTORS: ACHING

## 2022-10-10 ASSESSMENT — PAIN SCALES - GENERAL: PAINLEVEL_OUTOF10: 5

## 2022-10-10 NOTE — ED PROVIDER NOTES
[employer] Emergency Department Provider Note                   PCP:                Aide Chan MD               Age: 75 y.o. Sex: female       ICD-10-CM    1. Chest pain, unspecified type  R07.9       2. Acute upper respiratory infection  J06.9           DISPOSITION      DC    [facility] as charted. Pt neck is supple, no meningeal signs. Lungs are clear to auscultation, no diminished sounds. + Chest wall tenderness, no guarding, no crepitus, no bruising or paradoxical movement. abdomen is soft and not tender. No lower extremity swelling and no pitting edema, skin normal color temperature, intact DP PT pulses. Shortness of breath or HARTMAN, no increased respiratory effort with conversation. Not hypoxic or tachypneic. Not tachycardic. Low clinical suspicion ACS, pneumothorax, dissection, sepsis, other acute emergent process. Consideration given to pulmonary embolism, pneumonia. Reports recently completed Augmentin without improvement. No hemoptysis. History of PE  Chest x-ray with no acute process, EKG reassuring as is troponin and labs. CTA reviewed with no PE. No new or worse symptoms in the ED. Discussed ED attending. Feel stable for discharge home. Will prescribe doxycycline for persistent respiratory infection, perhaps  pneumonia. Strict return to ED for care instruction provided. Advised to follow-up with PCP in 1-2 days. Return to ED immediately for any new, worsening, concerning symptoms. Patient is very well-hydrated appearing, no distress, pleasant and conversational.  Nontoxic-appearing, tolerating oral intake. Patient is hemodynamically stable and in no acute distress. Patient is not ill-appearing. Discussed patient with attending who reviewed the patient's results and collaborated on care planning. All findings and plans were discussed with the patient. Patient verbalizes desire to be discharged home at this time. All questions answered.  Discussed with the patient that an unremarkable evaluation in the ED does not preclude the development or presence of a serious or life threatening condition. Patient was instructed to return immediately for any worsening or change in current symptoms, or if symptoms do not continue to improve. I instructed them to follow up with their primary care provider, own specialist, or medical provider that I am recommending for him within the next 2-3 days     the patient acknowledged understanding plan of care and affirmed approval. Patient is discharged home, with no further complaint. Orders Placed This Encounter   Procedures    XR CHEST (2 VW)    CBC    Comprehensive Metabolic Panel    Troponin    Cardiac Monitor    Pulse Oximetry    EKG 12 Lead    Saline lock IV        Jake Cortez is a 75 y.o. female who presents to the Emergency Department with chief complaint of    Chief Complaint   Patient presents with    Chest Pain      HPI  5year-old female presents to the ED with complaints of chest pain and cough. States that pain has been constant x3 weeks and made worse with cough, movement, lifting, deep inspiration. Reports similar pain in the past which she related to a pulmonary embolism. This was in 2015. She is no longer anticoagulated. She reports occasional shortness of breath, but none at present. Denies exertional, opponents, hemoptysis. Endorses green-colored expectorate. States that her PCP treated her with a 1 week course of Augmentin which she completed yesterday without improvement.  -Syncope or near syncope, no lower extremity swelling. Denies fever/chills, change in appetite, weight loss, decreased oral intake, blurred vision, headaches, neck pain/stiffness. Alert & oriented x 3, afebrile, hemodynamically stable, non-toxic appearing, appears in no distress. Medical/surgical/social history reviewed with the patient. Review of Systems  Constitutional: Negative for fever.  Negative for appetite change, chills, diaphoresis and unexpected weight change. HENT: As in HPI     Eyes: Negative. Respiratory: As in HPI   Cardiovascular: Negative. GI/: As in HPI      Musculoskeletal: As in HPI   Skin: Negative. Allergic/Immunologic: Negative. Neurological: Negative.       Past Medical History:   Diagnosis Date    Allergic rhinitis     Anxiety     Arthritis     osteo    Asthma     as a child    Celiac disease     denies    COVID-19 vaccine series completed 3/30/21, 4/29/21    Moderna     Depression     DVT (deep vein thrombosis) in pregnancy 2015    not in pregnancy     Dysthymic disorder     Esophageal reflux     daily med for control     Exophthalmos     Fibromyalgia     GERD (gastroesophageal reflux disease)     prontonix daily     Hiatal hernia     Hot flashes     Hypercholesterolemia     Hyperlipidemia     on med for control     Hypertension     no meds     IBS (irritable bowel syndrome)     ANTHONY (iron deficiency anemia)     HX    Impaired fasting glucose     Liver disease     \"liver cyst\"    Lumbago     Malaise and fatigue     Morbid obesity (HCC)     BMI=32.3    Nonallopathic lesion of sacral region     LEE (obstructive sleep apnea)     no c-pap; not since weight loss     Osteoarthritis     Osteoporosis     Osteoporosis     Other speech disturbances     PUD (peptic ulcer disease)     denies    Pulmonary embolism ([facility] [address] 2015    took blood thinner for 3 months     Pulmonary hypertension, mild (HCC)     RA (rheumatoid arthritis) (HCC)     RLS (restless legs syndrome)     HX    Sarcoidosis     followed by [facility]     Sciatic neuralgia     Tarsal tunnel syndrome     Urinary incontinence     Wears glasses     Wears partial dentures         Past Surgical History:   Procedure Laterality Date    BREAST REDUCTION SURGERY      CARPAL TUNNEL RELEASE Right 1997    2 x    CHOLECYSTECTOMY      GASTRECTOMY  04/17/2018    gastric sleeve     HYSTERECTOMY (CERVIX STATUS UNKNOWN)  1978    IR IVC FILTER PLACEMENT W IMAGING  4/13/2018    IR IVC FILTER PLACEMENT W IMAGING 4/13/2018 [facility] RADIOLOGY SPECIALS    IR IVC FILTER RETRIEVAL  5/14/2018    IR IVC FILTER RETRIEVAL 5/14/2018 [facility] RADIOLOGY SPECIALS    ORTHOPEDIC SURGERY      right knee, right toe, right elbow     OTHER SURGICAL HISTORY  04/13/2018    VENA CAVA FILTER INSERTION     OTHER SURGICAL HISTORY  05/14/2018    VENA CAVA FILTER REMOVAL     ROTATOR CUFF REPAIR Right 2018    TONSILLECTOMY      as a child    TOTAL KNEE ARTHROPLASTY Right 08/19/2021        Family History   Problem Relation Age of Onset    Other Other         fam hx of liver    Cancer Sister         pancreatic    Cancer Other         lung     Colon Cancer Other     Cancer Father         colon ca    Diabetes Father     Pneumonia Mother     Breast Cancer Neg Hx         Social History     Socioeconomic History    Marital status:      Spouse name: None    Number of children: None    Years of education: None    Highest education level: None   Tobacco Use    Smoking status: Never    Smokeless tobacco: Never   Vaping Use    Vaping Use: Never used   Substance and Sexual Activity    Alcohol use: Yes     Alcohol/week: 11.0 standard drinks    Drug use: Not Currently     Types: Marijuana [place])         Codeine     Previous Medications    ACETAMINOPHEN (TYLENOL) 500 MG TABLET    Take 500 mg by mouth every 6 hours as needed for Pain    ALENDRONATE (FOSAMAX) 70 MG TABLET    Take 70 mg by mouth every 7 days    ALPRAZOLAM (XANAX) 0.25 MG TABLET    Take 1 tablet by mouth 2 times daily as needed for Sleep for up to 90 days. DEXTROMETHORPHAN-GUAIFENESIN (MUCINEX DM PO)    Take by mouth as needed    OMEPRAZOLE (PRILOSEC) 40 MG DELAYED RELEASE CAPSULE    Take 40 mg by mouth daily    SIMVASTATIN (ZOCOR) 40 MG TABLET    Zocor 40 mg tablet   Take 1 tablet every day by oral route.     VENLAFAXINE (EFFEXOR XR) 150 MG EXTENDED RELEASE CAPSULE    Take 1 capsule by mouth daily    VITAMIN D 25 MCG (1000 UT) CAPS    Take 1 capsule by mouth daily    ZOLPIDEM (AMBIEN) 10 MG TABLET    Take 1 tablet by mouth nightly as needed for Sleep for up to 90 days. Vitals signs and nursing note reviewed. Patient Vitals for the past 4 hrs:   Temp Pulse Resp BP SpO2   10/10/22 1145 98.3 °F (36.8 °C) 89 16 121/68 100 %          Physical Exam   Constitutional: Oriented to person, place, and time. Appears well-developed and well-nourished. No distress. HENT:    Head: Normocephalic and atraumatic. Right Ear: External ear normal.    Left Ear: External ear normal.    Nose: Nose normal.    Mouth/Throat: Mouth normal. Uvula midline, no erythema/exudates/edema. No drooling, no voice change. No pain with ROM of neck. Eyes: Conjunctivae are normal.   Neck: Supple. No tracheal deviation. Not tender, not rigid, no menningeal signs. Cardiovascular: Normal rate, intact distal pulses. Pulmonary/Chest: No respiratory distress. Normoactive bowel sounds. +. Chest wall tenderness. Abdominal: Soft. Nontender. Normoactive sounds. Musculoskeletal: No obvious deformity, erythema, edema. Neurological: Alert and oriented to person, place, and time. Skin:  No abrasion, no lesion, no petechiae and no rash noted. Not diaphoretic. No cyanosis, erythema, or pallor. Psychiatric: Normal mood and affect. Behavior is normal.    Nursing note and vitals reviewed. Procedures      Labs Reviewed   COMPREHENSIVE METABOLIC PANEL - Abnormal; Notable for the following components:       Result Value    Total Bilirubin 1.2 (*)     Globulin 3.7 (*)     Albumin/Globulin Ratio 1.1 (*)     All other components within normal limits   CBC   TROPONIN   TROPONIN        XR CHEST (2 VW)   Final Result      Stable exam without acute cardiopulmonary abnormality.                           ED Course as of 10/10/22 1654   Mon Oct 10, 2022   1144 ECG interpretation for ECG dated 10 October 2022 at 11:42 AM: ECG reveals a normal sinus rhythm rate 86 bpm, normal OH and QTc intervals and normal axis. Normal ECG. Andrés Alcaraz MD   [BB]      ED Course User Index  [BB] Andrés Alcaraz MD        Voice dictation software was used during the making of this note. This software is not perfect and grammatical and other typographical errors may be present. This note has not been completely proofread for errors.          Juany Becerra, APRN - CNP  10/10/22 [id number]

## 2022-10-10 NOTE — ED NOTES
I have reviewed discharge instructions with the patient. The patient verbalized understanding. Patient left ED via Discharge Method: ambulatory to Home with ( self). Opportunity for questions and clarification provided. Patient given 1 scripts. No esign         To continue your aftercare when you leave the hospital, you may receive an automated call from our care team to check in on how you are doing. This is a free service and part of our promise to provide the best care and service to meet your aftercare needs.  If you have questions, or wish to unsubscribe from this service please call [phone]. Thank you for Choosing our [facility] Emergency Department.       Anna Mon RN  10/10/22 0096

## 2022-10-10 NOTE — ED TRIAGE NOTES
Pt with sternal chest pain for 2 weeks. Pt has had an URI for 2 weeks as well. Pt denies cardiac history.

## 2022-10-11 ENCOUNTER — CARE COORDINATION (OUTPATIENT)
Dept: CARE COORDINATION | Facility: CLINIC | Age: 67
End: 2022-10-11

## 2022-10-11 NOTE — CARE COORDINATION
Ambulatory Care Coordination Note  10/11/2022    ACC: Suma Osei RN    Ccm outreached to patient. Patient agreeable. Reviewed with patient discharge summary. Patient verbalized understanding. Patient states she is taking abt as directed. Discussed with patient importance of eating and drinking well. Patient verbalized understanding. Patient states no chest pain at this time and no SOB. Ccm notified pcp of ER admission. Patient has follow up on Thursday. Patient states no questions or concerns. Ccm discussed that I would outreach next week. Patient agreeable. Offered patient enrollment in the Remote Patient Monitoring (RPM) program for in-home monitoring: NA. Ambulatory Care Coordination Assessment    Care Coordination Protocol  Referral from Primary Care Provider: No  Week 1 - Initial Assessment     Do you have all of your prescriptions and are they filled?: Yes  Barriers to medication adherence: None  Are you able to afford your medications?: Yes  How often do you have trouble taking your medications the way you have been told to take them?: I always take them as prescribed. Do you have Home O2 Therapy?: No      Ability to seek help/take action for Emergent Urgent situations i.e. fire, crime, inclement weather or health crisis. : Independent  Ability to ambulate to restroom: Independent  Ability handle personal hygeine needs (bathing/dressing/grooming): Independent  Ability to manage Medications: Independent  Ability to prepare Food Preparation: Independent  Ability to maintain home (clean home, laundry): Independent  Ability to drive and/or has transportation: Independent  Ability to do shopping: Independent  Ability to manage finances:  Independent  Is patient able to live independently?: Yes     Current Housing: Private Residence        Per the [address], did the patient have 2 or more falls or 1 fall with injury in the past year?: Yes  How often do you think you are about to fall and you do NOT fall? For example, you grab something to stabilize yourself or hold onto a wall/furniture?: Never  Use of a Mobility Aid: No  Difficulty walking/impaired gait: No  Issues with feet or shoes like numbness, edema, shoes not fitting: No  Changes in vision, poor vision or poor lighting in environment: No  Dizziness: No  Other Fall Risk: No     Frequent urination at night?: No  Do you use rails/bars?: No  Do you have a non-slip tub mat?: Yes     Are you experiencing loss of meaning?: No  Are you experiencing loss of hope and peace?: No     Thinking about your patient's physical health needs, are there any symptoms or problems (risk indicators) you are unsure about that require further investigation?: No identified areas of uncertainly or problems already being investigated   Are the patients physical health problems impacting on their mental well-being?: No identified areas of concern   Are there any problems with your patients lifestyle behaviors (alcohol, drugs, diet, exercise) that are impacting on physical or mental well-being?: No identified areas of concern   Do you have any other concerns about your patients mental well-being?  How would you rate their severity and impact on the patient?: No identified areas of concern   How would you rate their home environment in terms of safety and stability (including domestic violence, insecure housing, neighbor harassment)?: Consistently safe, supportive, stable, no identified problems   How do daily activities impact on the patient's well-being? (include current or anticipated unemployment, work, caregiving, access to transportation or other): No identified problems or perceived positive benefits   How would you rate their social network (family, work, friends)?: Good participation with social networks   How would you rate their financial resources (including ability to afford all required medical care)?: Financially secure, resources adequate, no identified problems   How wells does the patient now understand their health and well-being (symptoms, signs or risk factors) and what they need to do to manage their health?: Reasonable to good understanding and already engages in managing health or is willing to undertake better management   How well do you think your patient can engage in healthcare discussions? (Barriers include language, deafness, aphasia, alcohol or drug problems, learning difficulties, concentration): Clear and open communication, no identified barriers   Do other services need to be involved to help this patient?: Other care/services not required at this time   Are current services involved with this patient well-coordinated? (Include coordination with other services you are now recommendation): All required care/services in place and well-coordinated   Suggested Interventions and Community Resources         Schedule an appointment with the patient's PCP, Set up/Review Goals, Set up/Review an Education Plan              Prior to Admission medications    Medication Sig Start Date End Date Taking? Authorizing Provider   doxycycline hyclate (VIBRA-TABS) 100 MG tablet Take 1 tablet by mouth 2 times daily for 7 days 10/10/22 10/17/22  KARLENE Casey - CNP   Dextromethorphan-guaiFENesin ([address] DM PO) Take by mouth as needed    Historical Provider, MD   acetaminophen (TYLENOL) 500 MG tablet Take 500 mg by mouth every 6 hours as needed for Pain    Historical Provider, MD   vitamin D 25 MCG (1000 UT) CAPS Take 1 capsule by mouth daily    Historical Provider, MD   omeprazole (PRILOSEC) 40 MG delayed release capsule Take 40 mg by mouth daily 7/19/22   Historical Provider, MD   simvastatin (ZOCOR) 40 MG tablet Zocor 40 mg tablet   Take 1 tablet every day by oral route.     Historical Provider, MD   venlafaxine (EFFEXOR XR) 150 MG extended release capsule Take 1 capsule by mouth daily 9/21/22   Susie Hall MD   zolpidem (AMBIEN) 10 MG tablet Take 1 tablet by mouth nightly as needed for Sleep for up to 90 days. 9/21/22 12/20/22  Unknown MD Madalyn   ALPRAZolam [place]) 0.25 MG tablet Take 1 tablet by mouth 2 times daily as needed for Sleep for up to 90 days.  9/21/22 12/20/22  Unknown MD Madalyn   alendronate (FOSAMAX) 70 MG tablet Take 70 mg by mouth every 7 days 6/7/22   Historical Provider, MD       Future Appointments   Date Time Provider Frannie Montiel   11/29/2022  1:20 PM Jorge Godfrey MD [facility]   12/15/2022 10:00 AM Unknown MD Madalyn [id number]

## 2022-10-13 PROBLEM — J40 BRONCHITIS: Status: ACTIVE | Noted: 2022-10-13

## 2022-10-18 ENCOUNTER — TELEPHONE (OUTPATIENT)
Dept: PRIMARY CARE CLINIC | Facility: CLINIC | Age: 67
End: 2022-10-18

## 2022-10-18 ENCOUNTER — CARE COORDINATION (OUTPATIENT)
Dept: CARE COORDINATION | Facility: CLINIC | Age: 67
End: 2022-10-18

## 2022-10-18 NOTE — CARE COORDINATION
Ambulatory Care Coordination Note  10/18/2022    ACC: Emelia Arellano, JR    Ccm outreached to patient. Patient states she is feeling much better. Patient is taking medications as directed. Patient is eating and drinking well. Patent states no questions or concerns. Ccm discussed that I would outreach next week. Patient agreeable. Offered patient enrollment in the Remote Patient Monitoring (RPM) program for in-home monitoring: NA. Lab Results       None            Care Coordination Interventions    Referral from Primary Care Provider: No  Suggested Interventions and Community Resources          Goals Addressed                   This Visit's Progress     Conditions and Symptoms   On track     I will schedule office visits, as directed by my provider. I will keep my appointment or reschedule if I have to cancel. I will notify my provider of any barriers to my plan of care. I will notify my provider of any symptoms that indicate a worsening of my condition. Barriers: none  Plan for overcoming my barriers: N/A  Confidence: 9/10  Anticipated Goal Completion Date: 12/11/22                Prior to Admission medications    Medication Sig Start Date End Date Taking? Authorizing Provider   Dextromethorphan-guaiFENesin [facility] DM PO) Take by mouth as needed    Historical Provider, MD   acetaminophen (TYLENOL) 500 MG tablet Take 500 mg by mouth every 6 hours as needed for Pain    Historical Provider, MD   vitamin D 25 MCG (1000 UT) CAPS Take 1 capsule by mouth daily    Historical Provider, MD   omeprazole (PRILOSEC) 40 MG delayed release capsule Take 40 mg by mouth daily 7/19/22   Historical Provider, MD   simvastatin (ZOCOR) 40 MG tablet Zocor 40 mg tablet   Take 1 tablet every day by oral route.     Historical Provider, MD   venlafaxine (EFFEXOR XR) 150 MG extended release capsule Take 1 capsule by mouth daily 9/21/22   Иван Flores MD   zolpidem (AMBIEN) 10 MG tablet Take 1 tablet by mouth nightly as needed for Sleep for up to 90 days. 9/21/22 12/20/22  Sheron Temple MD   ALPRAZolam [place]) 0.25 MG tablet Take 1 tablet by mouth 2 times daily as needed for Sleep for up to 90 days.  9/21/22 12/20/22  Sheron Temple MD   alendronate (FOSAMAX) 70 MG tablet Take 70 mg by mouth every 7 days 6/7/22   Historical Provider, MD       Future Appointments   Date Time Provider Frannie Edmondsi   11/29/2022  1:20 PM Hank Gibbs MD [facility]   12/15/2022 10:00 AM Sheron Temple MD [id number]

## 2022-10-20 NOTE — TELEPHONE ENCOUNTER
Spoke with patient,   Pt still having nasal discharge and chest congestion. Patient wants to know if you can give her a new antibiotic. Last appointment was on 9/29/22.

## 2022-10-21 PROBLEM — Z00.00 MEDICARE ANNUAL WELLNESS VISIT, SUBSEQUENT: Status: RESOLVED | Noted: 2022-07-18 | Resolved: 2022-10-21

## 2022-10-24 ENCOUNTER — CARE COORDINATION (OUTPATIENT)
Dept: CARE COORDINATION | Facility: CLINIC | Age: 67
End: 2022-10-24

## 2022-10-24 NOTE — CARE COORDINATION
Ambulatory Care Coordination Note  10/24/2022    ACC: Nazanin Muhammad RN    Ccm outreached to patient. Patient states she is doing well and feeling much better. Patient states no questions or concerns. Ccm discussed that I would outreach next week. Patient agreeable. Offered patient enrollment in the Remote Patient Monitoring (RPM) program for in-home monitoring: NA. Lab Results       None            Care Coordination Interventions    Referral from Primary Care Provider: No  Suggested Interventions and Community Resources          Goals Addressed                   This Visit's Progress     Conditions and Symptoms   On track     I will schedule office visits, as directed by my provider. I will keep my appointment or reschedule if I have to cancel. I will notify my provider of any barriers to my plan of care. I will notify my provider of any symptoms that indicate a worsening of my condition. Barriers: none  Plan for overcoming my barriers: N/A  Confidence: 9/10  Anticipated Goal Completion Date: 12/11/22                Prior to Admission medications    Medication Sig Start Date End Date Taking? Authorizing Provider   Dextromethorphan-guaiFENesin [facility] DM PO) Take by mouth as needed    Historical Provider, MD   acetaminophen (TYLENOL) 500 MG tablet Take 500 mg by mouth every 6 hours as needed for Pain    Historical Provider, MD   vitamin D 25 MCG (1000 UT) CAPS Take 1 capsule by mouth daily    Historical Provider, MD   omeprazole (PRILOSEC) 40 MG delayed release capsule Take 40 mg by mouth daily 7/19/22   Historical Provider, MD   simvastatin (ZOCOR) 40 MG tablet Zocor 40 mg tablet   Take 1 tablet every day by oral route. Historical Provider, MD   venlafaxine (EFFEXOR XR) 150 MG extended release capsule Take 1 capsule by mouth daily 9/21/22   Tracey Loredo MD   zolpidem (AMBIEN) 10 MG tablet Take 1 tablet by mouth nightly as needed for Sleep for up to 90 days.  9/21/22 12/20/22  Tracey Loredo MD ALPRAZolam (XANAX) 0.25 MG tablet Take 1 tablet by mouth 2 times daily as needed for Sleep for up to 90 days.  9/21/22 12/20/22  Unknown MD Madalyn   alendronate (FOSAMAX) 70 MG tablet Take 70 mg by mouth every 7 days 6/7/22   Historical Provider, MD       Future Appointments   Date Time Provider Frannie Tiana   11/29/2022  1:20 PM Jorge Godfrey MD [facility]   12/15/2022 10:00 AM Unknown MD Madalyn [id number]

## 2022-10-29 PROBLEM — Z12.11 SCREENING FOR COLON CANCER: Status: RESOLVED | Noted: 2022-09-29 | Resolved: 2022-10-29

## 2022-10-31 ENCOUNTER — CARE COORDINATION (OUTPATIENT)
Dept: CARE COORDINATION | Facility: CLINIC | Age: 67
End: 2022-10-31

## 2022-10-31 NOTE — CARE COORDINATION
Ambulatory Care Coordination Note  10/31/2022    ACC: Antione Yip, RN    Ccm outreached to patient. Patient states she is doing well. Patient is getting mammogram today. Patient states no questions or concerns. Ccm discussed that I would outreach next week. Patient agreeable. Offered patient enrollment in the Remote Patient Monitoring (RPM) program for in-home monitoring: NA. Lab Results       None            Care Coordination Interventions    Referral from Primary Care Provider: No  Suggested Interventions and Community Resources          Goals Addressed                   This Visit's Progress     Conditions and Symptoms   On track     I will schedule office visits, as directed by my provider. I will keep my appointment or reschedule if I have to cancel. I will notify my provider of any barriers to my plan of care. I will notify my provider of any symptoms that indicate a worsening of my condition. Barriers: none  Plan for overcoming my barriers: N/A  Confidence: 9/10  Anticipated Goal Completion Date: 12/11/22                Prior to Admission medications    Medication Sig Start Date End Date Taking? Authorizing Provider   Dextromethorphan-guaiFENesin [facility] DM PO) Take by mouth as needed    Historical Provider, MD   acetaminophen (TYLENOL) 500 MG tablet Take 500 mg by mouth every 6 hours as needed for Pain    Historical Provider, MD   vitamin D 25 MCG (1000 UT) CAPS Take 1 capsule by mouth daily    Historical Provider, MD   omeprazole (PRILOSEC) 40 MG delayed release capsule Take 40 mg by mouth daily 7/19/22   Historical Provider, MD   simvastatin (ZOCOR) 40 MG tablet Zocor 40 mg tablet   Take 1 tablet every day by oral route. Historical Provider, MD   venlafaxine (EFFEXOR XR) 150 MG extended release capsule Take 1 capsule by mouth daily 9/21/22   Bill Wen MD   zolpidem (AMBIEN) 10 MG tablet Take 1 tablet by mouth nightly as needed for Sleep for up to 90 days.  9/21/22 12/20/22  Miranda Chandni Hutchinson MD   ALPRAZolam Jaret Vidales) 0.25 MG tablet Take 1 tablet by mouth 2 times daily as needed for Sleep for up to 90 days.  9/21/22 12/20/22  Estella Valero MD   alendronate (FOSAMAX) 70 MG tablet Take 70 mg by mouth every 7 days 6/7/22   Historical Provider, MD       Future Appointments   Date Time Provider Frannie Edmondsi   11/29/2022  1:20 PM Marva Verde MD [facility] [place]   12/15/2022 10:00 AM Estella Valero MD [id number] AMB

## 2022-11-07 ENCOUNTER — CARE COORDINATION (OUTPATIENT)
Dept: CARE COORDINATION | Facility: CLINIC | Age: 67
End: 2022-11-07

## 2022-11-07 NOTE — CARE COORDINATION
Ambulatory Care Management Note        Date/Time:  11/7/2022 2:18 PM    Ccm outreached to patient. No answer at this time, message left. Awaiting response. If no response, ccm will outreach next week.

## 2022-11-14 ENCOUNTER — CARE COORDINATION (OUTPATIENT)
Dept: CARE COORDINATION | Facility: CLINIC | Age: 67
End: 2022-11-14

## 2022-11-14 NOTE — CARE COORDINATION
Ambulatory Care Coordination Note  11/14/2022    ACC: Macario David, RN    Ccm outreached to patient. Patient states she is doing well at this time. Patient states no questions or concerns. Ccm discussed that I would outreach next week. Patient agreeable. Offered patient enrollment in the Remote Patient Monitoring (RPM) program for in-home monitoring: NA. Lab Results       None            Care Coordination Interventions    Referral from Primary Care Provider: No  Suggested Interventions and Community Resources          Goals Addressed                   This Visit's Progress     Conditions and Symptoms   On track     I will schedule office visits, as directed by my provider. I will keep my appointment or reschedule if I have to cancel. I will notify my provider of any barriers to my plan of care. I will notify my provider of any symptoms that indicate a worsening of my condition. Barriers: none  Plan for overcoming my barriers: N/A  Confidence: 9/10  Anticipated Goal Completion Date: 12/11/22                Prior to Admission medications    Medication Sig Start Date End Date Taking? Authorizing Provider   Dextromethorphan-guaiFENesin [facility] DM PO) Take by mouth as needed    Historical Provider, MD   acetaminophen (TYLENOL) 500 MG tablet Take 500 mg by mouth every 6 hours as needed for Pain    Historical Provider, MD   vitamin D 25 MCG (1000 UT) CAPS Take 1 capsule by mouth daily    Historical Provider, MD   omeprazole (PRILOSEC) 40 MG delayed release capsule Take 40 mg by mouth daily 7/19/22   Historical Provider, MD   simvastatin (ZOCOR) 40 MG tablet Zocor 40 mg tablet   Take 1 tablet every day by oral route. Historical Provider, MD   venlafaxine (EFFEXOR XR) 150 MG extended release capsule Take 1 capsule by mouth daily 9/21/22   Umair Fraser MD   zolpidem (AMBIEN) 10 MG tablet Take 1 tablet by mouth nightly as needed for Sleep for up to 90 days.  9/21/22 12/20/22  Umair Fraser MD   ALPRAZolam Hoang Valle) 0.25 MG tablet Take 1 tablet by mouth 2 times daily as needed for Sleep for up to 90 days.  9/21/22 12/20/22  Mamadou Vincent MD   alendronate (FOSAMAX) 70 MG tablet Take 70 mg by mouth every 7 days 6/7/22   Historical Provider, MD       Future Appointments   Date Time Provider Frannie Montiel   11/29/2022  1:20 PM Zachary Harden MD [facility] AMB   12/15/2022 10:00 AM Mamadou Vincent MD [id number] AMB

## 2022-11-22 ENCOUNTER — CARE COORDINATION (OUTPATIENT)
Dept: CARE COORDINATION | Facility: CLINIC | Age: 67
End: 2022-11-22

## 2022-11-22 NOTE — PROGRESS NOTES
Name: Laron Truong      MRN: [id number]       : 1955       Age: 75 y.o. Sex: female        Batool Flores MD       CC:  No chief complaint on file. HPI:  The patient is referred here for a colonoscopy by Dr. Luis Branch. I know the patient from a laparoscopic VSG with hiatal hernia repair done on 18. Pre-op weight was 259 lbs. Weight today is 189 lbs.       Family hx of colon cancer No      Previous colonoscopy Yes   BRBPR No   Melena No   Loss of appetite No   Weight loss No      Change in bowel habits No       HISTORY:    Past Medical History:   Diagnosis Date    Allergic rhinitis     Anxiety     Arthritis     osteo    Asthma     as a child    Celiac disease     denies    COVID-19 vaccine series completed 3/30/21, 21    Moderna     Depression     DVT (deep vein thrombosis) in pregnancy     not in pregnancy     Dysthymic disorder     Esophageal reflux     daily med for control     Exophthalmos     Fibromyalgia     GERD (gastroesophageal reflux disease)     prontonix daily     Hiatal hernia     Hot flashes     Hypercholesterolemia     Hyperlipidemia     on med for control     Hypertension     no meds     IBS (irritable bowel syndrome)     ANTHONY (iron deficiency anemia)     HX    Impaired fasting glucose     Liver disease     \"liver cyst\"    Lumbago     Malaise and fatigue     Morbid obesity (HCC)     BMI=32.3    Nonallopathic lesion of sacral region     LEE (obstructive sleep apnea)     no c-pap; not since weight loss     Osteoarthritis     Osteoporosis     Osteoporosis     Other speech disturbances     PUD (peptic ulcer disease)     denies    Pulmonary embolism ([facility] [address]     took blood thinner for 3 months     Pulmonary hypertension, mild (HCC)     RA (rheumatoid arthritis) (HCC)     RLS (restless legs syndrome)     HX    Sarcoidosis     followed by [facility]     Sciatic neuralgia     Tarsal tunnel syndrome     Urinary incontinence     Wears glasses     Wears partial Tobacco Use    Smoking status: Never    Smokeless tobacco: Never   Vaping Use    Vaping Use: Never used   Substance Use Topics    Alcohol use: Yes     Alcohol/week: 11.0 standard drinks    Drug use: Not Currently     Types: Marijuana Archie Hutson)     Family History   Problem Relation Age of Onset    Other Other         fam hx of liver    Cancer Sister         pancreatic    Cancer Other         lung     Colon Cancer Other     Cancer Father         colon ca    Diabetes Father     Pneumonia Mother     Breast Cancer Neg Hx      . Allergies   Allergen Reactions    Codeine Hives       Physical Exam:     There were no vitals taken for this visit. General: Alert, oriented, cooperative female in no acute distress. Resp: Breathing is  non-labored. Lungs clear to auscultation without wheezing or rhonchi   CV: RRR. No murmurs, rubs or gallops appreciated. Abd: soft non-tender and non-distended without peritoneal signs. +bs    Extremities: No clubbing, cyanosis or edema. Strength intact. Assessment/Plan:  Tarik Navas is a 75 y.o. female who is here for a colonoscopy as a screening tool. 1. Off ASA for one week, if on aspirin    2. Bowel prep    3. Colonoscopy with MAC. I went through the risks of bleeding, perforation of the colon and cardiopulmonary problems that can develop with the use of anesthesia.     Deidre Roche MD    FACS   11/22/2022  3:10 PM

## 2022-11-22 NOTE — CARE COORDINATION
Ambulatory Care Coordination Note  11/22/2022    ACC: Jarek Lerner, JR    Ccm outreached to patient. Patient states she is doing well at this time. Patient states no questions or concerns. Ccm dicussed that I would outreach next week. Patient agreeable. Offered patient enrollment in the Remote Patient Monitoring (RPM) program for in-home monitoring: NA. Lab Results       None            Care Coordination Interventions    Referral from Primary Care Provider: No  Suggested Interventions and Community Resources          Goals Addressed                   This Visit's Progress     Conditions and Symptoms   On track     I will schedule office visits, as directed by my provider. I will keep my appointment or reschedule if I have to cancel. I will notify my provider of any barriers to my plan of care. I will notify my provider of any symptoms that indicate a worsening of my condition. Barriers: none  Plan for overcoming my barriers: N/A  Confidence: 9/10  Anticipated Goal Completion Date: 12/11/22                Prior to Admission medications    Medication Sig Start Date End Date Taking? Authorizing Provider   Dextromethorphan-guaiFENesin [facility] DM PO) Take by mouth as needed    Historical Provider, MD   acetaminophen (TYLENOL) 500 MG tablet Take 500 mg by mouth every 6 hours as needed for Pain    Historical Provider, MD   vitamin D 25 MCG (1000 UT) CAPS Take 1 capsule by mouth daily    Historical Provider, MD   omeprazole (PRILOSEC) 40 MG delayed release capsule Take 40 mg by mouth daily 7/19/22   Historical Provider, MD   simvastatin (ZOCOR) 40 MG tablet Zocor 40 mg tablet   Take 1 tablet every day by oral route. Historical Provider, MD   venlafaxine (EFFEXOR XR) 150 MG extended release capsule Take 1 capsule by mouth daily 9/21/22   Alejandro De La Cruz MD   zolpidem (AMBIEN) 10 MG tablet Take 1 tablet by mouth nightly as needed for Sleep for up to 90 days.  9/21/22 12/20/22  Alejandro De La Cruz MD   ALPRAZolam Dianne Barks) 0.25 MG tablet Take 1 tablet by mouth 2 times daily as needed for Sleep for up to 90 days.  9/21/22 12/20/22  Tracey Loredo MD   alendronate (FOSAMAX) 70 MG tablet Take 70 mg by mouth every 7 days 6/7/22   Historical Provider, MD       Future Appointments   Date Time Provider Frannie Tiana   11/29/2022  1:20 PM Sulema Turcios MD [facility] AMB   12/5/2022 10:00 AM GABINO Casiano [facility] AMB   12/15/2022 10:00 AM Tracey Loredo MD [id number] AMB

## 2022-11-28 ENCOUNTER — CARE COORDINATION (OUTPATIENT)
Dept: CARE COORDINATION | Facility: CLINIC | Age: 67
End: 2022-11-28

## 2022-11-28 NOTE — CARE COORDINATION
Ambulatory Care Coordination Note  11/28/2022    ACC: Melissa Ruffin, RN    Ccm outreached to patient. Patient states she is doing well at this time, patient states no questions or concerns. Ccm discussed that I would outreach next week. Patient agreeable. Offered patient enrollment in the Remote Patient Monitoring (RPM) program for in-home monitoring: NA. Lab Results       None            Care Coordination Interventions    Referral from Primary Care Provider: No  Suggested Interventions and Community Resources          Goals Addressed                   This Visit's Progress     Conditions and Symptoms   On track     I will schedule office visits, as directed by my provider. I will keep my appointment or reschedule if I have to cancel. I will notify my provider of any barriers to my plan of care. I will notify my provider of any symptoms that indicate a worsening of my condition. Barriers: none  Plan for overcoming my barriers: N/A  Confidence: 9/10  Anticipated Goal Completion Date: 12/11/22                Prior to Admission medications    Medication Sig Start Date End Date Taking? Authorizing Provider   Dextromethorphan-guaiFENesin [facility] DM PO) Take by mouth as needed    Historical Provider, MD   acetaminophen (TYLENOL) 500 MG tablet Take 500 mg by mouth every 6 hours as needed for Pain    Historical Provider, MD   vitamin D 25 MCG (1000 UT) CAPS Take 1 capsule by mouth daily    Historical Provider, MD   omeprazole (PRILOSEC) 40 MG delayed release capsule Take 40 mg by mouth daily 7/19/22   Historical Provider, MD   simvastatin (ZOCOR) 40 MG tablet Zocor 40 mg tablet   Take 1 tablet every day by oral route. Historical Provider, MD   venlafaxine (EFFEXOR XR) 150 MG extended release capsule Take 1 capsule by mouth daily 9/21/22   Sandy Moore MD   zolpidem (AMBIEN) 10 MG tablet Take 1 tablet by mouth nightly as needed for Sleep for up to 90 days.  9/21/22 12/20/22  Sandy Moore MD   ALPRAZolam Jaret Vidales) 0.25 MG tablet Take 1 tablet by mouth 2 times daily as needed for Sleep for up to 90 days.  9/21/22 12/20/22  Estella Valero MD   alendronate (FOSAMAX) 70 MG tablet Take 70 mg by mouth every 7 days 6/7/22   Historical Provider, MD       Future Appointments   Date Time Provider Frannie Tiana   11/29/2022  1:20 PM Marva Verde MD [facility] AM[facility]   12/5/2022 10:00 AM GABINO Quinn [facility] AMB   12/15/2022 10:00 AM Estella Valero MD [id number] AMB

## 2022-11-29 ENCOUNTER — PREP FOR PROCEDURE (OUTPATIENT)
Dept: SURGERY | Age: 67
End: 2022-11-29

## 2022-11-29 ENCOUNTER — OFFICE VISIT (OUTPATIENT)
Dept: SURGERY | Age: 67
End: 2022-11-29

## 2022-11-29 VITALS
BODY MASS INDEX: 31.57 KG/M2 | DIASTOLIC BLOOD PRESSURE: 76 MMHG | WEIGHT: 189.7 LBS | SYSTOLIC BLOOD PRESSURE: 124 MMHG | HEART RATE: 85 BPM

## 2022-11-29 DIAGNOSIS — Z12.11 ENCOUNTER FOR SCREENING COLONOSCOPY: Primary | ICD-10-CM

## 2022-11-29 RX ORDER — M-VIT,TX,IRON,MINS/CALC/FOLIC 27MG-0.4MG
1 TABLET ORAL DAILY
COMMUNITY

## 2022-11-30 ENCOUNTER — TELEPHONE (OUTPATIENT)
Dept: SURGERY | Age: 67
End: 2022-11-30

## 2022-11-30 NOTE — TELEPHONE ENCOUNTER
Spoke w/ patient; confirmed that patient is scheduled for her Colonoscopy on 12/14/22 @ 8:40am at [facility]. I informed patient that she will receive the colonoscopy prep instructions both by mail and [address] Patient informed and verbalized understanding.

## 2022-12-05 ENCOUNTER — CARE COORDINATION (OUTPATIENT)
Dept: CARE COORDINATION | Facility: CLINIC | Age: 67
End: 2022-12-05

## 2022-12-05 NOTE — CARE COORDINATION
Homer Lujan MD   zolpidem (AMBIEN) 10 MG tablet Take 1 tablet by mouth nightly as needed for Sleep for up to 90 days. 9/21/22 12/20/22  Homer Lujan MD   ALPRAZolam Lenice Edelson) 0.25 MG tablet Take 1 tablet by mouth 2 times daily as needed for Sleep for up to 90 days.  9/21/22 12/20/22  Homer Lujan MD   alendronate (FOSAMAX) 70 MG tablet Take 70 mg by mouth every 7 days 6/7/22   Historical Provider, MD       Future Appointments   Date Time Provider Frannie Montiel   12/15/2022 10:00 AM Homer Lujan MD [id number] AMB

## 2022-12-06 NOTE — H&P
Name: Roseanne Barnes      MRN: [id number]       : 1955       Age: 75 y.o. Sex: female        Yobani Goetz MD       CC:  No chief complaint on file. HPI:  The patient is referred here for a colonoscopy by Dr. Percy Mathew. I know the patient from a laparoscopic VSG with hiatal hernia repair done on 18. Pre-op weight was 259 lbs. Weight today is 189 lbs.       Family hx of colon cancer No      Previous colonoscopy Yes   BRBPR No   Melena No   Loss of appetite No   Weight loss No      Change in bowel habits No       HISTORY:    Past Medical History:   Diagnosis Date    Allergic rhinitis     Anxiety     Arthritis     osteo    Asthma     as a child    Celiac disease     denies    COVID-19 vaccine series completed 3/30/21, 21    Moderna     Depression     DVT (deep vein thrombosis) in pregnancy     not in pregnancy     Dysthymic disorder     Esophageal reflux     daily med for control     Exophthalmos     Fibromyalgia     GERD (gastroesophageal reflux disease)     prontonix daily     Hiatal hernia     Hot flashes     Hypercholesterolemia     Hyperlipidemia     on med for control     Hypertension     no meds     IBS (irritable bowel syndrome)     ANTHONY (iron deficiency anemia)     HX    Impaired fasting glucose     Liver disease     \"liver cyst\"    Lumbago     Malaise and fatigue     Morbid obesity (HCC)     BMI=32.3    Nonallopathic lesion of sacral region     LEE (obstructive sleep apnea)     no c-pap; not since weight loss     Osteoarthritis     Osteoporosis     Osteoporosis     Other speech disturbances     PUD (peptic ulcer disease)     denies    Pulmonary embolism ([facility] [address]     took blood thinner for 3 months     Pulmonary hypertension, mild (HCC)     RA (rheumatoid arthritis) (HCC)     RLS (restless legs syndrome)     HX    Sarcoidosis     [facility]     Sciatic neuralgia     Tarsal tunnel syndrome     Urinary incontinence     Wears glasses     Wears partial dentures      Past Surgical History:   Procedure Laterality Date    BREAST REDUCTION SURGERY      CARPAL TUNNEL RELEASE Right 1997    2 x    CHOLECYSTECTOMY      GASTRECTOMY  04/17/2018    gastric sleeve     HYSTERECTOMY (CERVIX STATUS UNKNOWN)  1978    IR IVC FILTER PLACEMENT W IMAGING  4/13/2018    IR IVC FILTER PLACEMENT W IMAGING 4/13/2018 [facility] RADIOLOGY SPECIALS    IR IVC FILTER RETRIEVAL  5/14/2018    IR IVC FILTER RETRIEVAL 5/14/2018 [facility] RADIOLOGY SPECIALS    ORTHOPEDIC SURGERY      right knee, right toe, right elbow     OTHER SURGICAL HISTORY  04/13/2018    VENA CAVA FILTER INSERTION     OTHER SURGICAL HISTORY  05/14/2018    VENA CAVA FILTER REMOVAL     ROTATOR CUFF REPAIR Right 2018    TONSILLECTOMY      as a child    TOTAL KNEE ARTHROPLASTY Right 08/19/2021     Prior to Admission medications    Medication Sig Start Date End Date Taking? Authorizing Provider   Multiple Vitamins-Minerals (THERAPEUTIC MULTIVITAMIN-MINERALS) tablet Take 1 tablet by mouth daily    Historical Provider, MD Pinedathorphan-guaiFENesin [facility] [place] DM PO) Take by mouth as needed  Patient not taking: Reported on 11/29/2022    Historical Provider, MD   acetaminophen (TYLENOL) 500 MG tablet Take 500 mg by mouth every 6 hours as needed for Pain    Historical Provider, MD   vitamin D 25 MCG (1000 UT) CAPS Take 1 capsule by mouth daily    Historical Provider, MD   omeprazole (PRILOSEC) 40 MG delayed release capsule Take 40 mg by mouth daily 7/19/22   Historical Provider, MD   simvastatin (ZOCOR) 40 MG tablet Zocor 40 mg tablet   Take 1 tablet every day by oral route. Historical Provider, MD   venlafaxine (EFFEXOR XR) 150 MG extended release capsule Take 1 capsule by mouth daily 9/21/22   Erica Gill MD   zolpidem (AMBIEN) 10 MG tablet Take 1 tablet by mouth nightly as needed for Sleep for up to 90 days.  9/21/22 12/20/22  Erica Gill MD   ALPRAZolam Albina Dove) 0.25 MG tablet Take 1 tablet by mouth 2 times daily as needed for Sleep for up to 90 days. 9/21/22 12/20/22  Melva Madison MD   alendronate (FOSAMAX) 70 MG tablet Take 70 mg by mouth every 7 days 6/7/22   Historical Provider, MD     Social History     Tobacco Use    Smoking status: Never    Smokeless tobacco: Never   Vaping Use    Vaping Use: Never used   Substance Use Topics    Alcohol use: Yes     Alcohol/week: 11.0 standard drinks    Drug use: Not Currently     Types: Marijuana Garrel Calender)     Family History   Problem Relation Age of Onset    Other Other         fam hx of liver    Cancer Sister         pancreatic    Cancer Other         lung     Colon Cancer Other     Cancer Father         colon ca    Diabetes Father     Pneumonia Mother     Breast Cancer Neg Hx      . Allergies   Allergen Reactions    Codeine Hives       Physical Exam:     There were no vitals taken for this visit. General: Alert, oriented, cooperative female in no acute distress. Resp: Breathing is  non-labored. Lungs clear to auscultation without wheezing or rhonchi   CV: RRR. No murmurs, rubs or gallops appreciated. Abd: soft non-tender and non-distended without peritoneal signs. +bs    Extremities: No clubbing, cyanosis or edema. Strength intact. Assessment/Plan:  Dmitry Beckford is a 75 y.o. female who is here for a colonoscopy as a screening tool. 1. Off ASA for one week, if on aspirin    2. Bowel prep    3. Colonoscopy with MAC. I went through the risks of bleeding, perforation of the colon and cardiopulmonary problems that can develop with the use of anesthesia.     Yazmin Stevens MD    FACS   12/6/2022  5:36 PM

## 2022-12-09 ENCOUNTER — TELEPHONE (OUTPATIENT)
Dept: SURGERY | Age: 67
End: 2022-12-09

## 2022-12-12 ENCOUNTER — CARE COORDINATION (OUTPATIENT)
Dept: CARE COORDINATION | Facility: CLINIC | Age: 67
End: 2022-12-12

## 2022-12-12 NOTE — CARE COORDINATION
Ambulatory Care Coordination Note  12/12/2022    ACC: Braulio Henderson, RN    Ccm outreached to patient. Patient states she is doing well at this time. Patient states no questions or concerns. Ccm discussed that I would outreach next week. Patient agreeable. Offered patient enrollment in the Remote Patient Monitoring (RPM) program for in-home monitoring: NA. Lab Results       None            Care Coordination Interventions    Referral from Primary Care Provider: No  Suggested Interventions and Community Resources          Goals Addressed                   This Visit's Progress     Conditions and Symptoms   On track     I will schedule office visits, as directed by my provider. I will keep my appointment or reschedule if I have to cancel. I will notify my provider of any barriers to my plan of care. I will notify my provider of any symptoms that indicate a worsening of my condition. Barriers: none  Plan for overcoming my barriers: N/A  Confidence: 9/10  Anticipated Goal Completion Date: 12/11/22                Prior to Admission medications    Medication Sig Start Date End Date Taking?  Authorizing Provider   Multiple Vitamins-Minerals (THERAPEUTIC MULTIVITAMIN-MINERALS) tablet Take 1 tablet by mouth daily    Historical Provider, MD   Dextromethorphan-guaiFENesin ([address] DM PO) Take by mouth as needed  Patient not taking: No sig reported    Historical Provider, MD   acetaminophen (TYLENOL) 500 MG tablet Take 500 mg by mouth every 6 hours as needed for Pain    Historical Provider, MD   vitamin D 25 MCG (1000 UT) CAPS Take 1 capsule by mouth daily    Historical Provider, MD   omeprazole (PRILOSEC) 40 MG delayed release capsule Take 40 mg by mouth daily 7/19/22   Historical Provider, MD   simvastatin (ZOCOR) 40 MG tablet nightly    Historical Provider, MD   venlafaxine (EFFEXOR XR) 150 MG extended release capsule Take 1 capsule by mouth daily 9/21/22   Viri Valverde MD   zolpidem (AMBIEN) 10 MG tablet Take 1 tablet by mouth nightly as needed for Sleep for up to 90 days. 9/21/22 12/20/22  Jade New MD   ALPRAZolam Susan Regulus) 0.25 MG tablet Take 1 tablet by mouth 2 times daily as needed for Sleep for up to 90 days.  9/21/22 12/20/22  Jade New MD   alendronate (FOSAMAX) 70 MG tablet Take 70 mg by mouth every 7 days 6/7/22   Historical Provider, MD       Future Appointments   Date Time Provider Frannie Montiel   12/15/2022 10:00 AM Jade New MD [id number] AMB

## 2022-12-12 NOTE — PERIOP NOTE
Patient verified name, , and procedure. Type: 1a; abbreviated assessment per anesthesia guidelines    Labs per anesthesia: none    Instructed pt that they will be notified the day before their procedure by the GI Lab for time of arrival if their procedure is  [address] and Pre-op for [address]. Arrival times should be called by 5 pm. If no phone is received the patient should contact their respective hospital. The GI lab telephone number is [phone] and ES Pre-op is [phone]. Follow diet and prep instructions per office including NPO status. If patient has NOT received instructions from office patient is advised to call surgeon office, verbalizes understanding. Bath or shower the night before and the am of surgery with non-mositurizing soap. No lotions, oils, powders, cologne on skin. No make up, eye make up or jewelry. Wear loose fitting comfortable, clean clothing. Must have adult present in building the entire time . Medications for the day of procedure omeprazole, venlafaxine. May take Xanax if needed, patient to hold Vitamins and supplements    The following discharge instructions reviewed with patient: medication given during procedure may cause drowsiness for several hours, therefore, do not drive or operate machinery for remainder of the day. You may not drink alcohol on the day of your procedure, please resume regular diet and activity unless otherwise directed. You may experience abdominal distention for several hours that is relieved by the passage of gas. Contact your physician if you have any of the following: fever or chills, severe abdominal pain or excessive amount of bleeding or a large amount when having a bowel movement.  Occasional specks of blood with bowel movement would not be unusual.

## 2022-12-14 ENCOUNTER — ANESTHESIA (OUTPATIENT)
Dept: ENDOSCOPY | Age: 67
End: 2022-12-14
Payer: MEDICARE

## 2022-12-14 ENCOUNTER — HOSPITAL ENCOUNTER (OUTPATIENT)
Age: 67
Setting detail: OUTPATIENT SURGERY
Discharge: HOME | End: 2022-12-14
Attending: SURGERY | Admitting: SURGERY
Payer: MEDICARE

## 2022-12-14 ENCOUNTER — ANESTHESIA EVENT (OUTPATIENT)
Dept: ENDOSCOPY | Age: 67
End: 2022-12-14
Payer: MEDICARE

## 2022-12-14 VITALS
HEIGHT: 65 IN | SYSTOLIC BLOOD PRESSURE: 121 MMHG | DIASTOLIC BLOOD PRESSURE: 57 MMHG | BODY MASS INDEX: 31.49 KG/M2 | TEMPERATURE: 98.6 F | WEIGHT: 189 LBS | HEART RATE: 85 BPM | OXYGEN SATURATION: 100 % | RESPIRATION RATE: 16 BRPM

## 2022-12-14 PROCEDURE — 3700000000 HC ANESTHESIA ATTENDED CARE: Performed by: SURGERY

## 2022-12-14 PROCEDURE — 7100000010 HC PHASE II RECOVERY - FIRST 15 MIN: Performed by: SURGERY

## 2022-12-14 PROCEDURE — 2500000003 HC RX 250 WO HCPCS

## 2022-12-14 PROCEDURE — 3609027000 HC COLONOSCOPY: Performed by: SURGERY

## 2022-12-14 PROCEDURE — 3700000001 HC ADD 15 MINUTES (ANESTHESIA): Performed by: SURGERY

## 2022-12-14 PROCEDURE — 2580000003 HC RX 258: Performed by: ANESTHESIOLOGY

## 2022-12-14 PROCEDURE — 7100000011 HC PHASE II RECOVERY - ADDTL 15 MIN: Performed by: SURGERY

## 2022-12-14 PROCEDURE — 6360000002 HC RX W HCPCS

## 2022-12-14 PROCEDURE — 2709999900 HC NON-CHARGEABLE SUPPLY: Performed by: SURGERY

## 2022-12-14 RX ORDER — PHENYLEPHRINE HYDROCHLORIDE 10 MG/ML
INJECTION INTRAVENOUS PRN
Status: DISCONTINUED | OUTPATIENT
Start: 2022-12-14 | End: 2022-12-14 | Stop reason: SDUPTHER

## 2022-12-14 RX ORDER — PROPOFOL 10 MG/ML
INJECTION, EMULSION INTRAVENOUS CONTINUOUS PRN
Status: DISCONTINUED | OUTPATIENT
Start: 2022-12-14 | End: 2022-12-14 | Stop reason: SDUPTHER

## 2022-12-14 RX ORDER — SODIUM CHLORIDE, SODIUM LACTATE, POTASSIUM CHLORIDE, CALCIUM CHLORIDE 600; 310; 30; 20 MG/100ML; MG/100ML; MG/100ML; MG/100ML
INJECTION, SOLUTION INTRAVENOUS CONTINUOUS
Status: DISCONTINUED | OUTPATIENT
Start: 2022-12-14 | End: 2022-12-14 | Stop reason: HOSPADM

## 2022-12-14 RX ORDER — PROPOFOL 10 MG/ML
INJECTION, EMULSION INTRAVENOUS PRN
Status: DISCONTINUED | OUTPATIENT
Start: 2022-12-14 | End: 2022-12-14 | Stop reason: SDUPTHER

## 2022-12-14 RX ORDER — LIDOCAINE HYDROCHLORIDE 20 MG/ML
INJECTION, SOLUTION EPIDURAL; INFILTRATION; INTRACAUDAL; PERINEURAL PRN
Status: DISCONTINUED | OUTPATIENT
Start: 2022-12-14 | End: 2022-12-14 | Stop reason: SDUPTHER

## 2022-12-14 RX ADMIN — PHENYLEPHRINE HYDROCHLORIDE 100 MCG: 10 INJECTION INTRAVENOUS at 09:10

## 2022-12-14 RX ADMIN — PROPOFOL 80 MG: 10 INJECTION, EMULSION INTRAVENOUS at 08:41

## 2022-12-14 RX ADMIN — PHENYLEPHRINE HYDROCHLORIDE 100 MCG: 10 INJECTION INTRAVENOUS at 09:01

## 2022-12-14 RX ADMIN — LIDOCAINE HYDROCHLORIDE 40 MG: 20 INJECTION, SOLUTION EPIDURAL; INFILTRATION; INTRACAUDAL; PERINEURAL at 08:41

## 2022-12-14 RX ADMIN — PROPOFOL 20 MG: 10 INJECTION, EMULSION INTRAVENOUS at 08:42

## 2022-12-14 RX ADMIN — PROPOFOL 200 MCG/KG/MIN: 10 INJECTION, EMULSION INTRAVENOUS at 08:43

## 2022-12-14 RX ADMIN — PHENYLEPHRINE HYDROCHLORIDE 100 MCG: 10 INJECTION INTRAVENOUS at 08:50

## 2022-12-14 RX ADMIN — SODIUM CHLORIDE, POTASSIUM CHLORIDE, SODIUM LACTATE AND CALCIUM CHLORIDE: 600; 310; 30; 20 INJECTION, SOLUTION INTRAVENOUS at 08:11

## 2022-12-14 ASSESSMENT — PAIN - FUNCTIONAL ASSESSMENT: PAIN_FUNCTIONAL_ASSESSMENT: NONE - DENIES PAIN

## 2022-12-14 NOTE — INTERVAL H&P NOTE
Update History & Physical    The patient's History and Physical of 12/6/22 was reviewed with the patient and I examined the patient. There was no change. The surgical site was confirmed by the patient and me. Plan: The risks, benefits, expected outcome, and alternative to the recommended procedure have been discussed with the patient. Patient understands and wants to proceed with the procedure.      Electronically signed by Rosy Colon MD on 12/14/2022 at 7:40 AM

## 2022-12-14 NOTE — ANESTHESIA POSTPROCEDURE EVALUATION
Department of Anesthesiology  Postprocedure Note    Patient: Twyla Jason  MRN: [id number]  [place]: 1955  Date of evaluation: 12/14/2022      Procedure Summary     Date: 12/14/22 Room / Location: [facility] ENDO 05 / [facility] ENDOSCOPY    Anesthesia Start: 0274 Anesthesia Stop: 3228    Procedure: COLORECTAL CANCER SCREENING, NOT HIGH RISK Diagnosis:       Encounter for screening colonoscopy      (Encounter for screening colonoscopy [Z12.11])    Surgeons: Selina Anderson MD Responsible Provider: Rebel Hodges MD    Anesthesia Type: MAC, TIVA ASA Status: 2          Anesthesia Type: MAC, TIVA    Lyndsey Phase I: Lyndsey Score: 10    Lyndsey Phase II: Lyndsey Score: 8      Anesthesia Post Evaluation    Patient location during evaluation: PACU  Patient participation: complete - patient participated  Level of consciousness: awake and alert  Airway patency: patent  Nausea & Vomiting: no nausea and no vomiting  Complications: no  Cardiovascular status: hemodynamically stable  Respiratory status: acceptable, nonlabored ventilation and spontaneous ventilation  Hydration status: euvolemic  Comments: /63   Pulse 82   Temp 98.6 °F (37 °C)   Resp 16   Ht 5' 5\" (1.651 m)   Wt 189 lb (85.7 kg)   SpO2 97%   BMI 31.45 kg/m²     Multimodal analgesia pain management approach

## 2022-12-14 NOTE — OP NOTE
[facility]  OPERATIVE REPORT    Name:  Raz Knutson  MR#:  [id number]  :  1955  ACCOUNT #:  [de-identified]  DATE OF SERVICE:  2022      PREOPERATIVE DIAGNOSIS:  Request for screening colonoscopy. POSTOPERATIVE DIAGNOSES:  1. Good bowel prep. 2.  No masses, polyps or mucosal abnormalities noted. PROCEDURE PERFORMED:  Colonoscopy. SURGEON:  Primo Condon MD.    ASSISTANT:  None. ANESTHESIA:  MAC.    COMPLICATIONS:  None. SPECIMENS REMOVED:  None. IMPLANTS:  None. ESTIMATED BLOOD LOSS:  None. DRAINS:  None. HISTORY:  This is a 50-year-old female who I know from vertical sleeve gastrectomy with hiatal hernia repair done on 2018. She was seen back in the office as a referral from Dr. Angela Correa. She was referred back for a colonoscopy. I went through risks and benefits; the risks being bleeding, infection, anesthesia, and perforation of colon. She was agreeable, signed the consent form. PROCEDURE:  The patient was seen in the preop area at the [facility] and transported to room #5 [facility]. She was placed on a stretcher in left lateral decubitus position. Oxygen via nasal cannula administered. The O2 sats and vital signs were monitored throughout the procedure. Dr. Lakeisha Ceballos and the nurse anesthetist then performed monitored anesthesia care. Once the side effect had taken hold, adult colonoscope was advanced through the anus all the way to the cecum. Cecum was identified with the ileocecal valve and cecal folds. External compression of right lower quadrant corresponded to an indentation seen with the scope. Scope was slowly withdrawn over a six-minute period of time. There were no lesions or mucosal abnormalities noted in the cecum, ascending colon, transverse colon, or descending colon.     Scope was brought back to the rectum to the sigmoid, which was likewise normal although somewhat tortuous and then brought back to the rectum. Scope was retroflexed. No lesions were noted on retroflexion. Digital rectal exam revealed good sphincter tone. No masses or abnormalities palpated. FINDINGS:  1. Good bowel prep. 2.  No masses, polyps or mucosal abnormalities noted. PLAN:  Repeat again in 10 years or sooner if symptoms develop.         MD CRISTIANO [email or web address]  D:  12/14/2022 9:18  T:  12/14/2022 13:29  JOB #:  [id number]

## 2022-12-14 NOTE — DISCHARGE INSTRUCTIONS
Gastrointestinal Colonoscopy/Flexible Sigmoidoscopy - Lower Exam Discharge Instructions    Call Dr. MOTTA [facility] at [phone] for any problems or questions. Contact the doctors office for follow up appointment as directed. Medication may cause drowsiness for several hours, therefore:  Do not drive or operate machinery for reminder of the day. No alcohol today. Do not make any important or legal decisions for 24 hours. Do not sign any legal documents for 24 hours. Ordinarily, you may resume regular diet and activity after exam unless otherwise specified by your physician. Because of air put into your colon during exam, you may experience some abdominal distension, relieved by the passage of gas, for several hours. Contact your physician if you have any of the following:  Excessive amount of bleeding - large amount when having a bowel movement. Occasional specks of blood with bowel movement would not be unusual.  Severe abdominal pain  Fever or Chills      Any additional instructions:    Repeat colonoscopy in 10 years.

## 2022-12-14 NOTE — ANESTHESIA PRE PROCEDURE
Department of Anesthesiology  Preprocedure Note       Name:  Valerie Carson   Age:  75 y.o.  :  1955                                          MRN:  [id number]         Date:  2022      Surgeon: Edita Servin):  Yeyo Chinchilla MD    Procedure: Procedure(s):  COLORECTAL CANCER SCREENING, NOT HIGH RISK    Medications prior to admission:   Prior to Admission medications    Medication Sig Start Date End Date Taking? Authorizing Provider   Multiple Vitamins-Minerals (THERAPEUTIC MULTIVITAMIN-MINERALS) tablet Take 1 tablet by mouth daily    Historical Provider, MD   Dextromethorphan-guaiFENesin ([place] [address] DM PO) Take by mouth as needed  Patient not taking: No sig reported    Historical Provider, MD   acetaminophen (TYLENOL) 500 MG tablet Take 500 mg by mouth every 6 hours as needed for Pain    Historical Provider, MD   vitamin D 25 MCG (1000 UT) CAPS Take 1 capsule by mouth daily    Historical Provider, MD   omeprazole (PRILOSEC) 40 MG delayed release capsule Take 40 mg by mouth daily 22   Historical Provider, MD   simvastatin (ZOCOR) 40 MG tablet nightly    Historical Provider, MD   venlafaxine (EFFEXOR XR) 150 MG extended release capsule Take 1 capsule by mouth daily 22   Radha Otto MD   zolpidem (AMBIEN) 10 MG tablet Take 1 tablet by mouth nightly as needed for Sleep for up to 90 days. 22  Radha Otto MD   ALPRAZolam Manda Glover) 0.25 MG tablet Take 1 tablet by mouth 2 times daily as needed for Sleep for up to 90 days. 22  Radha Otto MD   alendronate (FOSAMAX) 70 MG tablet Take 70 mg by mouth every 7 days 22   Historical Provider, MD       Current medications:    No current facility-administered medications for this encounter. Allergies:     Allergies   Allergen Reactions    Codeine Hives       Problem List:    Patient Active Problem List   Diagnosis Code    Hot flashes R23.2    LEE (obstructive sleep apnea) G47.33    Pulmonary sarcoidosis (HCC) D86.0    History of pulmonary embolism Z86.711    Medicare annual wellness visit, subsequent Z00.00    Celiac disease K90.0    Recurrent major depressive disorder ([facility] [address] F33.9    Closed fracture of greater tuberosity of right humerus S42.251A    Liver cyst K76.89    Tear of right rotator cuff M75.101    Cervicalgia M54.2    Left shoulder pain M25.512    Impaired fasting glucose R73.01    RLS (restless legs syndrome) G25.81    Allergic rhinitis J30.9    Lumbago M54.50    Tarsal tunnel syndrome G57.50    Acute seasonal allergic rhinitis due to pollen J30.1    IBS (irritable bowel syndrome) K58.9    History of pulmonary embolus (PE) Z86.711    Preoperative clearance Z01.818    Nonallopathic lesion of sacral region M99.9    Osteoarthritis M19.90    RA (rheumatoid arthritis) (HCC) M06.9    Malaise and fatigue R53.81, R53.83    ANTHONY (iron deficiency anemia) D50.9    Environmental allergies Z91.09    Acute right-sided low back pain with sciatica M54.40    Sinusitis J32.9    Noncompliance with CPAP treatment Z91.14    Primary insomnia F51.01    Radiculopathy affecting upper extremity M54.10    Dysthymic disorder F34.1    Mixed hyperlipidemia E78.2    Other speech disturbances R47.89    ACP (advance care planning) Z71.89    Generalized anxiety disorder F41.1    Lesion of liver K76.9    Asthma J45.909    Arthritis of right knee M17.11    Osteoarthritis of right acromioclavicular joint M19.011    Gastroesophageal reflux disease K21.9    History of DVT (deep vein thrombosis) Z86.718    Left elbow pain M25.522    Contusion of left shoulder S40.012A    Fall W19. Trang Hilt    Hypertension I10    Chronic obstructive pulmonary disease (HCC) J44.9    Dietary counseling and surveillance Z71.3    Pulmonary hypertension, mild (HCC) I27.20    Fibromyalgia M79.7    Arthritis, hip M16.10    Opioid use, unspecified with unspecified opioid-induced disorder ([facility] [address] F11.99    BMI 30.0-30.9,adult Z68.30    Obesity E66.9    Hyperlipidemia E78.5    S/P gastric bypass Z98.84    Osteoporosis M81.0    Status post total knee replacement, right Z96.651    Pharyngitis J02.9    Screening mammogram for breast cancer Z12.31       Past Medical History:        Diagnosis Date    Allergic rhinitis     Anxiety     Arthritis     osteo    Asthma     as a child    Celiac disease     denies    COVID-19 vaccine series completed 3/30/21, 4/29/21    Moderna     Depression     DVT (deep venous thrombosis) ([facility] [address] 2015    Dysthymic disorder     Esophageal reflux     daily med for control     Exophthalmos     Fibromyalgia     GERD (gastroesophageal reflux disease)     daily medication    Hiatal hernia     surgically repaired    History of anemia     Hot flashes     Hypercholesterolemia     Hyperlipidemia     on med for control     Hypertension     no meds     IBS (irritable bowel syndrome)     ANTHONY (iron deficiency anemia)     HX    Impaired fasting glucose     Liver disease     \"liver cyst\"    Lumbago     Malaise and fatigue     Morbid obesity (HCC)     BMI=32.3    Nonallopathic lesion of sacral region     LEE (obstructive sleep apnea)     no c-pap; not since weight loss     Osteoarthritis     Osteoporosis     Osteoporosis     Other speech disturbances     PUD (peptic ulcer disease)     denies    Pulmonary embolism ([facility] [address] 2015    took blood thinner for 3 months     Pulmonary hypertension, mild (HCC)     RA (rheumatoid arthritis) (HCC)     RLS (restless legs syndrome)     HX    Sarcoidosis     followed [facility]     Sciatic neuralgia     Tarsal tunnel syndrome     Urinary incontinence     Wears glasses     Wears partial dentures        Past Surgical History:        Procedure Laterality Date    BREAST REDUCTION SURGERY      CARPAL TUNNEL RELEASE Right 1997    2 x    CHOLECYSTECTOMY      GASTRECTOMY  04/17/2018    gastric sleeve     HYSTERECTOMY (CERVIX STATUS UNKNOWN)  1978    IR IVC FILTER PLACEMENT W IMAGING  04/13/2018    IR IVC FILTER PLACEMENT W IMAGING 4/13/2018 [facility]    IR IVC FILTER RETRIEVAL  05/14/2018    IR IVC FILTER RETRIEVAL 5/14/2018 [facility]    ORTHOPEDIC SURGERY      right knee, right toe, right elbow     OTHER SURGICAL HISTORY  04/13/2018    VENA CAVA FILTER INSERTION     OTHER SURGICAL HISTORY  05/14/2018    VENA CAVA FILTER REMOVAL     ROTATOR CUFF REPAIR Right 2018    TONSILLECTOMY      as a child    TOTAL KNEE ARTHROPLASTY Right 08/19/2021    WRIST SURGERY Right        Social History:    Social History     Tobacco Use    Smoking status: Never    Smokeless tobacco: Never   Substance Use Topics    Alcohol use: Yes     Alcohol/week: 11.0 standard drinks                                Counseling given: Not Answered      Vital Signs (Current):   Vitals:    12/12/22 0805   Weight: 189 lb (85.7 kg)   Height: 5' 5\" (1.651 m)                                              BP Readings from Last 3 Encounters:   11/29/22 124/76   10/10/22 133/64   09/20/22 120/72       NPO Status: Time of last liquid consumption: 0530                        Time of last solid consumption: 2300                        Date of last liquid consumption: 12/14/22                        Date of last solid food consumption: 12/12/22    BMI:   Wt Readings from Last 3 Encounters:   12/12/22 189 lb (85.7 kg)   11/29/22 189 lb 11.2 oz (86 kg)   10/10/22 185 lb (83.9 kg)     Body mass index is 31.45 kg/m².     CBC:   Lab Results   Component Value Date/Time    WBC 9.5 10/10/2022 11:57 AM    RBC 5.08 10/10/2022 11:57 AM    HGB 13.7 10/10/2022 11:57 AM    HCT 43.1 10/10/2022 11:57 AM    MCV 84.8 10/10/2022 11:57 AM    RDW 13.4 10/10/2022 11:57 AM     10/10/2022 11:57 AM       CMP:   Lab Results   Component Value Date/Time     10/10/2022 11:57 AM    K 3.8 10/10/2022 11:57 AM     10/10/2022 11:57 AM    CO2 29 10/10/2022 11:57 AM    BUN 16 10/10/2022 11:57 AM CREATININE 0.85 10/10/2022 11:57 AM    GFRAA >60 08/03/2021 11:50 AM    AGRATIO 1.2 04/29/2021 09:12 AM    LABGLOM >60 10/10/2022 11:57 AM    GLUCOSE 100 10/10/2022 11:57 AM    PROT 7.7 10/10/2022 11:57 AM    CALCIUM 9.2 10/10/2022 11:57 AM    BILITOT 1.2 10/10/2022 11:57 AM    ALKPHOS 101 10/10/2022 11:57 AM    ALKPHOS 82 04/29/2021 09:12 AM    AST 23 10/10/2022 11:57 AM    ALT 28 10/10/2022 11:57 AM       POC Tests: No results for input(s): POCGLU, POCNA, POCK, POCCL, POCBUN, POCHEMO, POCHCT in the last 72 hours. Coags:   Lab Results   Component Value Date/Time    PROTIME 13.5 08/03/2021 11:50 AM    INR 1.0 08/03/2021 11:50 AM    APTT 32.5 08/03/2021 11:50 AM       HCG (If Applicable): No results found for: PREGTESTUR, PREGSERUM, HCG, HCGQUANT     ABGs: No results found for: PHART, PO2ART, YVY3KIH, UYS2BKF, BEART, B0FIOELK     Type & Screen (If Applicable):  No results found for: LABABO, LABRH    Drug/Infectious Status (If Applicable):  No results found for: HIV, HEPCAB    COVID-19 Screening (If Applicable):   Lab Results   Component Value Date/Time    COVID19 Not detected 09/20/2022 11:45 AM    COVID19 Not Detected 08/16/2021 10:28 AM    COVID19 Performed 08/16/2021 10:28 AM           Anesthesia Evaluation  Patient summary reviewed and Nursing notes reviewed  Airway: Mallampati: II  TM distance: >3 FB   Neck ROM: full  Mouth opening: > = 3 FB   Dental: normal exam         Pulmonary:normal exam  breath sounds clear to auscultation  (+) COPD (Sarcoidosis - asymptomatic):  sleep apnea:                             Cardiovascular:  Exercise tolerance: good (>4 METS),   (+) hypertension:,         Rhythm: regular  Rate: normal                    Neuro/Psych:   (+) psychiatric history:            GI/Hepatic/Renal:   (+) hiatal hernia, GERD:, liver disease:,           Endo/Other:    (+) : arthritis:., .                 Abdominal:             Vascular: negative vascular ROS.          Other Findings: Anesthesia Plan      TIVA     ASA 2       Induction: intravenous. Anesthetic plan and risks discussed with patient.                         Stevie Jamison MD   12/14/2022

## 2022-12-19 ENCOUNTER — CARE COORDINATION (OUTPATIENT)
Dept: CARE COORDINATION | Facility: CLINIC | Age: 67
End: 2022-12-19

## 2022-12-19 NOTE — CARE COORDINATION
Ambulatory Care Management Note        Date/Time:  12/19/2022 1:11 PM    Ccm outreached to patient. No answer at this time, message left. Awaiting response. If no response, ccm will outreach in 2 weeks. Patient agreeable.

## 2023-01-02 ENCOUNTER — CARE COORDINATION (OUTPATIENT)
Dept: CARE COORDINATION | Facility: CLINIC | Age: 68
End: 2023-01-02

## 2023-01-02 NOTE — CARE COORDINATION
Ambulatory Care Management Note        Date/Time:  1/2/2023 2:51 PM    Ccm outreached to patient. No answer at this time, message left. Awaiting response. If no response, ccm will outreach next week.

## 2023-01-09 ENCOUNTER — CARE COORDINATION (OUTPATIENT)
Dept: CARE COORDINATION | Facility: CLINIC | Age: 68
End: 2023-01-09

## 2023-01-09 ENCOUNTER — TELEMEDICINE (OUTPATIENT)
Dept: PRIMARY CARE CLINIC | Facility: CLINIC | Age: 68
End: 2023-01-09
Payer: MEDICARE

## 2023-01-09 DIAGNOSIS — R05.9 COUGH, UNSPECIFIED TYPE: ICD-10-CM

## 2023-01-09 DIAGNOSIS — D86.0 SARCOIDOSIS OF LUNG (HCC): ICD-10-CM

## 2023-01-09 DIAGNOSIS — M81.0 AGE-RELATED OSTEOPOROSIS WITHOUT CURRENT PATHOLOGICAL FRACTURE: ICD-10-CM

## 2023-01-09 DIAGNOSIS — E78.5 HYPERLIPIDEMIA, UNSPECIFIED HYPERLIPIDEMIA TYPE: ICD-10-CM

## 2023-01-09 DIAGNOSIS — Z71.3 DIETARY COUNSELING AND SURVEILLANCE: ICD-10-CM

## 2023-01-09 DIAGNOSIS — K21.00 GASTRO-ESOPHAGEAL REFLUX DISEASE WITH ESOPHAGITIS, WITHOUT BLEEDING: ICD-10-CM

## 2023-01-09 DIAGNOSIS — J44.9 CHRONIC OBSTRUCTIVE PULMONARY DISEASE, UNSPECIFIED COPD TYPE (HCC): ICD-10-CM

## 2023-01-09 DIAGNOSIS — R09.89 CHEST CONGESTION: ICD-10-CM

## 2023-01-09 DIAGNOSIS — J20.9 ACUTE BRONCHITIS, UNSPECIFIED ORGANISM: Primary | ICD-10-CM

## 2023-01-09 DIAGNOSIS — E66.9 OBESITY, UNSPECIFIED CLASSIFICATION, UNSPECIFIED OBESITY TYPE, UNSPECIFIED WHETHER SERIOUS COMORBIDITY PRESENT: ICD-10-CM

## 2023-01-09 DIAGNOSIS — G47.33 OBSTRUCTIVE SLEEP APNEA (ADULT) (PEDIATRIC): ICD-10-CM

## 2023-01-09 PROCEDURE — 3023F SPIROM DOC REV: CPT | Performed by: FAMILY MEDICINE

## 2023-01-09 PROCEDURE — G8399 PT W/DXA RESULTS DOCUMENT: HCPCS | Performed by: FAMILY MEDICINE

## 2023-01-09 PROCEDURE — G8484 FLU IMMUNIZE NO ADMIN: HCPCS | Performed by: FAMILY MEDICINE

## 2023-01-09 PROCEDURE — 1036F TOBACCO NON-USER: CPT | Performed by: FAMILY MEDICINE

## 2023-01-09 PROCEDURE — 1090F PRES/ABSN URINE INCON ASSESS: CPT | Performed by: FAMILY MEDICINE

## 2023-01-09 PROCEDURE — 99214 OFFICE O/P EST MOD 30 MIN: CPT | Performed by: FAMILY MEDICINE

## 2023-01-09 PROCEDURE — G8417 CALC BMI ABV UP PARAM F/U: HCPCS | Performed by: FAMILY MEDICINE

## 2023-01-09 PROCEDURE — G8427 DOCREV CUR MEDS BY ELIG CLIN: HCPCS | Performed by: FAMILY MEDICINE

## 2023-01-09 PROCEDURE — 3017F COLORECTAL CA SCREEN DOC REV: CPT | Performed by: FAMILY MEDICINE

## 2023-01-09 PROCEDURE — 1123F ACP DISCUSS/DSCN MKR DOCD: CPT | Performed by: FAMILY MEDICINE

## 2023-01-09 RX ORDER — DOXYCYCLINE HYCLATE 100 MG
100 TABLET ORAL 2 TIMES DAILY
Qty: 20 TABLET | Refills: 0 | Status: SHIPPED | OUTPATIENT
Start: 2023-01-09 | End: 2023-01-19

## 2023-01-09 RX ORDER — PREDNISONE 20 MG/1
20 TABLET ORAL DAILY
Qty: 7 TABLET | Refills: 0 | Status: SHIPPED | OUTPATIENT
Start: 2023-01-09 | End: 2023-01-16

## 2023-01-09 RX ORDER — ALENDRONATE SODIUM 70 MG/1
70 TABLET ORAL
Qty: 4 TABLET | Refills: 5 | Status: SHIPPED | OUTPATIENT
Start: 2023-01-09

## 2023-01-09 RX ORDER — OMEPRAZOLE 40 MG/1
40 CAPSULE, DELAYED RELEASE ORAL DAILY
Qty: 90 CAPSULE | Refills: 0 | Status: SHIPPED | OUTPATIENT
Start: 2023-01-09

## 2023-01-09 ASSESSMENT — PATIENT HEALTH QUESTIONNAIRE - PHQ9
SUM OF ALL RESPONSES TO PHQ QUESTIONS 1-9: 1
1. LITTLE INTEREST OR PLEASURE IN DOING THINGS: 0
SUM OF ALL RESPONSES TO PHQ QUESTIONS 1-9: 1
SUM OF ALL RESPONSES TO PHQ QUESTIONS 1-9: 1
SUM OF ALL RESPONSES TO PHQ9 QUESTIONS 1 & 2: 1
SUM OF ALL RESPONSES TO PHQ QUESTIONS 1-9: 1
2. FEELING DOWN, DEPRESSED OR HOPELESS: 1

## 2023-01-09 NOTE — PROGRESS NOTES
[facility] - KIMBERLY [address] [place] [address], [facility] [address]  Office : [phone]  Fax : [phone]  Pt was seen by synchronous (real-time) audio-video technology   I was at my home office while conducting this encounter  Pt was at home during the visit  Pts  healthcare decision maker is aware that this patient-initiated Telehealth encounter is a billable service, with coverage as determined by her insurance carrier. She is aware that she may receive a bill and has provided verbal consent to proceed:       Subjective: The patient is a 78 y.o. female  who presents for f/u on   productive cough,sorethroat,earache,facial pain  and chest/sinus congestion for few days-worse last 2 days--no fever/cp/sob/wheezing/rash/abd symptoms-pt tried some OTC meds without relief.   Pt was tested positive for covid in dec 2022-no treatment-supportive care  Pt still tests positive for covid in 1/2023  Pts symptoms persistent  No vitals at home-unable to get it at pharmacy         chronic med problems  Hypertension--Pt BP been controlled with meds  Prediabetes -pts blood sugar stable on med  Hyperlipidemia--pts on low carb diet and low fat diet -stable on med  Gerd -stable on diet /med  Sarcoidosis/pulmonary nodule-followed by lung specialist-stable-good lung capacity  Pt seeing psychiatrist now depression-stable  Low back pain much better  HX OF DVT/PE--when pt was pregnant  S/p right knee replacement surgery-doing well              Patient Active Problem List   Diagnosis Code    Osteoporosis 733.0    Celiac disease K90.0    Hot flashes R23.2    RA (rheumatoid arthritis) (HCC) M06.9    ANTHONY (iron deficiency anemia) D50.9    LEE (obstructive sleep apnea) G47.33    Dysthymic disorder F34.1    Nonallopathic lesion of sacral region M99.9    Lumbago M54.50    Allergic rhinitis J30.9    Osteoarthritis M19.90    Malaise and fatigue R53.81, R53.83    Gastroesophageal reflux disease K21.9    Mixed hyperlipidemia E78.2 Pulmonary sarcoidosis (HCC) D86.0    RLS (restless legs syndrome) G25.81    Other speech disturbances R47.89    History of pulmonary embolism Z86.711    Impaired fasting glucose R73.01    Hypertension I10    IBS (irritable bowel syndrome) K58.9    Tarsal tunnel syndrome G57.50    Pulmonary hypertension, mild (HCC) I27.20    Recurrent major depressive disorder (HCC) F33.9    Generalized anxiety disorder F41.1    Liver cyst K76.89    Obesity, Class I, BMI 30.0-34.9 (see actual BMI) E66.9    Asthma J45.909    Closed fracture of greater tuberosity of right humerus S42.251A    Lesion of liver K76.9    Osteoarthritis of right acromioclavicular joint M19.011    Tear of right rotator cuff M75.101    Primary insomnia F51.01    Osteoporosis M81.0    Environmental allergies Z91.09    Arthritis, hip M16.10    S/P gastric bypass Z98.84    Hyperlipidemia E78.5    Chronic obstructive pulmonary disease (HCC) J44.9    Fibromyalgia M79.7    Medicare annual wellness visit, subsequent Z00.00    History of DVT (deep vein thrombosis) Z86.718    Acute seasonal allergic rhinitis due to pollen J30.1    ACP (advance care planning) Z71.89    BMI 30.0-30.9,adult Z68.30    Dietary counseling Z71.3    Acute right-sided low back pain with sciatica M54.40    History of pulmonary embolus (PE) Z86.711    Fall W19. XXXA    Contusion of left shoulder S40.012A    Cervicalgia M54.2    Radiculopathy affecting upper extremity M54.10    Left shoulder pain M25.512    Left elbow pain M25.522    Sinusitis J32.9    Preoperative clearance Z01.818    Noncompliance with CPAP treatment Z91.14    Arthritis of right knee M17.11    Status post total knee replacement, right Z96.651    Opioid use, unspecified with unspecified opioid-induced disorder F11.99       Past Medical History:   Diagnosis Date    Allergic rhinitis     Anxiety     Arthritis     osteo    Asthma     as a child~no inhalers     Celiac disease     denies    COVID-19 vaccine series completed 3/30/21, 4/29/21    Moderna     Depression     DVT (deep vein thrombosis) in pregnancy 2015    not in pregnancy     Dysthymic disorder     Esophageal reflux     daily med for control     Exophthalmos     Fibromyalgia     GERD (gastroesophageal reflux disease)     prontonix daily     Hiatal hernia     Hot flashes     Hypercholesterolemia     Hyperlipidemia     on med for control     Hypertension     no meds     IBS (irritable bowel syndrome)     ANTHONY (iron deficiency anemia)     HX    Impaired fasting glucose     Liver disease     \"liver cyst\"    Lumbago     Malaise and fatigue     Morbid obesity (HCC)     BMI=32.3    Nonallopathic lesion of sacral region     LEE (obstructive sleep apnea)     no c-pap; not since weight loss     Osteoarthritis     Osteoporosis     Osteoporosis     Other speech disturbances     PUD (peptic ulcer disease)     denies    Pulmonary embolism ([address] 2015    took blood thinner for 3 months     Pulmonary hypertension, mild (HCC)     RA (rheumatoid arthritis) (HCC)     RLS (restless legs syndrome)     HX    Sarcoidosis     followed by [facility]     Sciatic neuralgia     Tarsal tunnel syndrome     Urinary incontinence     Wears glasses     Wears partial dentures        Past Surgical History:   Procedure Laterality Date    HX BREAST REDUCTION      HX CARPAL TUNNEL RELEASE Right 1997    2 x    HX CHOLECYSTECTOMY      HX GASTRECTOMY  04/17/2018    gastric sleeve     HX HYSTERECTOMY  1978    HX ORTHOPAEDIC      right knee, right toe, right elbow     HX OTHER SURGICAL  05/14/2018    VENA CAVA FILTER REMOVAL     HX OTHER SURGICAL  04/13/2018    VENA CAVA FILTER INSERTION     HX ROTATOR CUFF REPAIR Right 2018    HX TONSILLECTOMY      as a child       Social History     Socioeconomic History    Marital status:      Spouse name: Not on file    Number of children: Not on file    Years of education: Not on file    Highest education level: Not on file   Occupational History    Not on file   Tobacco Use    Smoking status: Never Smoker    Smokeless tobacco: Never Used   Vaping Use    Vaping Use: Never used   Substance and Sexual Activity    Alcohol use: Yes     Alcohol/week: 11.0 standard drinks     Types: 7 Glasses of wine, 4 Cans of beer per week    Drug use: Yes     Types: Marijuana     Comment: cbd gummies    Sexual activity: Not on file   Other Topics Concern    Not on file   Social History Narrative    Not on file     Social Determinants of Health     Financial Resource Strain: Low Risk     Difficulty of Paying Living Expenses: Not hard at all   Food Insecurity: No Food Insecurity    Worried About Running Out of Food in the Last Year: Never true    Ran Out of Food in the Last Year: Never true   Transportation Needs: No Transportation Needs    Lack of Transportation (Medical): No    Lack of Transportation (Non-Medical): No   Physical Activity:     Days of Exercise per Week:     Minutes of Exercise per Session:    Stress:     Feeling of Stress :    Social Connections:     Frequency of Communication with Friends and Family:     Frequency of Social Gatherings with Friends and Family:     Attends Spiritism Services: Active Member of Clubs or Organizations:     Attends Club or Organization Meetings:     Marital Status:    Intimate Partner Violence:     Fear of Current or Ex-Partner:     Emotionally Abused:     Physically Abused:     Sexually Abused: Allergies   Allergen Reactions    Codeine Anaphylaxis, Rash and Nausea and Vomiting     UPDATE: Patient states that codeine only causes rash and her to itch. She says she can take it with benadryl. Current Outpatient Medications   Medication Sig Dispense Refill    Eliquis 2.5 mg tablet TAKE 1 TABLET BY MOUTH EVERY 12 HOURS 70 Tablet 0    alendronate (FOSAMAX) 70 mg tablet Take 1 Tablet by mouth every seven (7) days. 4 Tablet 11    acetaminophen (TYLENOL) 500 mg tablet Take 1,000 mg by mouth every six (6) hours as needed for Pain.       ALPRAZolam Niyah Spearing) 0.25 mg tablet Take 1 Tablet by mouth two (2) times daily as needed for Anxiety. Max Daily Amount: 0.5 mg. 60 Tablet 5    zolpidem (Ambien) 10 mg tablet Take 1 Tablet by mouth nightly as needed for Sleep. Max Daily Amount: 10 mg. 30 Tablet 5    venlafaxine-SR (EFFEXOR-XR) 150 mg capsule Take 1 Capsule by mouth daily. 30 Capsule 5    docosahexaenoic acid/epa (FISH OIL PO) Take 1 Tablet by mouth two (2) times a day. atorvastatin (LIPITOR) 80 mg tablet Take 1 Tablet by mouth daily. Indications: excessive fat in the blood 90 Tablet 1    fluticasone propionate (FLONASE) 50 mcg/actuation nasal spray 2 Sprays by Both Nostrils route daily. 1 Bottle 5    pantoprazole (PROTONIX) 40 mg tablet Take 1 Tablet by mouth daily. 90 Tablet 1    loratadine (CLARITIN) 10 mg tablet Take 1 Tab by mouth daily as needed for Allergies. 90 Tab 1    cholecalciferol (VITAMIN D3) 1,000 unit cap Vitamin D3 1,000 unit capsule   Take by oral route. ASCORBATE CALCIUM (VITAMIN C PO) Take 1 Tablet by mouth daily. BIOTIN PO Take 1 Tablet by mouth daily. calcium-cholecalciferol, d3, 600 mg calcium- 200 unit cap Take 2 Tabs by mouth daily. Indications: HYPOCALCEMIA PREVENTION, PREVENTION OF VITAMIN D DEFICIENCY      multivitamin (ONE A DAY) tablet Take 1 Tab by mouth daily. Review of Systems   Constitutional: Negative. HENT: inflammed nasal and throat mucosa. Eyes: Negative. Respiratory: Negative. Cardiovascular: cough ,nasal congestion   Gastrointestinal: Negative. Genitourinary: Negative. Musculoskeletal: Positive for back pain, joint pain and myalgias. Skin: Negative. Neurological: Negative. Endo/Heme/Allergies: Negative. Psychiatric/Behavioral: Positive for depression. The patient is nervous/anxious. Physical Exam   Constitutional: She is oriented to person, place, and time. She appears well-developed and well-nourished. HENT:   Head: Normocephalic and atraumatic. Right Ear: External ear normal.   Left Ear: External ear normal.   Nose: runny nose/cough  Mouth/Throat: Oropharynx iS INFLAMMED  Eyes:    Neck: Normal range of motion. Neck supple. Cardiovascular:   Pulmonary/Chest: Effort normal and breath sounds normal.   Abdominal: Soft. Bowel sounds are normal.   Musculoskeletal: . Diffuse tenderness in multiple joints-- left lateral neck pain/left elbowshoulder joints/hips/low back    ls spine  diffise tenderness  Right sciatica   Neurological: She is alert and oriented to person, place, and time. She has normal reflexes. Skin: Skin is warm and dry. Psychiatric: She has a normal mood and affect. Her behavior is normal. Judgment and thought content normal.         . RESULTRCNTNC(15W    ASSESSMENT/PLAN:    )1. Acute bronchitis, unspecified organism  Comments:  covid positive in dec 2022--test still positive  URI in 10.2022-CT chect neg  abx  prednisone  f/u in 10 days-  Orders:  -     predniSONE (DELTASONE) 20 MG tablet; Take 1 tablet by mouth daily for 7 days, Disp-7 tablet, R-0Normal  -     doxycycline hyclate (VIBRA-TABS) 100 MG tablet; Take 1 tablet by mouth 2 times daily for 10 days, Disp-20 tablet, R-0Normal  -     XR CHEST (2 VW); Future  2. Hyperlipidemia, unspecified hyperlipidemia type  Overview:  Stable on statin  Labs today      3. Gastro-esophageal reflux disease with esophagitis, without bleeding  -     omeprazole (PRILOSEC) 40 MG delayed release capsule; Take 1 capsule by mouth daily, Disp-90 capsule, R-0Normal  4. Chronic obstructive pulmonary disease, unspecified COPD type ([facility] [address]  Overview:   Secondary to second hand tobacco smoke  Asymptomatic  Refuses inhalers  Uses albuterol as needed    5. Age-related osteoporosis without current pathological fracture  Overview:  Last dexa 2015--osteopenia  ]on fosamax -tolerating well  Recheck in 5/2019-improved osteopenia  10/2021  On fosamax      Orders:  -     alendronate (FOSAMAX) 70 MG tablet;  Take 1 tablet by mouth every 7 days, Disp-4 tablet, R-5Normal  6. Sarcoidosis of lung ([facility] [address]  Overview:  S/p BIOPSY in 1993  Seen pulmonologist at [address]  Asymptomatic  Was on albuterol as needed  4.2021  [place] PULMONOLOGIST      7. Obstructive sleep apnea (adult) (pediatric)  Overview: On cpap  8. Obesity, unspecified classification, unspecified obesity type, unspecified whether serious comorbidity present  Overview:  Weight loss encouraged      9. Dietary counseling and surveillance  Overview:  Low calorie/fat diet      10. Cough, unspecified type  Overview:  mucinex dm  Orders:  -     XR CHEST (2 VW); Future  11. Chest congestion  -     XR CHEST (2 VW);  Future         Orders Placed This Encounter   Procedures    XR CHEST (2 VW)     Standing Status:   Future     Standing Expiration Date:   1/9/2024      Orders Placed This Encounter   Medications    alendronate (FOSAMAX) 70 MG tablet     Sig: Take 1 tablet by mouth every 7 days     Dispense:  4 tablet     Refill:  5    omeprazole (PRILOSEC) 40 MG delayed release capsule     Sig: Take 1 capsule by mouth daily     Dispense:  90 capsule     Refill:  0    predniSONE (DELTASONE) 20 MG tablet     Sig: Take 1 tablet by mouth daily for 7 days     Dispense:  7 tablet     Refill:  0    doxycycline hyclate (VIBRA-TABS) 100 MG tablet     Sig: Take 1 tablet by mouth 2 times daily for 10 days     Dispense:  20 tablet     Refill:  0      Results for orders placed or performed during the hospital encounter of 08/19/21   HEMOGLOBIN   Result Value Ref Range    HGB 11.9 11.7 - 15.4 g/dL   HEMOGLOBIN   Result Value Ref Range    HGB 10.9 (L) 11.7 - 15.4 g/dL   CBC W/O DIFF   Result Value Ref Range    WBC 10.8 4.3 - 11.1 K/uL    RBC 3.81 (L) 4.05 - 5.2 M/uL    HGB 10.5 (L) 11.7 - 15.4 g/dL    HCT 32.1 (L) 35.8 - 46.3 %    MCV 84.3 79.6 - 97.8 FL    MCH 27.6 26.1 - 32.9 PG    MCHC 32.7 31.4 - 35.0 g/dL    RDW 14.7 (H) 11.9 - 14.6 %    PLATELET 053 716 - 623 K/uL    MPV 10.5 9.4 - 12.3 FL ABSOLUTE NRBC 0.00 0.0 - 0.2 K/uL     Lab Results   Component Value Date     10/10/2022    K 3.8 10/10/2022     10/10/2022    CO2 29 10/10/2022    BUN 16 10/10/2022    CREATININE 0.85 10/10/2022    GLUCOSE 100 10/10/2022    CALCIUM 9.2 10/10/2022    PROT 7.7 10/10/2022    LABALBU 4.0 10/10/2022    BILITOT 1.2 (H) 10/10/2022    ALKPHOS 101 10/10/2022    AST 23 10/10/2022    ALT 28 10/10/2022    LABGLOM >60 10/10/2022    GFRAA >60 08/03/2021    AGRATIO 1.2 04/29/2021    GLOB 3.7 (H) 10/10/2022       Lab Results   Component Value Date    CHOL 115 10/04/2022    CHOL 126 04/29/2021    CHOL 218 09/03/2020     Lab Results   Component Value Date    TRIG 108 10/04/2022    TRIG 72 04/29/2021    TRIG 148 09/03/2020     Lab Results   Component Value Date    HDL 51 10/04/2022    HDL 67 (H) 04/29/2021    HDL 56 09/03/2020     Lab Results   Component Value Date    LDLCALC 42.4 10/04/2022    LDLCALC 44.6 04/29/2021    LDLCALC 132.4 (H) 09/03/2020     Lab Results   Component Value Date    LABVLDL 21.6 10/04/2022    LABVLDL 14.4 04/29/2021    LABVLDL 29.6 (H) 09/03/2020     Lab Results   Component Value Date    CHOLHDLRATIO 2.3 10/04/2022    CHOLHDLRATIO 1.9 04/29/2021    CHOLHDLRATIO 3.9 09/03/2020     Lab Results   Component Value Date    NQP2AQS 0.594 10/04/2022     Mammogram Result (most recent):  SERGEY DIGITAL SCREEN W OR WO CAD BILATERAL 10/14/2021    Narrative  This is a summary report. The complete report is available in the patient's medical record. If you cannot access the medical record, please contact the sending organization for a detailed fax or copy. BILATERAL SCREENING DIGITAL MAMMOGRAPHY:    CLINICAL HISTORY:   Remote bilateral reduction mammoplasty. TECHNIQUE:  Craniocaudal and mediolateral oblique views of both breasts were  performed and are interpreted in conjunction with a computer assisted detection  (CAD) system. COMPARISON:   Priors dating from June 4, 2015.     FINDINGS:  There is fatty replaced fibroglandular tissue in a fairly symmetric  pattern bilaterally. A few scattered typically benign bilateral calcifications  and postoperative architectural distortion are again noted. No new or enlarging  soft tissue mass, dominant cluster of suspicious microcalcifications, or other  definite evidence for malignancy is seen. No significant change is seen from  prior study. Impression  NO SPECIFIC MAMMOGRAPHIC EVIDENCE FOR MALIGNANCY. FOLLOW UP BILATERAL SCREENING  MAMMOGRAPHY IS RECOMMENDED IN ONE YEAR. BI-RADS Assessment Category 2: Benign finding. A reminder letter will be scheduled. BD1    DEXA Result (most recent):  DEXA BONE DENSITY AXIAL SKELETON 06/03/2020    Narrative  BONE DENSITOMETRY:    CLINICAL HISTORY: Postmenopausal screening with a history of adult fracture and  prior medical therapy. FINDINGS:  Without prior study for comparison, bone mineral density in the  lumbar spine at L1-4  was measured at 1.050 g per square centimeter with a T  score of -1.2. Minimal bone mineral density at the hips is measured at the  right femoral neck  at 0.815  g per square centimeter with a T score of -1.6. The FRAX index suggests a 10-year probability of a major osteoporotic fracture  at 7% and of a hip fracture at 0.7%. Impression  IMPRESSION: Osteopenia. Followup scan should be considered in 2 years, as  clinically indicated. We discussed the expected course, resolution and complications of the diagnosis(es) in detail. Medication risks, benefits, costs, interactions, and alternatives were discussed as indicated. I advised her to contact the office if her condition worsens, changes or fails to improve as anticipated. She expressed understanding with the diagnosis(es) and plan. Due to this being a TeleHealth evaluation, many elements of the physical examination are unable to be assessed.     We discussed the expected course, resolution and complications of the diagnosis(es) in detail. Medication risks, benefits, costs, interactions, and alternatives were discussed as indicated. I advised her to contact the office if her condition worsens, changes or fails to improve as anticipated. She expressed understanding with the diagnosis(es) and plan. Pursuant to the emergency declaration under the [facility] waiver authority and the [employer] Act, this Virtual  Visit was conducted, with patient's consent, to reduce the patient's risk of exposure to COVID-19 and provide continuity of care for an established patient. Services were provided through a video synchronous discussion virtually to substitute for in-person clinic visit. Follow-up and Dispositions    Return in about 2 weeks     Go to ER if worse    Jaky Willis MD

## 2023-01-09 NOTE — CARE COORDINATION
Ambulatory Care Coordination Note  1/9/2023    ACC: Gertrude Hodges RN    Ccm outreached to patient. Patient state she is doing well. Patient had pcp appointment today. Patient states she was started on abt and prednisone. Patient states she is feeling better. Patent states she is eating and drinking well. Patient states no questions or concerns. Ccm discussed that I would outreach next week. Patient agreeable. Offered patient enrollment in the Remote Patient Monitoring (RPM) program for in-home monitoring: NA]. Lab Results       None            Care Coordination Interventions    Referral from Primary Care Provider: No  Suggested Interventions and Community Resources          Goals Addressed                   This Visit's Progress     Conditions and Symptoms   On track     I will schedule office visits, as directed by my provider. I will keep my appointment or reschedule if I have to cancel. I will notify my provider of any barriers to my plan of care. I will notify my provider of any symptoms that indicate a worsening of my condition. Barriers: none  Plan for overcoming my barriers: N/A  Confidence: 9/10  Anticipated Goal Completion Date: 12/11/22                Prior to Admission medications    Medication Sig Start Date End Date Taking?  Authorizing Provider   alendronate (FOSAMAX) 70 MG tablet Take 1 tablet by mouth every 7 days 1/9/23   Nisha Lowery MD   omeprazole (PRILOSEC) 40 MG delayed release capsule Take 1 capsule by mouth daily 1/9/23   Nisha Lowery MD   predniSONE (DELTASONE) 20 MG tablet Take 1 tablet by mouth daily for 7 days 1/9/23 1/16/23  Nisha Lowery MD   doxycycline hyclate (VIBRA-TABS) 100 MG tablet Take 1 tablet by mouth 2 times daily for 10 days 1/9/23 1/19/23  Nisha Lowery MD   Multiple Vitamins-Minerals (THERAPEUTIC MULTIVITAMIN-MINERALS) tablet Take 1 tablet by mouth daily    Historical Provider, MD   Dextromethorphan-guaiFENesin [facility] DM PO) Take by mouth as needed    Historical Provider, MD   acetaminophen (TYLENOL) 500 MG tablet Take 500 mg by mouth every 6 hours as needed for Pain    Historical Provider, MD   vitamin D 25 MCG (1000 UT) CAPS Take 1 capsule by mouth daily    Historical Provider, MD   simvastatin (ZOCOR) 40 MG tablet Take 40 mg by mouth nightly    Historical Provider, MD   venlafaxine (EFFEXOR XR) 150 MG extended release capsule Take 1 capsule by mouth daily 9/21/22   Eleuterio Chino MD       Future Appointments   Date Time Provider Frannie Montiel   1/12/2023 12:00 PM Mary Carmen Dorsey MD [id number]   1/13/2023  4:00 PM Eleuterio Chino MD [id number] [facility] AMB

## 2023-01-10 RX ORDER — VENLAFAXINE HYDROCHLORIDE 150 MG/1
CAPSULE, EXTENDED RELEASE ORAL
Qty: 30 CAPSULE | Refills: 2 | OUTPATIENT
Start: 2023-01-10

## 2023-01-13 ENCOUNTER — TELEMEDICINE (OUTPATIENT)
Dept: BEHAVIORAL/MENTAL HEALTH CLINIC | Age: 68
End: 2023-01-13
Payer: MEDICARE

## 2023-01-13 DIAGNOSIS — F33.42 RECURRENT MAJOR DEPRESSIVE DISORDER, IN FULL REMISSION (HCC): Primary | ICD-10-CM

## 2023-01-13 DIAGNOSIS — F51.01 PRIMARY INSOMNIA: ICD-10-CM

## 2023-01-13 DIAGNOSIS — F41.1 GENERALIZED ANXIETY DISORDER: ICD-10-CM

## 2023-01-13 PROCEDURE — 99214 OFFICE O/P EST MOD 30 MIN: CPT | Performed by: PSYCHIATRY & NEUROLOGY

## 2023-01-13 PROCEDURE — G8399 PT W/DXA RESULTS DOCUMENT: HCPCS | Performed by: PSYCHIATRY & NEUROLOGY

## 2023-01-13 PROCEDURE — G8427 DOCREV CUR MEDS BY ELIG CLIN: HCPCS | Performed by: PSYCHIATRY & NEUROLOGY

## 2023-01-13 PROCEDURE — 1090F PRES/ABSN URINE INCON ASSESS: CPT | Performed by: PSYCHIATRY & NEUROLOGY

## 2023-01-13 PROCEDURE — 1123F ACP DISCUSS/DSCN MKR DOCD: CPT | Performed by: PSYCHIATRY & NEUROLOGY

## 2023-01-13 PROCEDURE — 3017F COLORECTAL CA SCREEN DOC REV: CPT | Performed by: PSYCHIATRY & NEUROLOGY

## 2023-01-13 RX ORDER — VENLAFAXINE HYDROCHLORIDE 150 MG/1
150 CAPSULE, EXTENDED RELEASE ORAL DAILY
Qty: 30 CAPSULE | Refills: 3 | Status: SHIPPED | OUTPATIENT
Start: 2023-01-13

## 2023-01-13 RX ORDER — ZOLPIDEM TARTRATE 10 MG/1
10 TABLET ORAL NIGHTLY PRN
Qty: 30 TABLET | Refills: 2 | Status: SHIPPED | OUTPATIENT
Start: 2023-01-13 | End: 2023-04-13

## 2023-01-13 RX ORDER — ALPRAZOLAM 0.25 MG/1
0.25 TABLET ORAL 2 TIMES DAILY PRN
Qty: 60 TABLET | Refills: 3 | Status: SHIPPED | OUTPATIENT
Start: 2023-01-13 | End: 2023-05-13

## 2023-01-13 ASSESSMENT — PATIENT HEALTH QUESTIONNAIRE - PHQ9
SUM OF ALL RESPONSES TO PHQ QUESTIONS 1-9: 2
3. TROUBLE FALLING OR STAYING ASLEEP: 0
7. TROUBLE CONCENTRATING ON THINGS, SUCH AS READING THE NEWSPAPER OR WATCHING TELEVISION: 0
4. FEELING TIRED OR HAVING LITTLE ENERGY: 1
SUM OF ALL RESPONSES TO PHQ QUESTIONS 1-9: 2
SUM OF ALL RESPONSES TO PHQ9 QUESTIONS 1 & 2: 0
SUM OF ALL RESPONSES TO PHQ QUESTIONS 1-9: 2
9. THOUGHTS THAT YOU WOULD BE BETTER OFF DEAD, OR OF HURTING YOURSELF: 0
2. FEELING DOWN, DEPRESSED OR HOPELESS: 0
6. FEELING BAD ABOUT YOURSELF - OR THAT YOU ARE A FAILURE OR HAVE LET YOURSELF OR YOUR FAMILY DOWN: 0
SUM OF ALL RESPONSES TO PHQ QUESTIONS 1-9: 2
1. LITTLE INTEREST OR PLEASURE IN DOING THINGS: 0
5. POOR APPETITE OR OVEREATING: 1
8. MOVING OR SPEAKING SO SLOWLY THAT OTHER PEOPLE COULD HAVE NOTICED. OR THE OPPOSITE, BEING SO FIGETY OR RESTLESS THAT YOU HAVE BEEN MOVING AROUND A LOT MORE THAN USUAL: 0
10. IF YOU CHECKED OFF ANY PROBLEMS, HOW DIFFICULT HAVE THESE PROBLEMS MADE IT FOR YOU TO DO YOUR WORK, TAKE CARE OF THINGS AT HOME, OR GET ALONG WITH OTHER PEOPLE: 0

## 2023-01-13 ASSESSMENT — ANXIETY QUESTIONNAIRES
5. BEING SO RESTLESS THAT IT IS HARD TO SIT STILL: 0
4. TROUBLE RELAXING: 0
2. NOT BEING ABLE TO STOP OR CONTROL WORRYING: 0
GAD7 TOTAL SCORE: 3
IF YOU CHECKED OFF ANY PROBLEMS ON THIS QUESTIONNAIRE, HOW DIFFICULT HAVE THESE PROBLEMS MADE IT FOR YOU TO DO YOUR WORK, TAKE CARE OF THINGS AT HOME, OR GET ALONG WITH OTHER PEOPLE: NOT DIFFICULT AT ALL
3. WORRYING TOO MUCH ABOUT DIFFERENT THINGS: 1
7. FEELING AFRAID AS IF SOMETHING AWFUL MIGHT HAPPEN: 0
1. FEELING NERVOUS, ANXIOUS, OR ON EDGE: 1
6. BECOMING EASILY ANNOYED OR IRRITABLE: 1

## 2023-01-13 NOTE — PROGRESS NOTES
Patient:  Sallie Galvan  Age:  75 y.o.  :  1955     SEX:  female MRN:  [id number]     RACE: Black /      SEEN:  [x]  PATIENT  []  SPOUSE []  OTHER:              PHQ-9  2023   Little interest or pleasure in doing things 0 0 0   Little interest or pleasure in doing things - - -   Feeling down, depressed, or hopeless 0 1 0   Trouble falling or staying asleep, or sleeping too much 0 - 0   Trouble falling or staying asleep, or sleeping too much - - -   Feeling tired or having little energy 1 - 3   Feeling tired or having little energy - - -   Poor appetite or overeating 1 - 1   Poor appetite, weight loss, or overeating - - -   Feeling bad about yourself - or that you are a failure or have let yourself or your family down 0 - 0   Feeling bad about yourself - or that you are a failure or have let yourself or your family down - - -   Trouble concentrating on things, such as reading the newspaper or watching television 0 - 0   Trouble concentrating on things such as school, work, reading, or watching TV - - -   Moving or speaking so slowly that other people could have noticed. Or the opposite - being so fidgety or restless that you have been moving around a lot more than usual 0 - 0   Moving or speaking so slowly that other people could have noticed; or the opposite being so fidgety that others notice - - -   Thoughts that you would be better off dead, or of hurting yourself in some way 0 - 0   Thoughts of being better off dead, or hurting yourself in some way - - -   PHQ-2 Score 0 1 0   Total Score PHQ 2 - - -   PHQ-9 Total Score 2 1 4   PHQ 9 Score - - -   If you checked off any problems, how difficult have these problems made it for you to do your work, take care of things at home, or get along with other people?  0 - 0   How difficult have these problems made it for you to do your work, take care of your home and get along with others - - -       NIALL-7 SCREENING 1/13/2023 9/21/2022 6/20/2022   Feeling nervous, anxious, or on edge Several days Not at all Several days   Not being able to stop or control worrying Not at all Nearly every day Nearly every day   Worrying too much about different things Several days Several days Several days   Trouble relaxing Not at all Not at all Not at all   Being so restless that it is hard to sit still Not at all Not at all Several days   Becoming easily annoyed or irritable Several days Several days Nearly every day   Feeling afraid as if something awful might happen Not at all Several days Several days   NIALL-7 Total Score 3 6 10   How difficult have these problems made it for you to do your work, take care of things at home, or get along with other people? Not difficult at all Not difficult at all Not difficult at all   Feeling nervous, anxious, or on edge - - -   NIALL-7 Total Score - - -        I was at home while conducting this encounter. Consent:  She and/or her healthcare decision maker is aware that this patient-initiated Telehealth encounter is a billable service, with coverage as determined by her insurance carrier. She is aware that she may receive a bill and has provided verbal consent to proceed: YesPatient identification was verified, and a caregiver was present when appropriate. The patient was located in a state where the provider was credentialed to provide care. This virtual visit was conducted via [address]. Pursuant to the emergency declaration under the [facility], [facility] waiver authority and the [employer] Act, this Virtual  Visit was conducted to reduce the patient's risk of exposure to COVID-19 and provide continuity of care for an established patient. Services were provided through a video synchronous discussion virtually to substitute for in-person clinic visit.   Due to this being a TeleHealth evaluation, many elements of the physical examination are unable to be assessed. Total Time: minutes: 11-20 minutes. Chief complaint:  Pt says she had tested positive for COVID last week and is been started on steroids and antibiotics. Subjective:  Seen virtually for follow-up. Had to call her on the phone because of poor sound.  has been started on steroids and antibiotics for COVID. Tested positive last week. She had a good holiday season and got to see all her children and grandbabies. One of her grandbabies just turned 1 yesterday and the parties tomorrow. She will not be able to attend. She hates to miss the party. Her children have been clearing and they bring food to her door. Supportive psychotherapy provided. Patient denies suicidal ideation/homicidal ideations. Denies symptoms psychosis.  has been taking her depression medicine regularly.     Patient Active Problem List   Diagnosis    Hot flashes    LEE (obstructive sleep apnea)    Sarcoidosis of lung ([facility] [address]    History of pulmonary embolism    Medicare annual wellness visit, subsequent    Celiac disease    Recurrent major depressive disorder ([facility] [address]    Closed fracture of greater tuberosity of right humerus    Liver cyst    Tear of right rotator cuff    Cervicalgia    Left shoulder pain    Impaired fasting glucose    RLS (restless legs syndrome)    Allergic rhinitis    Lumbago    Tarsal tunnel syndrome    Acute seasonal allergic rhinitis due to pollen    IBS (irritable bowel syndrome)    History of pulmonary embolus (PE)    Preoperative clearance    Nonallopathic lesion of sacral region    Osteoarthritis    RA (rheumatoid arthritis) (HCC)    Malaise and fatigue    ANTHONY (iron deficiency anemia)    Environmental allergies    Acute right-sided low back pain with sciatica    Sinusitis    Noncompliance with CPAP treatment    Primary insomnia    Radiculopathy affecting upper extremity    Dysthymic disorder    Mixed hyperlipidemia    Other speech disturbances    ACP (advance care planning)    Generalized anxiety disorder    Lesion of liver    Asthma    Arthritis of right knee    Osteoarthritis of right acromioclavicular joint    Gastroesophageal reflux disease    History of DVT (deep vein thrombosis)    Left elbow pain    Contusion of left shoulder    Fall    Hypertension    Chronic obstructive pulmonary disease (HCC)    Dietary counseling and surveillance    Pulmonary hypertension, mild (HCC)    Fibromyalgia    Arthritis, hip    Opioid use, unspecified with unspecified opioid-induced disorder ([facility] [address]    BMI 30.0-30.9,adult    Obesity    Hyperlipidemia    S/P gastric bypass    Osteoporosis    Status post total knee replacement, right    Pharyngitis    Screening mammogram for breast cancer    Gastro-esophageal reflux disease with esophagitis, without bleeding    Obstructive sleep apnea (adult) (pediatric)    Cough    Acute bronchitis     Denies palpitation,SOB, Chest pain, headaches. In no acute distress. MEDICATION REVIEW:    Current Medications:    Current Outpatient Medications   Medication Sig    venlafaxine (EFFEXOR XR) 150 MG extended release capsule Take 1 capsule by mouth daily    zolpidem (AMBIEN) 10 MG tablet Take 1 tablet by mouth nightly as needed for Sleep for up to 90 days. ALPRAZolam (XANAX) 0.25 MG tablet Take 1 tablet by mouth 2 times daily as needed for Sleep for up to 120 days.  Max Daily Amount: 0.5 mg    alendronate (FOSAMAX) 70 MG tablet Take 1 tablet by mouth every 7 days    omeprazole (PRILOSEC) 40 MG delayed release capsule Take 1 capsule by mouth daily    predniSONE (DELTASONE) 20 MG tablet Take 1 tablet by mouth daily for 7 days    doxycycline hyclate (VIBRA-TABS) 100 MG tablet Take 1 tablet by mouth 2 times daily for 10 days    Multiple Vitamins-Minerals (THERAPEUTIC MULTIVITAMIN-MINERALS) tablet Take 1 tablet by mouth daily    Dextromethorphan-guaiFENesin (MUCINEX DM PO) Take by mouth as needed    acetaminophen (TYLENOL) 500 MG tablet Take 500 mg by mouth every 6 hours as needed for Pain    vitamin D 25 MCG (1000 UT) CAPS Take 1 capsule by mouth daily    simvastatin (ZOCOR) 40 MG tablet Take 40 mg by mouth nightly     No current facility-administered medications for this visit. Allergies   Allergen Reactions    Codeine Hives       Past Medical History, Past Surgical History, Family history, Social History, and Medications were all reviewed with the patient today and updated as necessary.      Compliant with medication: Yes and No   Side effects from medications:  No     EXAMINATION  Musculoskeletal    GAIT AND STATION   [x] WNL   [] RESTRICTED   [] UNSTEADY WALK        [] ABNORMAL   [] UNBALANCED         PSYCHIATRIC     GENERAL APPEARANCE:   []  WELL GROOMED []     DISHEVELED   []  UNKEMPT      []  UNUSUAL/BIZZARE    [x] WNL       ATTITUDE:   [x] COOPERATIVE   [] GUARDED   [] SUSPICIOUS      [] HOSTILE                            BEHAVIOR:   [x] CALM   [] HYPERACTIVE   [] MANNERISMS      [] BIZZARE         SPEECH:   [x] NORMAL FOR CLIENT   [] SPONTANEOUS   [] SLURRED   [] WHISPERING      [] LOUD   [] PRESSURED   [] ARTICULATE       EYE CONTACT:   [x] WNL   [] BLANK STARE   [] INTENSE      [] AVOIDANT         MOOD:   [x] EUTHYMIC   [] ANXIOUS   [] DEPRESSED      [] IRRITABLE   [] ANGRY   [] APATHETIC     AFFECT:   [x] CONGRUENT WITH MOOD   [] FLAT   [] CONSTRICTED      [] INAPPROPRIATE   [] LABILE           THOUGHT PROCESS:   [x] LOGICAL/GOAL-DIRECTED   [] FOI   [] CIRCUMSANTIAL      [] INCOHERENT   [] TANGENTIAL   [] CONCRETE      [] PERSEVERATION           THOUGHT CONTENT:                DELUSIONS  [x] DENIES  [] GRANDIOSE  [] PERSECUTORY  [] Pentecostal  [] REFERENCE   HALLUCINATIONS  [x] DENIES  [] AUDITORY  [] VISUAL  [] OLFACTORY  [] TACTILE     [] GUSTATORY  [] SOMATIC         OBSESSIONS  [x] DENIES  [] PRESENT         SUICIDAL IDEATION  [x] DENIES  [] PRESENT W/O PLAN  [] PRESENT W/ PLAN       HOMICIDAL IDEATION  [x] DENIES  [] PRESENT W/O PLAN  [] PRESENT W/ PLAN           JUDGEMENT:   [x] GOOD   [] FAIR   [] POOR   INSIGHT:   [x] GOOD   [] FAIR   [] POOR     COGNITION:           SENSORIUM:   [x] ALERT   [] CLOUDED   [] DROWSY     ORIENTATION:   [x] INTACT   [] TIME:  PLACE  PERSON   RECENT & REMOTE MEMORY:   [] NORMAL   [x] OTHER:                  ATTENTION:   [x] INTACT   [] MILD IMPAIRMENT   [] SEVERE IMPAIRMENT     CONCENTRATION:   [x] INTACT   [] MILD IMPAIRMENT   [] SEVERE IMPAIRMENT     LANGUAGE:   [x] AVERAGE   [] ABOVE AVERAGE   [] BELOW AVERAGE     FUND OF KNOWLEDGE:   [] UNABLE TO ASSESS AT THIS TIME   [x] AVERAGE   [] ABOVE AVERAGE   [] BELOW AVERAGE      [] GOOD TO EXCELLENT KNOWLEDGE OF CURRENT EVENTS   [] POOR TO NO KNLEDGE OF CURRENT EVENTS           ABNORMAL MOVEMENTS:   [x] NONE   [] TICS   [] TREMORS   [] BIZZARE      [] FACE   [] TRUNK   [] EXTREMETIES   [] GESTURES        SLEEP:   [x] GOOD   [] FAIR   [] POOR      MUSCLE STRENGTH AND TONE   [] WNL   [] ATROPHY   [] SPASTIC        [] FLACCID   [] COGWHEEL         Diagnoses/Impressions:    ICD-10-CM    1. Recurrent major depressive disorder, in full remission ([facility] [address]  F33.42       2. Primary insomnia  F51.01 zolpidem (AMBIEN) 10 MG tablet      3.  Generalized anxiety disorder  F41.1 ALPRAZolam (XANAX) 0.25 MG tablet          TREATMENT GOALS:  Symptom reduction, Medication adherence, maintain therapeutic gains    LABS/IMAGING:    []  Ordered [x]  Reviewed []  New Labs Ordered:     LAB  WBC   Date/Time Value Ref Range Status   10/10/2022 11:57 AM 9.5 4.3 - 11.1 K/uL Final     Hemoglobin   Date/Time Value Ref Range Status   10/10/2022 11:57 AM 13.7 11.7 - 15.4 g/dL Final     Hematocrit   Date/Time Value Ref Range Status   10/10/2022 11:57 AM 43.1 35.8 - 46.3 % Final     Platelets   Date/Time Value Ref Range Status   10/10/2022 11:57  150 - 450 K/uL Final     Sodium   Date/Time Value Ref Range Status   10/10/2022 11:57  136 - 145 mmol/L Final     Potassium Date/Time Value Ref Range Status   10/10/2022 11:57 AM 3.8 3.5 - 5.1 mmol/L Final     Chloride   Date/Time Value Ref Range Status   10/10/2022 11:57  101 - 110 mmol/L Final     CO2   Date/Time Value Ref Range Status   10/10/2022 11:57 AM 29 21 - 32 mmol/L Final     BUN   Date/Time Value Ref Range Status   10/10/2022 11:57 AM 16 8 - 23 MG/DL Final     ALT   Date/Time Value Ref Range Status   10/10/2022 11:57 AM 28 12 - 65 U/L Final     AST   Date/Time Value Ref Range Status   10/10/2022 11:57 AM 23 15 - 37 U/L Final       Please refer to the lab tab in the epic and care everywhere for the most recent lab results. Plan:     [x]  Medication ordered: Effexor, Ambien, Xanax to target depression, anxiety, insomnia. [x]  Medication education/counseling provided  Medication dosage and time to take, purpose/expected benefits/risks, common side effects, lab monitoring required and reason, expected length of treatment, risk of no treatment, effects on pregnancy/nursing, financial availability. Educated patient on  side effects/risks/benefits of meds including  cardiac arrhythmias, suicidal ideations, orthostatic hypotension, serotonin syndrome, risk of susy/hypomania from antidepressants, withdrawals from abrupt discontinuation of meds, risk of bleeding, risk of seizures, addiction potential, memory impairment with long term use of benzos, respiratory depression, complex sleep behaviors on Ambien including sleepwalking, driving while asleep, making phone calls while asleep, preparing and eating food while asleep, high blood pressure, dizziness, drowsiness, sedation , risk of falls, Risk & benefits discussed: including but not limited to possible off-label medication usage.        [x] Follow MSE for sxs improvement     I have reviewed the patients controlled substance prescription history, as maintained in the [place] prescription monitoring program, so that the prescription(s) for a  controlled substance can be given. Recommendations and Referrals: Follow up with : MD, requires monitoring of response to medication, requires monitoring of medication side effects. Time until next PMA:     Follow up with Mental Health Clinicians: psychotherapy interventions, improve level of functioning, monitoring to prevent decompensation /hospitalization, monitoring to maintain therapeutic gains, symptoms (resolving and controlled)           Psychotherapy note:                                __10_ Minutes of psychotherapy     [x]  Supportive psychotherapy, Patient discussed certain situational and personal stressors ongoing in her life at this time, weight management d/w the patient. Sleep hygiene d/w patient. Patient allowed to vent out her emotions. Scenarios were reviewed using role playing and CBT techniques in order to increase insight and decrease anxiety. []  Disposition planning      []  Dangerous and will not contract for safety in the community    **Pateint has been notified: They are to call 911 or go to their nearest E.R. if they are experiencing a medical emergency or suicidal ideations/homicidal ideations. **  All ancillary documentation entered reviewed by provider. PLEASE NOTE:  This document has been produced in part or whole using voice recognition software. Proofread however unrecognized errors in transcription may be present.         Lionel Fernandez MD

## 2023-02-08 PROBLEM — R05.9 COUGH: Status: RESOLVED | Noted: 2023-01-09 | Resolved: 2023-02-08

## 2023-02-13 PROBLEM — I73.9 RIGHT LEG CLAUDICATION (HCC): Status: ACTIVE | Noted: 2023-02-13

## 2023-04-05 NOTE — PROGRESS NOTES
Workers' Compensation INITIAL Office Visit  Employer: [employer]   Date of Injury: 4/3/2023   Subjective    HISTORY OF PRESENT ILLNESS:   Christian Ortiz is a 31 year old male, who presents with a complaint of an injury that reportedly occurred during work activities.  The injury occurred as described below.  He presented to the urgent care and xrays were negative.  He was given a brace and told to follow up.    The ankle is feeling a little better.  There is a \"tightness\" in the ankle and lower leg.  He is not able to tolerate long periods of standing and weight bearing.  No bruising.    MECHANISM OF INJURY:  He states that while he was at work on 4/3/2023, he stepped wrong and inverted the ankle.    OCCUPATIONAL HISTORY:  He works [facility] [employer]   Job Title: Maintenance tech  Working for approximately:  1 years.   Job is full time, Normal work hours: 40 hours/week  He denies any other precipitating event or activity.  He has no history of previous problems.    CURRENT MEDICATIONS: Current Medications are reviewed.   Medications relevant to this injury include:   Tylenol or ibuprofen     PAST HISTORY: Past medical history and past surgical history are reviewed in the record and findings relevant to the injury are included in the History of Present Illness.  Objective    Vitals:    04/05/23 0854   BP: 116/72   Pulse: 60     Physical Exam  Musculoskeletal:      Comments: Left ankle - no gross deformities or ecchymosis.  There is tenderness over the lateral ligaments.  Some tenderness along the peroneal tendons posterior to the malleolus.  Good range of motion.  Normal dp pulse.  No pain along proximal fibula or knee.           DIAGNOSTICS:  Xray from urgent care - negative for fracture  Assessment    DIAGNOSIS:  (S93.492A) Sprain of anterior talofibular ligament of left ankle, initial encounter  (primary encounter diagnosis)  Plan:  Christian injured his ankle at work.  We discussed the diagnosis  Patient:  Hector Rizzo  Age:  75 y.o.  :  1955     SEX:  female MRN:  [id number]     RACE: Black /      SEEN:  [x]  PATIENT  []  SPOUSE []  OTHER:              PHQ-9  2022 2022 3/16/2022   Little interest or pleasure in doing things 0 0 1   Little interest or pleasure in doing things - - 1   Feeling down, depressed, or hopeless 0 1 1   Trouble falling or staying asleep, or sleeping too much 0 0 0   Trouble falling or staying asleep, or sleeping too much - - 0   Feeling tired or having little energy 3 1 3   Feeling tired or having little energy - - 3   Poor appetite or overeating 1 1 0   Poor appetite, weight loss, or overeating - - 0   Feeling bad about yourself - or that you are a failure or have let yourself or your family down 0 0 0   Feeling bad about yourself - or that you are a failure or have let yourself or your family down - - 0   Trouble concentrating on things, such as reading the newspaper or watching television 0 0 0   Trouble concentrating on things such as school, work, reading, or watching TV - - 0   Moving or speaking so slowly that other people could have noticed.  Or the opposite - being so fidgety or restless that you have been moving around a lot more than usual 0 1 0   Moving or speaking so slowly that other people could have noticed; or the opposite being so fidgety that others notice - - 0   Thoughts that you would be better off dead, or of hurting yourself in some way 0 0 -   Thoughts of being better off dead, or hurting yourself in some way - - 0   PHQ-2 Score 0 1 2   Total Score PHQ 2 - - 2   PHQ-9 Total Score 4 4 5   PHQ 9 Score - - 5   If you checked off any problems, how difficult have these problems made it for you to do your work, take care of things at home, or get along with other people? 0 0 -   How difficult have these problems made it for you to do your work, take care of your home and get along with others - - Not difficult at all       NIALL-7 SCREENING 9/21/2022 6/20/2022 3/16/2022   Feeling nervous, anxious, or on edge Not at all Several days -   Not being able to stop or control worrying Nearly every day Nearly every day -   Worrying too much about different things Several days Several days -   Trouble relaxing Not at all Not at all -   Being so restless that it is hard to sit still Not at all Several days -   Becoming easily annoyed or irritable Several days Nearly every day -   Feeling afraid as if something awful might happen Several days Several days -   NIALL-7 Total Score 6 10 -   How difficult have these problems made it for you to do your work, take care of things at home, or get along with other people? Not difficult at all Not difficult at all Not difficult at all   Feeling nervous, anxious, or on edge - - Not at all   NIALL-7 Total Score - - 4        I was at home while conducting this encounter. Consent:  She and/or her healthcare decision maker is aware that this patient-initiated Telehealth encounter is a billable service, with coverage as determined by her insurance carrier. She is aware that she may receive a bill and has provided verbal consent to proceed: YesPatient identification was verified, and a caregiver was present when appropriate. The patient was located in a state where the provider was credentialed to provide care. This virtual visit was conducted via airpim. Pursuant to the emergency declaration under the [facility], [facility] waiver authority and the Posterous and ACT[employer] General Act, this Virtual  Visit was conducted to reduce the patient's risk of exposure to COVID-19 and provide continuity of care for an established patient. Services were provided through a video synchronous discussion virtually to substitute for in-person clinic visit.   Due to this being a TeleHealth evaluation, many elements of the physical examination are unable and treatment options.  He should wear the brace for support.  Ice and over the counter meds for pain relief.  Gentle range of motion as tolerated.  Work restrictions are given. Follow up in 2 weeks for a recheck.       STATUS: This injury is determined to be WORK RELATED.    RETURN TO WORK:Employee may return to work with restrictions.   Return Date: 4/5/2023            RESTRICTIONS: Restrictions are to be followed at work and at home.  Restrictions are in effect until next follow-up visit.  Allow Christian Ortiz to sit and elevate the foot 3-4 times a shift to reduce swelling.     TREATMENT PLAN:   Medications for this injury/condition: Tylenol or ibuprofen as needed   Referral/Consult: None  Diagnostic Testing: None   Drug test completed.  Breath alcohol test completed.      Instructions: Ice affected area to reduce swelling.  Wear the brace for support.  Gentle movements to engage active healing daily.      Anticipated Maximum Medical Improvement: 4-6 weeks    FOLLOW-UP: Return in about 2 weeks (around 4/19/2023).  He is to follow-up sooner if his symptoms should get worse.     See Return to Work Report for further information.       to be assessed. Total Time: minutes: 11-20 minutes. Chief complaint:  Pt says she has been sick with upper respiratory infection and cold. Subjective:  Seen virtually for follow-up. Patient coughing during the visit. States was in the hospital yesterday for upper respiratory infection. She has been taking Mucinex DM 12 hours to loosen up the mucus. And rubbing Vicks VapoRub. Cannot keep fluids down has been having fever 98-99. States she believes she get got it from her great grandbaby who was visiting her. States her kids took her out to eat for her birthday. Supportive psychotherapy provided. Patient denies suicidal ideation/homicidal ideations. Denies symptoms psychosis. Patient asking for something for cough. We will have her PCP Dr. Charlotte Villareal office call her.     Patient Active Problem List   Diagnosis    Hot flashes    LEE (obstructive sleep apnea)    Pulmonary sarcoidosis ([facility]    History of pulmonary embolism    Medicare annual wellness visit, subsequent    Celiac disease    Recurrent major depressive disorder ([facility]    Closed fracture of greater tuberosity of right humerus    Liver cyst    Tear of right rotator cuff    Cervicalgia    Left shoulder pain    Impaired fasting glucose    RLS (restless legs syndrome)    Allergic rhinitis    Lumbago    Tarsal tunnel syndrome    Acute seasonal allergic rhinitis due to pollen    IBS (irritable bowel syndrome)    History of pulmonary embolus (PE)    Preoperative clearance    Nonallopathic lesion of sacral region    Osteoarthritis    RA (rheumatoid arthritis) (HCC)    Malaise and fatigue    ANTHONY (iron deficiency anemia)    Environmental allergies    Acute right-sided low back pain with sciatica    Sinusitis    Noncompliance with CPAP treatment    Primary insomnia    Radiculopathy affecting upper extremity    Dysthymic disorder    Mixed hyperlipidemia    Other speech disturbances    ACP (advance care planning)    Generalized anxiety disorder Lesion of liver    Asthma    Arthritis of right knee    Osteoarthritis of right acromioclavicular joint    Gastroesophageal reflux disease    History of DVT (deep vein thrombosis)    Left elbow pain    Contusion of left shoulder    Fall    Hypertension    Chronic obstructive pulmonary disease (HCC)    Dietary counseling    Pulmonary hypertension, mild (HCC)    Fibromyalgia    Arthritis, hip    Opioid use, unspecified with unspecified opioid-induced disorder (HCC)    BMI 30.0-30.9,adult    Obesity, Class I, BMI 30.0-34.9 (see actual BMI)    Hyperlipidemia    S/P gastric bypass    Osteoporosis    Status post total knee replacement, right       Denies palpitation,SOB, Chest pain, headaches. In no acute distress. MEDICATION REVIEW:    Current Medications:    Current Outpatient Medications   Medication Sig    atorvastatin (LIPITOR) 80 MG tablet     vitamin D 25 MCG (1000 UT) CAPS Vitamin D3 25 mcg (1,000 unit) capsule   Take by oral route. omeprazole (PRILOSEC) 40 MG delayed release capsule     simvastatin (ZOCOR) 40 MG tablet Zocor 40 mg tablet   Take 1 tablet every day by oral route. venlafaxine (EFFEXOR XR) 150 MG extended release capsule Take 1 capsule by mouth daily    zolpidem (AMBIEN) 10 MG tablet Take 1 tablet by mouth nightly as needed for Sleep for up to 90 days. ALPRAZolam (XANAX) 0.25 MG tablet Take 1 tablet by mouth 2 times daily as needed for Sleep for up to 90 days. alendronate (FOSAMAX) 70 MG tablet     meloxicam (MOBIC) 7.5 MG tablet Take 1 tablet by mouth in the morning. (Patient not taking: Reported on 9/21/2022)     No current facility-administered medications for this visit. Allergies   Allergen Reactions    Codeine Hives       Past Medical History, Past Surgical History, Family history, Social History, and Medications were all reviewed with the patient today and updated as necessary.      Compliant with medication: Yes   Side effects from medications: No     EXAMINATION  Musculoskeletal    GAIT AND STATION   [x] WNL   [] RESTRICTED   [] UNSTEADY WALK        [] ABNORMAL   [] UNBALANCED         PSYCHIATRIC     GENERAL APPEARANCE:   [x]  WELL GROOMED []     DISHEVELED   []  UNKEMPT      []  UNUSUAL/BIZZARE    [] WNL       ATTITUDE:   [x] COOPERATIVE   [] GUARDED   [] SUSPICIOUS      [] HOSTILE                            BEHAVIOR:   [x] CALM   [] HYPERACTIVE   [] MANNERISMS      [] BIZZARE         SPEECH:   [x] NORMAL FOR CLIENT   [] SPONTANEOUS   [] SLURRED   [] WHISPERING      [] LOUD   [] PRESSURED   [] ARTICULATE       EYE CONTACT:   [x] WNL   [] BLANK STARE   [] INTENSE      [] AVOIDANT         MOOD:   [] EUTHYMIC   [x] ANXIOUS   [] DEPRESSED      [] IRRITABLE   [] ANGRY   [] APATHETIC     AFFECT:   [x] CONGRUENT WITH MOOD   [] FLAT   [] CONSTRICTED      [] INAPPROPRIATE   [] LABILE           THOUGHT PROCESS:   [x] LOGICAL/GOAL-DIRECTED   [] FOI   [] CIRCUMSANTIAL      [] INCOHERENT   [] TANGENTIAL   [] CONCRETE      [] PERSEVERATION           THOUGHT CONTENT:                DELUSIONS  [x] DENIES  [] GRANDIOSE  [] PERSECUTORY  [] Adventism  [] REFERENCE   HALLUCINATIONS  [x] DENIES  [] AUDITORY  [] VISUAL  [] OLFACTORY  [] TACTILE     [] GUSTATORY  [] SOMATIC         OBSESSIONS  [x] DENIES  [] PRESENT         SUICIDAL IDEATION  [x] DENIES  [] PRESENT W/O PLAN  [] PRESENT W/ PLAN       HOMICIDAL IDEATION  [x] DENIES  [] PRESENT W/O PLAN  [] PRESENT W/ PLAN           JUDGEMENT:   [x] GOOD   [] FAIR   [] POOR   INSIGHT:   [x] GOOD   [] FAIR   [] POOR     COGNITION:           SENSORIUM:   [x] ALERT   [] CLOUDED   [] DROWSY     ORIENTATION:   [x] INTACT   [] TIME:  PLACE  PERSON   RECENT & REMOTE MEMORY:   [] NORMAL   [x] OTHER:                  ATTENTION:   [] INTACT   [x] MILD IMPAIRMENT   [] SEVERE IMPAIRMENT     CONCENTRATION:   [] INTACT   [x] MILD IMPAIRMENT   [] SEVERE IMPAIRMENT     LANGUAGE:   [x] AVERAGE   [] ABOVE AVERAGE   [] BELOW AVERAGE     FUND OF KNOWLEDGE:   [] UNABLE TO ASSESS AT THIS TIME   [x] AVERAGE   [] ABOVE AVERAGE   [] BELOW AVERAGE      [] GOOD TO EXCELLENT KNOWLEDGE OF CURRENT EVENTS   [] POOR TO NO KNLEDGE OF CURRENT EVENTS           ABNORMAL MOVEMENTS:   [x] NONE   [] TICS   [] TREMORS   [] BIZZARE      [] FACE   [] TRUNK   [] EXTREMETIES   [] GESTURES        SLEEP:   [x] GOOD   [] FAIR   [] POOR      MUSCLE STRENGTH AND TONE   [x] WNL   [] ATROPHY   [] SPASTIC        [] FLACCID   [] COGWHEEL         Diagnoses/Impressions:    ICD-10-CM    1. Recurrent major depressive disorder, in partial remission ([facility] [address]  F33.41       2. Primary insomnia  F51.01 zolpidem (AMBIEN) 10 MG tablet      3.  Generalized anxiety disorder  F41.1 ALPRAZolam (XANAX) 0.25 MG tablet          TREATMENT GOALS:  Symptom reduction, Medication adherence, maintain therapeutic gains    LABS/IMAGING:    []  Ordered [x]  Reviewed []  New Labs Ordered:     LAB  WBC   Date/Time Value Ref Range Status   08/21/2021 03:19 AM 10.8 4.3 - 11.1 K/uL Final     Hemoglobin   Date/Time Value Ref Range Status   08/21/2021 03:19 AM 10.5 (L) 11.7 - 15.4 g/dL Final     Hematocrit   Date/Time Value Ref Range Status   08/21/2021 03:19 AM 32.1 (L) 35.8 - 46.3 % Final     Platelets   Date/Time Value Ref Range Status   08/21/2021 03:19  150 - 450 K/uL Final     Sodium   Date/Time Value Ref Range Status   08/03/2021 11:50  136 - 145 mmol/L Final     Potassium   Date/Time Value Ref Range Status   08/03/2021 11:50 AM 3.8 3.5 - 5.1 mmol/L Final     Chloride   Date/Time Value Ref Range Status   08/03/2021 11:50  98 - 107 mmol/L Final     CO2   Date/Time Value Ref Range Status   08/03/2021 11:50 AM 29 21 - 32 mmol/L Final     BUN   Date/Time Value Ref Range Status   08/03/2021 11:50 AM 10 8 - 23 MG/DL Final     ALT   Date/Time Value Ref Range Status   04/29/2021 09:12 AM 28 12 - 65 U/L Final     AST   Date/Time Value Ref Range Status   04/29/2021 09:12 AM 29 15 - 37 U/L Final       Please refer to the lab tab in the epic and care everywhere for the most recent lab results. Plan:     [x]  Medication ordered: Effexor, Ambien, Xanax to target depression, anxiety, insomnia. [x]  Medication education/counseling provided  Medication dosage and time to take, purpose/expected benefits/risks, common side effects, lab monitoring required and reason, expected length of treatment, risk of no treatment, effects on pregnancy/nursing, financial availability. Educated patient on  side effects/risks/benefits of meds including cardiac arrhythmias, suicidal ideations, orthostatic hypotension, serotonin syndrome, risk of susy/hypomania from antidepressants, withdrawals from abrupt discontinuation of meds, risk of bleeding, risk of seizures, addiction potential, memory impairment with long term use of benzos, respiratory depression, complex sleep behaviors on Ambien including sleepwalking, driving while asleep, making phone calls while asleep, preparing and eating food while asleep, high blood pressure, dizziness, drowsiness, sedation , risk of falls, patient advised to make sure that she is not dizzy or drowsy before she does anything that requires for her to be alert like driving. Risk & benefits discussed: including but not limited to possible off-label medication usage. [x] Follow MSE for sxs improvement     I have reviewed the patients controlled substance prescription history, as maintained in the [facility] [place], operated by [facility] prescription monitoring program, so that the prescription(s) for a  controlled substance can be given. Recommendations and Referrals: Follow up with : MD, requires monitoring of response to medication, requires monitoring of medication side effects.     Time until next PMA:     Follow up with Mental Health Clinicians: psychotherapy interventions, improve level of functioning, monitoring to prevent decompensation /hospitalization, monitoring to maintain therapeutic gains, symptoms (resolving and controlled)           Psychotherapy note:                                __10_ Minutes of psychotherapy     [x]  Supportive psychotherapy, Patient discussed certain situational and personal stressors ongoing in her life at this time, weight management d/w the patient. Sleep hygiene d/w patient. Patient allowed to vent out her emotions. Scenarios were reviewed using role playing and CBT techniques in order to increase insight and decrease anxiety. []  Disposition planning      []  Dangerous and will not contract for safety in the community    **Pateint has been notified: They are to call 911 or go to their nearest E.R. if they are experiencing a medical emergency or suicidal ideations/homicidal ideations. **  All ancillary documentation entered reviewed by provider. PLEASE NOTE:  This document has been produced in part or whole using voice recognition software. Proofread however unrecognized errors in transcription may be present.         Tammy Sanchez MD

## 2023-06-26 DIAGNOSIS — E78.5 HYPERLIPIDEMIA, UNSPECIFIED HYPERLIPIDEMIA TYPE: ICD-10-CM

## 2023-06-26 DIAGNOSIS — K21.00 GASTRO-ESOPHAGEAL REFLUX DISEASE WITH ESOPHAGITIS, WITHOUT BLEEDING: ICD-10-CM

## 2023-06-27 RX ORDER — OMEPRAZOLE 40 MG/1
CAPSULE, DELAYED RELEASE ORAL
Qty: 90 CAPSULE | Refills: 0 | OUTPATIENT
Start: 2023-06-27

## 2023-06-27 RX ORDER — ATORVASTATIN CALCIUM 80 MG/1
TABLET, FILM COATED ORAL
Qty: 90 TABLET | Refills: 0 | OUTPATIENT
Start: 2023-06-27

## 2023-07-25 DIAGNOSIS — E78.5 HYPERLIPIDEMIA, UNSPECIFIED HYPERLIPIDEMIA TYPE: ICD-10-CM

## 2023-07-25 DIAGNOSIS — K21.00 GASTRO-ESOPHAGEAL REFLUX DISEASE WITH ESOPHAGITIS, WITHOUT BLEEDING: ICD-10-CM

## 2023-07-26 RX ORDER — ATORVASTATIN CALCIUM 80 MG/1
TABLET, FILM COATED ORAL
Qty: 90 TABLET | Refills: 0 | OUTPATIENT
Start: 2023-07-26

## 2023-07-26 RX ORDER — OMEPRAZOLE 40 MG/1
CAPSULE, DELAYED RELEASE ORAL
Qty: 90 CAPSULE | Refills: 0 | OUTPATIENT
Start: 2023-07-26

## 2023-08-18 DIAGNOSIS — E78.5 HYPERLIPIDEMIA, UNSPECIFIED HYPERLIPIDEMIA TYPE: ICD-10-CM

## 2023-08-18 RX ORDER — ATORVASTATIN CALCIUM 80 MG/1
TABLET, FILM COATED ORAL
Qty: 90 TABLET | Refills: 0 | OUTPATIENT
Start: 2023-08-18

## 2023-08-23 ENCOUNTER — OFFICE VISIT (OUTPATIENT)
Dept: PRIMARY CARE CLINIC | Facility: CLINIC | Age: 68
End: 2023-08-23
Payer: MEDICARE

## 2023-08-23 VITALS
HEIGHT: 65 IN | SYSTOLIC BLOOD PRESSURE: 129 MMHG | OXYGEN SATURATION: 100 % | WEIGHT: 189 LBS | BODY MASS INDEX: 31.49 KG/M2 | DIASTOLIC BLOOD PRESSURE: 59 MMHG | HEART RATE: 91 BPM | TEMPERATURE: 97.3 F

## 2023-08-23 DIAGNOSIS — D86.0 SARCOIDOSIS OF LUNG (HCC): ICD-10-CM

## 2023-08-23 DIAGNOSIS — K21.00 GASTRO-ESOPHAGEAL REFLUX DISEASE WITH ESOPHAGITIS, WITHOUT BLEEDING: ICD-10-CM

## 2023-08-23 DIAGNOSIS — M81.0 AGE-RELATED OSTEOPOROSIS WITHOUT CURRENT PATHOLOGICAL FRACTURE: ICD-10-CM

## 2023-08-23 DIAGNOSIS — M81.0 OSTEOPOROSIS, UNSPECIFIED OSTEOPOROSIS TYPE, UNSPECIFIED PATHOLOGICAL FRACTURE PRESENCE: ICD-10-CM

## 2023-08-23 DIAGNOSIS — Z87.39 HX OF OSTEOPOROSIS: ICD-10-CM

## 2023-08-23 DIAGNOSIS — G89.29 CHRONIC LEFT SHOULDER PAIN: ICD-10-CM

## 2023-08-23 DIAGNOSIS — Z71.3 DIETARY COUNSELING AND SURVEILLANCE: ICD-10-CM

## 2023-08-23 DIAGNOSIS — M85.80 OSTEOPENIA, UNSPECIFIED LOCATION: ICD-10-CM

## 2023-08-23 DIAGNOSIS — J44.9 CHRONIC OBSTRUCTIVE PULMONARY DISEASE, UNSPECIFIED COPD TYPE (HCC): ICD-10-CM

## 2023-08-23 DIAGNOSIS — Z98.84 S/P GASTRIC BYPASS: ICD-10-CM

## 2023-08-23 DIAGNOSIS — E66.9 OBESITY, UNSPECIFIED CLASSIFICATION, UNSPECIFIED OBESITY TYPE, UNSPECIFIED WHETHER SERIOUS COMORBIDITY PRESENT: ICD-10-CM

## 2023-08-23 DIAGNOSIS — E78.5 HYPERLIPIDEMIA, UNSPECIFIED HYPERLIPIDEMIA TYPE: ICD-10-CM

## 2023-08-23 DIAGNOSIS — M54.2 NECK PAIN ON LEFT SIDE: ICD-10-CM

## 2023-08-23 DIAGNOSIS — M25.512 CHRONIC LEFT SHOULDER PAIN: ICD-10-CM

## 2023-08-23 DIAGNOSIS — M79.602 LEFT ARM PAIN: ICD-10-CM

## 2023-08-23 DIAGNOSIS — G47.33 OBSTRUCTIVE SLEEP APNEA (ADULT) (PEDIATRIC): ICD-10-CM

## 2023-08-23 DIAGNOSIS — Z12.31 SCREENING MAMMOGRAM FOR BREAST CANCER: ICD-10-CM

## 2023-08-23 DIAGNOSIS — Z00.00 MEDICARE ANNUAL WELLNESS VISIT, SUBSEQUENT: Primary | ICD-10-CM

## 2023-08-23 DIAGNOSIS — Z00.00 MEDICARE ANNUAL WELLNESS VISIT, SUBSEQUENT: ICD-10-CM

## 2023-08-23 LAB
ALBUMIN SERPL-MCNC: 4.1 G/DL (ref 3.2–4.6)
ALBUMIN/GLOB SERPL: 1.4 (ref 0.4–1.6)
ALP SERPL-CCNC: 84 U/L (ref 50–136)
ALT SERPL-CCNC: 25 U/L (ref 12–65)
ANION GAP SERPL CALC-SCNC: 4 MMOL/L (ref 2–11)
AST SERPL-CCNC: 26 U/L (ref 15–37)
BASOPHILS # BLD: 0 K/UL (ref 0–0.2)
BASOPHILS NFR BLD: 1 % (ref 0–2)
BILIRUB SERPL-MCNC: 1 MG/DL (ref 0.2–1.1)
BUN SERPL-MCNC: 8 MG/DL (ref 8–23)
CALCIUM SERPL-MCNC: 9.6 MG/DL (ref 8.3–10.4)
CHLORIDE SERPL-SCNC: 107 MMOL/L (ref 101–110)
CHOLEST SERPL-MCNC: 143 MG/DL
CO2 SERPL-SCNC: 30 MMOL/L (ref 21–32)
CREAT SERPL-MCNC: 0.8 MG/DL (ref 0.6–1)
DIFFERENTIAL METHOD BLD: ABNORMAL
EOSINOPHIL # BLD: 0.1 K/UL (ref 0–0.8)
EOSINOPHIL NFR BLD: 1 % (ref 0.5–7.8)
ERYTHROCYTE [DISTWIDTH] IN BLOOD BY AUTOMATED COUNT: 13.9 % (ref 11.9–14.6)
GLOBULIN SER CALC-MCNC: 2.9 G/DL (ref 2.8–4.5)
GLUCOSE SERPL-MCNC: 74 MG/DL (ref 65–100)
HCT VFR BLD AUTO: 42.4 % (ref 35.8–46.3)
HDLC SERPL-MCNC: 78 MG/DL (ref 40–60)
HDLC SERPL: 1.8
HGB BLD-MCNC: 13 G/DL (ref 11.7–15.4)
IMM GRANULOCYTES # BLD AUTO: 0 K/UL (ref 0–0.5)
IMM GRANULOCYTES NFR BLD AUTO: 1 % (ref 0–5)
LDLC SERPL CALC-MCNC: 48.4 MG/DL
LYMPHOCYTES # BLD: 2.5 K/UL (ref 0.5–4.6)
LYMPHOCYTES NFR BLD: 30 % (ref 13–44)
MCH RBC QN AUTO: 26.7 PG (ref 26.1–32.9)
MCHC RBC AUTO-ENTMCNC: 30.7 G/DL (ref 31.4–35)
MCV RBC AUTO: 87.2 FL (ref 82–102)
MONOCYTES # BLD: 0.6 K/UL (ref 0.1–1.3)
MONOCYTES NFR BLD: 7 % (ref 4–12)
NEUTS SEG # BLD: 5.2 K/UL (ref 1.7–8.2)
NEUTS SEG NFR BLD: 61 % (ref 43–78)
NRBC # BLD: 0 K/UL (ref 0–0.2)
PLATELET # BLD AUTO: 289 K/UL (ref 150–450)
PMV BLD AUTO: 9.6 FL (ref 9.4–12.3)
POTASSIUM SERPL-SCNC: 4.9 MMOL/L (ref 3.5–5.1)
PROT SERPL-MCNC: 7 G/DL (ref 6.3–8.2)
RBC # BLD AUTO: 4.86 M/UL (ref 4.05–5.2)
SODIUM SERPL-SCNC: 141 MMOL/L (ref 133–143)
TRIGL SERPL-MCNC: 83 MG/DL (ref 35–150)
TSH W FREE THYROID IF ABNORMAL: 0.58 UIU/ML (ref 0.36–3.74)
VIT B12 SERPL-MCNC: 2501 PG/ML (ref 193–986)
VLDLC SERPL CALC-MCNC: 16.6 MG/DL (ref 6–23)
WBC # BLD AUTO: 8.4 K/UL (ref 4.3–11.1)

## 2023-08-23 PROCEDURE — 1123F ACP DISCUSS/DSCN MKR DOCD: CPT | Performed by: FAMILY MEDICINE

## 2023-08-23 PROCEDURE — G8399 PT W/DXA RESULTS DOCUMENT: HCPCS | Performed by: FAMILY MEDICINE

## 2023-08-23 PROCEDURE — G8417 CALC BMI ABV UP PARAM F/U: HCPCS | Performed by: FAMILY MEDICINE

## 2023-08-23 PROCEDURE — G0439 PPPS, SUBSEQ VISIT: HCPCS | Performed by: FAMILY MEDICINE

## 2023-08-23 PROCEDURE — 3074F SYST BP LT 130 MM HG: CPT | Performed by: FAMILY MEDICINE

## 2023-08-23 PROCEDURE — 3023F SPIROM DOC REV: CPT | Performed by: FAMILY MEDICINE

## 2023-08-23 PROCEDURE — 3017F COLORECTAL CA SCREEN DOC REV: CPT | Performed by: FAMILY MEDICINE

## 2023-08-23 PROCEDURE — 3078F DIAST BP <80 MM HG: CPT | Performed by: FAMILY MEDICINE

## 2023-08-23 PROCEDURE — 1090F PRES/ABSN URINE INCON ASSESS: CPT | Performed by: FAMILY MEDICINE

## 2023-08-23 PROCEDURE — 1036F TOBACCO NON-USER: CPT | Performed by: FAMILY MEDICINE

## 2023-08-23 PROCEDURE — G8427 DOCREV CUR MEDS BY ELIG CLIN: HCPCS | Performed by: FAMILY MEDICINE

## 2023-08-23 PROCEDURE — 99214 OFFICE O/P EST MOD 30 MIN: CPT | Performed by: FAMILY MEDICINE

## 2023-08-23 RX ORDER — ATORVASTATIN CALCIUM 80 MG/1
80 TABLET, FILM COATED ORAL DAILY
Qty: 90 TABLET | Refills: 0 | Status: SHIPPED | OUTPATIENT
Start: 2023-08-23

## 2023-08-23 RX ORDER — ALENDRONATE SODIUM 70 MG/1
70 TABLET ORAL
Qty: 4 TABLET | Refills: 2 | Status: SHIPPED | OUTPATIENT
Start: 2023-08-23

## 2023-08-23 RX ORDER — OMEPRAZOLE 40 MG/1
40 CAPSULE, DELAYED RELEASE ORAL DAILY
Qty: 90 CAPSULE | Refills: 0 | Status: SHIPPED | OUTPATIENT
Start: 2023-08-23

## 2023-08-23 ASSESSMENT — VISUAL ACUITY
OS_CC: 20/20
OD_CC: 20/25

## 2023-08-23 ASSESSMENT — PATIENT HEALTH QUESTIONNAIRE - PHQ9
1. LITTLE INTEREST OR PLEASURE IN DOING THINGS: 1
SUM OF ALL RESPONSES TO PHQ QUESTIONS 1-9: 9
9. THOUGHTS THAT YOU WOULD BE BETTER OFF DEAD, OR OF HURTING YOURSELF: 0
SUM OF ALL RESPONSES TO PHQ QUESTIONS 1-9: 9
6. FEELING BAD ABOUT YOURSELF - OR THAT YOU ARE A FAILURE OR HAVE LET YOURSELF OR YOUR FAMILY DOWN: 0
4. FEELING TIRED OR HAVING LITTLE ENERGY: 3
SUM OF ALL RESPONSES TO PHQ9 QUESTIONS 1 & 2: 2
SUM OF ALL RESPONSES TO PHQ QUESTIONS 1-9: 9
7. TROUBLE CONCENTRATING ON THINGS, SUCH AS READING THE NEWSPAPER OR WATCHING TELEVISION: 0
SUM OF ALL RESPONSES TO PHQ QUESTIONS 1-9: 9
2. FEELING DOWN, DEPRESSED OR HOPELESS: 1
3. TROUBLE FALLING OR STAYING ASLEEP: 0
10. IF YOU CHECKED OFF ANY PROBLEMS, HOW DIFFICULT HAVE THESE PROBLEMS MADE IT FOR YOU TO DO YOUR WORK, TAKE CARE OF THINGS AT HOME, OR GET ALONG WITH OTHER PEOPLE: 0
8. MOVING OR SPEAKING SO SLOWLY THAT OTHER PEOPLE COULD HAVE NOTICED. OR THE OPPOSITE, BEING SO FIGETY OR RESTLESS THAT YOU HAVE BEEN MOVING AROUND A LOT MORE THAN USUAL: 1
5. POOR APPETITE OR OVEREATING: 3

## 2023-08-23 ASSESSMENT — LIFESTYLE VARIABLES
HOW OFTEN DO YOU HAVE A DRINK CONTAINING ALCOHOL: 2-4 TIMES A MONTH
HOW MANY STANDARD DRINKS CONTAINING ALCOHOL DO YOU HAVE ON A TYPICAL DAY: 1 OR 2

## 2023-08-23 NOTE — PROGRESS NOTES
Attends Club or Organization Meetings:     Marital Status:    Intimate Partner Violence:     Fear of Current or Ex-Partner:     Emotionally Abused:     Physically Abused:     Sexually Abused: Allergies   Allergen Reactions    Codeine Anaphylaxis, Rash and Nausea and Vomiting     UPDATE: Patient states that codeine only causes rash and her to itch. She says she can take it with benadryl. Current Outpatient Medications   Medication Sig Dispense Refill    Eliquis 2.5 mg tablet TAKE 1 TABLET BY MOUTH EVERY 12 HOURS 70 Tablet 0    alendronate (FOSAMAX) 70 mg tablet Take 1 Tablet by mouth every seven (7) days. 4 Tablet 11    acetaminophen (TYLENOL) 500 mg tablet Take 1,000 mg by mouth every six (6) hours as needed for Pain. ALPRAZolam (XANAX) 0.25 mg tablet Take 1 Tablet by mouth two (2) times daily as needed for Anxiety. Max Daily Amount: 0.5 mg. 60 Tablet 5    zolpidem (Ambien) 10 mg tablet Take 1 Tablet by mouth nightly as needed for Sleep. Max Daily Amount: 10 mg. 30 Tablet 5    venlafaxine-SR (EFFEXOR-XR) 150 mg capsule Take 1 Capsule by mouth daily. 30 Capsule 5    docosahexaenoic acid/epa (FISH OIL PO) Take 1 Tablet by mouth two (2) times a day. atorvastatin (LIPITOR) 80 mg tablet Take 1 Tablet by mouth daily. Indications: excessive fat in the blood 90 Tablet 1    fluticasone propionate (FLONASE) 50 mcg/actuation nasal spray 2 Sprays by Both Nostrils route daily. 1 Bottle 5    pantoprazole (PROTONIX) 40 mg tablet Take 1 Tablet by mouth daily. 90 Tablet 1    loratadine (CLARITIN) 10 mg tablet Take 1 Tab by mouth daily as needed for Allergies. 90 Tab 1    cholecalciferol (VITAMIN D3) 1,000 unit cap Vitamin D3 1,000 unit capsule   Take by oral route. ASCORBATE CALCIUM (VITAMIN C PO) Take 1 Tablet by mouth daily. BIOTIN PO Take 1 Tablet by mouth daily. calcium-cholecalciferol, d3, 600 mg calcium- 200 unit cap Take 2 Tabs by mouth daily.  Indications: HYPOCALCEMIA PREVENTION,

## 2023-08-24 ENCOUNTER — TELEPHONE (OUTPATIENT)
Dept: PRIMARY CARE CLINIC | Facility: CLINIC | Age: 68
End: 2023-08-24

## 2023-08-24 NOTE — TELEPHONE ENCOUNTER
----- Message from Harjit Reynolds MD sent at 8/24/2023  8:16 AM EDT -----  All labs stable/normal except pts B12 is high--pt to stop taking B12 supplements

## 2023-08-28 DIAGNOSIS — K21.00 GASTRO-ESOPHAGEAL REFLUX DISEASE WITH ESOPHAGITIS, WITHOUT BLEEDING: ICD-10-CM

## 2023-08-29 RX ORDER — OMEPRAZOLE 40 MG/1
CAPSULE, DELAYED RELEASE ORAL
Qty: 90 CAPSULE | Refills: 0 | OUTPATIENT
Start: 2023-08-29

## 2023-09-22 ENCOUNTER — OFFICE VISIT (OUTPATIENT)
Dept: ORTHOPEDIC SURGERY | Age: 68
End: 2023-09-22

## 2023-09-22 VITALS — BODY MASS INDEX: 31.49 KG/M2 | WEIGHT: 189 LBS | HEIGHT: 65 IN

## 2023-09-22 DIAGNOSIS — M50.30 DDD (DEGENERATIVE DISC DISEASE), CERVICAL: ICD-10-CM

## 2023-09-22 DIAGNOSIS — M54.2 NECK PAIN: Primary | ICD-10-CM

## 2023-09-22 PROBLEM — Z00.00 MEDICARE ANNUAL WELLNESS VISIT, SUBSEQUENT: Status: RESOLVED | Noted: 2019-05-06 | Resolved: 2023-09-22

## 2023-09-22 RX ORDER — PREDNISONE 10 MG/1
TABLET ORAL
Qty: 48 TABLET | Refills: 0 | Status: SHIPPED | OUTPATIENT
Start: 2023-09-22

## 2023-09-22 NOTE — PROGRESS NOTES
[facility]  Consultation Note    Patient ID:  Name: Guero Balderas  MRN: [id number]  AGE: 70 y.o.  : 1955    Date of Consultation:  2023    CC:   Chief Complaint   Patient presents with    New Patient    Neck Pain         HPI: This is a new patient visit on Guero Balderas, a 50-year-old black female who is complaining of neck pain since a fall about 3 years ago in her house and she has had discomfort on and off since that time. She does report some radiation of the pain going down both arms to about the elbow but is worse on the left than the right. She also gets numbness and tingling that comes and goes involving the entire left hand. Overall she says the neck pain is slowly worsening with time in addition she has had 2 prior right shoulder fractures. She says the pain comes and goes in her neck and seems to be worsened by stress and seems to be improved with heat and ice treatment she has taken Tylenol and used heat and ice but has not done therapy or steroids. Pain is worse when the weather gets cold and down. Past Medical History Includes: She is not a diabetic and she does not have any heart problems, but she does have sarcoidosis of the lungs and she is not on blood thinners. Family History:   Family History   Problem Relation Age of Onset    Other Other         fam hx of liver    Cancer Sister         pancreatic    Cancer Other         lung     Colon Cancer Other     Cancer Father         colon ca    Diabetes Father     Pneumonia Mother     Breast Cancer Neg Hx     Clotting Disorder Mother         Blood clots      Social History:   Social History     Tobacco Use    Smoking status: Never    Smokeless tobacco: Never   Substance Use Topics    Alcohol use:  Yes     Alcohol/week: 11.0 standard drinks of alcohol     Comment: occ beer and wine       ALLERGIES:   Allergies   Allergen Reactions    Codeine Hives and Anaphylaxis     States she CAN

## 2023-10-09 ENCOUNTER — TELEPHONE (OUTPATIENT)
Dept: PRIMARY CARE CLINIC | Facility: CLINIC | Age: 68
End: 2023-10-09

## 2023-10-09 ENCOUNTER — OFFICE VISIT (OUTPATIENT)
Dept: PRIMARY CARE CLINIC | Facility: CLINIC | Age: 68
End: 2023-10-09
Payer: MEDICARE

## 2023-10-09 VITALS — BODY MASS INDEX: 31.45 KG/M2 | WEIGHT: 189 LBS

## 2023-10-09 DIAGNOSIS — J02.9 PHARYNGITIS, UNSPECIFIED ETIOLOGY: ICD-10-CM

## 2023-10-09 DIAGNOSIS — M81.0 OSTEOPOROSIS, UNSPECIFIED OSTEOPOROSIS TYPE, UNSPECIFIED PATHOLOGICAL FRACTURE PRESENCE: ICD-10-CM

## 2023-10-09 DIAGNOSIS — M79.10 MYALGIA: ICD-10-CM

## 2023-10-09 DIAGNOSIS — J44.9 CHRONIC OBSTRUCTIVE PULMONARY DISEASE, UNSPECIFIED COPD TYPE (HCC): ICD-10-CM

## 2023-10-09 DIAGNOSIS — J32.9 SINUSITIS, UNSPECIFIED CHRONICITY, UNSPECIFIED LOCATION: Primary | ICD-10-CM

## 2023-10-09 DIAGNOSIS — E78.5 HYPERLIPIDEMIA, UNSPECIFIED HYPERLIPIDEMIA TYPE: ICD-10-CM

## 2023-10-09 DIAGNOSIS — M05.9 RHEUMATOID ARTHRITIS WITH RHEUMATOID FACTOR, UNSPECIFIED (HCC): ICD-10-CM

## 2023-10-09 PROCEDURE — 1036F TOBACCO NON-USER: CPT | Performed by: FAMILY MEDICINE

## 2023-10-09 PROCEDURE — 3023F SPIROM DOC REV: CPT | Performed by: FAMILY MEDICINE

## 2023-10-09 PROCEDURE — 3017F COLORECTAL CA SCREEN DOC REV: CPT | Performed by: FAMILY MEDICINE

## 2023-10-09 PROCEDURE — 1090F PRES/ABSN URINE INCON ASSESS: CPT | Performed by: FAMILY MEDICINE

## 2023-10-09 PROCEDURE — 99214 OFFICE O/P EST MOD 30 MIN: CPT | Performed by: FAMILY MEDICINE

## 2023-10-09 PROCEDURE — G8427 DOCREV CUR MEDS BY ELIG CLIN: HCPCS | Performed by: FAMILY MEDICINE

## 2023-10-09 PROCEDURE — G8399 PT W/DXA RESULTS DOCUMENT: HCPCS | Performed by: FAMILY MEDICINE

## 2023-10-09 PROCEDURE — G8484 FLU IMMUNIZE NO ADMIN: HCPCS | Performed by: FAMILY MEDICINE

## 2023-10-09 PROCEDURE — G8417 CALC BMI ABV UP PARAM F/U: HCPCS | Performed by: FAMILY MEDICINE

## 2023-10-09 PROCEDURE — 1123F ACP DISCUSS/DSCN MKR DOCD: CPT | Performed by: FAMILY MEDICINE

## 2023-10-09 RX ORDER — AMOXICILLIN 875 MG/1
875 TABLET, COATED ORAL 2 TIMES DAILY
Qty: 20 TABLET | Refills: 0 | Status: SHIPPED | OUTPATIENT
Start: 2023-10-09 | End: 2023-10-19

## 2023-10-09 RX ORDER — FLUTICASONE PROPIONATE 50 MCG
2 SPRAY, SUSPENSION (ML) NASAL DAILY
Qty: 1 EACH | Refills: 2 | Status: SHIPPED | OUTPATIENT
Start: 2023-10-09

## 2023-10-10 NOTE — PROGRESS NOTES
[facility] [address], [address] [place] [address]  Office : [phone]  Fax : [phone]  Pt was seen by synchronous (real-time) audio-video technology   I was at my home office while conducting this encounter  Pt was at home during the visit  Pts  healthcare decision maker is aware that this patient-initiated Telehealth encounter is a billable service, with coverage as determined by her insurance carrier. She is aware that she may receive a bill and has provided verbal consent to proceed:       Subjective: The patient is a 78 y.o. female  who presents for f/u on   productive cough,sorethroat,earache,facial pain  and chest/sinus congestion for few days-worse last 2 days--no fever/cp/sob/wheezing/rash/abd symptoms-pt tried some OTC meds without relief.   No vitals at home-unable to get it at pharmacy         chronic med problems  Hypertension--Pt BP been controlled with meds  Prediabetes -pts blood sugar stable on med  Hyperlipidemia--pts on low carb diet and low fat diet -stable on med  Gerd -stable on diet /med  Sarcoidosis/pulmonary nodule-followed by lung specialist-stable-good lung capacity  Copd-stable on med  Pt seeing psychiatrist now depression-stable  Low back pain much better  HX OF DVT/PE--when pt was pregnant  S/p right knee replacement surgery-doing well              Patient Active Problem List   Diagnosis Code    Osteoporosis 733.0    Celiac disease K90.0    Hot flashes R23.2    RA (rheumatoid arthritis) ([facility]) M06.9    ANTHONY (iron deficiency anemia) D50.9    LEE (obstructive sleep apnea) G47.33    Dysthymic disorder F34.1    Nonallopathic lesion of sacral region M99.9    Lumbago M54.50    Allergic rhinitis J30.9    Osteoarthritis M19.90    Malaise and fatigue R53.81, R53.83    Gastroesophageal reflux disease K21.9    Mixed hyperlipidemia E78.2    Pulmonary sarcoidosis ([facility]) D86.0    RLS (restless legs syndrome) G25.81    Other speech disturbances R47.89    History of

## 2023-10-25 ENCOUNTER — TELEMEDICINE (OUTPATIENT)
Dept: BEHAVIORAL/MENTAL HEALTH CLINIC | Age: 68
End: 2023-10-25
Payer: MEDICARE

## 2023-10-25 DIAGNOSIS — F41.1 GENERALIZED ANXIETY DISORDER: ICD-10-CM

## 2023-10-25 DIAGNOSIS — F51.01 PRIMARY INSOMNIA: ICD-10-CM

## 2023-10-25 DIAGNOSIS — F33.41 RECURRENT MAJOR DEPRESSIVE DISORDER, IN PARTIAL REMISSION (HCC): Primary | ICD-10-CM

## 2023-10-25 PROCEDURE — G8427 DOCREV CUR MEDS BY ELIG CLIN: HCPCS | Performed by: PSYCHIATRY & NEUROLOGY

## 2023-10-25 PROCEDURE — 99214 OFFICE O/P EST MOD 30 MIN: CPT | Performed by: PSYCHIATRY & NEUROLOGY

## 2023-10-25 PROCEDURE — 3017F COLORECTAL CA SCREEN DOC REV: CPT | Performed by: PSYCHIATRY & NEUROLOGY

## 2023-10-25 PROCEDURE — G8399 PT W/DXA RESULTS DOCUMENT: HCPCS | Performed by: PSYCHIATRY & NEUROLOGY

## 2023-10-25 PROCEDURE — 1123F ACP DISCUSS/DSCN MKR DOCD: CPT | Performed by: PSYCHIATRY & NEUROLOGY

## 2023-10-25 PROCEDURE — 1090F PRES/ABSN URINE INCON ASSESS: CPT | Performed by: PSYCHIATRY & NEUROLOGY

## 2023-10-25 RX ORDER — VENLAFAXINE HYDROCHLORIDE 150 MG/1
150 CAPSULE, EXTENDED RELEASE ORAL DAILY
Qty: 30 CAPSULE | Refills: 3 | Status: SHIPPED | OUTPATIENT
Start: 2023-10-25

## 2023-10-25 RX ORDER — ZOLPIDEM TARTRATE 10 MG/1
10 TABLET ORAL NIGHTLY PRN
Qty: 30 TABLET | Refills: 3 | Status: SHIPPED | OUTPATIENT
Start: 2023-10-25 | End: 2024-02-22

## 2023-10-25 RX ORDER — ALPRAZOLAM 0.25 MG/1
0.25 TABLET ORAL 3 TIMES DAILY PRN
Qty: 60 TABLET | Refills: 3 | Status: SHIPPED | OUTPATIENT
Start: 2023-10-25 | End: 2024-02-22

## 2023-10-25 ASSESSMENT — PATIENT HEALTH QUESTIONNAIRE - PHQ9
9. THOUGHTS THAT YOU WOULD BE BETTER OFF DEAD, OR OF HURTING YOURSELF: 0
4. FEELING TIRED OR HAVING LITTLE ENERGY: 0
1. LITTLE INTEREST OR PLEASURE IN DOING THINGS: 0
SUM OF ALL RESPONSES TO PHQ QUESTIONS 1-9: 1
8. MOVING OR SPEAKING SO SLOWLY THAT OTHER PEOPLE COULD HAVE NOTICED. OR THE OPPOSITE, BEING SO FIGETY OR RESTLESS THAT YOU HAVE BEEN MOVING AROUND A LOT MORE THAN USUAL: 0
5. POOR APPETITE OR OVEREATING: 0
10. IF YOU CHECKED OFF ANY PROBLEMS, HOW DIFFICULT HAVE THESE PROBLEMS MADE IT FOR YOU TO DO YOUR WORK, TAKE CARE OF THINGS AT HOME, OR GET ALONG WITH OTHER PEOPLE: 0
SUM OF ALL RESPONSES TO PHQ9 QUESTIONS 1 & 2: 1
2. FEELING DOWN, DEPRESSED OR HOPELESS: 1
3. TROUBLE FALLING OR STAYING ASLEEP: 0
6. FEELING BAD ABOUT YOURSELF - OR THAT YOU ARE A FAILURE OR HAVE LET YOURSELF OR YOUR FAMILY DOWN: 0
7. TROUBLE CONCENTRATING ON THINGS, SUCH AS READING THE NEWSPAPER OR WATCHING TELEVISION: 0

## 2023-10-25 ASSESSMENT — ANXIETY QUESTIONNAIRES
1. FEELING NERVOUS, ANXIOUS, OR ON EDGE: 0
IF YOU CHECKED OFF ANY PROBLEMS ON THIS QUESTIONNAIRE, HOW DIFFICULT HAVE THESE PROBLEMS MADE IT FOR YOU TO DO YOUR WORK, TAKE CARE OF THINGS AT HOME, OR GET ALONG WITH OTHER PEOPLE: SOMEWHAT DIFFICULT
5. BEING SO RESTLESS THAT IT IS HARD TO SIT STILL: 0
3. WORRYING TOO MUCH ABOUT DIFFERENT THINGS: 1
2. NOT BEING ABLE TO STOP OR CONTROL WORRYING: 1
GAD7 TOTAL SCORE: 4
4. TROUBLE RELAXING: 0
6. BECOMING EASILY ANNOYED OR IRRITABLE: 1
7. FEELING AFRAID AS IF SOMETHING AWFUL MIGHT HAPPEN: 1

## 2023-10-25 NOTE — PROGRESS NOTES
Platelets   Date/Time Value Ref Range Status   08/23/2023 11:28  150 - 450 K/uL Final     Sodium   Date/Time Value Ref Range Status   08/23/2023 11:28  133 - 143 mmol/L Final     Potassium   Date/Time Value Ref Range Status   08/23/2023 11:28 AM 4.9 3.5 - 5.1 mmol/L Final     Chloride   Date/Time Value Ref Range Status   08/23/2023 11:28  101 - 110 mmol/L Final     CO2   Date/Time Value Ref Range Status   08/23/2023 11:28 AM 30 21 - 32 mmol/L Final     BUN   Date/Time Value Ref Range Status   08/23/2023 11:28 AM 8 8 - 23 MG/DL Final     Phosphorus   Date/Time Value Ref Range Status   11/30/2015 05:19 AM 3.4 2.5 - 4.9 mg/dL Final     ALT   Date/Time Value Ref Range Status   08/23/2023 11:28 AM 25 12 - 65 U/L Final     AST   Date/Time Value Ref Range Status   08/23/2023 11:28 AM 26 15 - 37 U/L Final       Please refer to the lab tab in the epic and care everywhere for the most recent lab results. Plan:     [x]  Medication ordered: Xanax, Effexor, Ambien to target depression, anxiety, insomnia. [x]  Medication education/counseling provided  Medication dosage and time to take, purpose/expected benefits/risks, common side effects, lab monitoring required and reason, expected length of treatment, risk of no treatment, effects on pregnancy/nursing, financial availability discussed.  Educated patient on  side effects/risks/benefits of meds including cardiac arrhythmias, suicidal ideations, orthostatic hypotension, serotonin syndrome, risk of susy/hypomania from antidepressants, withdrawals from abrupt discontinuation of meds,  risk of bleeding, risk of seizures, addiction potential, memory impairment with long term use of benzos, respiratory depression, complex sleep behaviors on Ambien including sleepwalking, driving while asleep, making phone calls while asleep, preparing and eating food while asleep, high blood pressure, dizziness, drowsiness, sedation , risk of falls, Risk & benefits

## 2023-11-01 ENCOUNTER — TELEPHONE (OUTPATIENT)
Dept: BEHAVIORAL/MENTAL HEALTH CLINIC | Age: 68
End: 2023-11-01

## 2023-11-01 DIAGNOSIS — F51.01 PRIMARY INSOMNIA: ICD-10-CM

## 2023-11-01 RX ORDER — ZOLPIDEM TARTRATE 10 MG/1
10 TABLET ORAL NIGHTLY PRN
Qty: 30 TABLET | Refills: 2 | OUTPATIENT
Start: 2023-11-01

## 2023-11-01 NOTE — TELEPHONE ENCOUNTER
Spoke with pharmacy, they do have the prescription it is just not ready for . Patient has been notified.

## 2023-11-01 NOTE — TELEPHONE ENCOUNTER
Pt called stating that the pharmacy did not received the prescription for Ambien. Please call the pharmacy.

## 2023-11-03 ENCOUNTER — OFFICE VISIT (OUTPATIENT)
Dept: ORTHOPEDIC SURGERY | Age: 68
End: 2023-11-03

## 2023-11-03 DIAGNOSIS — M50.30 DDD (DEGENERATIVE DISC DISEASE), CERVICAL: ICD-10-CM

## 2023-11-03 DIAGNOSIS — M79.601 RIGHT ARM PAIN: ICD-10-CM

## 2023-11-03 DIAGNOSIS — M54.2 NECK PAIN: Primary | ICD-10-CM

## 2023-11-03 NOTE — PROGRESS NOTES
Name: Best Wilson  YOB: 1955  Gender: female  MRN: [id number]    DATE: 11/3/2023    CC: Follow-up (6 week recheck)       HPI:  This is a recheck on patient Best Wilson she is a 71-year-old female who is complaining of neck pain and bilateral arm pain since a fall about 3 years ago. She says the left arm pain is worse than the right and she gets numbness and tingling in all 10 of her fingers at times. We completed conservative care with 6 weeks of physical therapy p.o. steroids and anti-inflammatories and she says she is still hurting. And does she does not seem to be any significantly improved. RADIOGRAPHS: No new x-rays     PE: No change in exam    CURRENT MEDS:     Current Outpatient Medications:     zolpidem (AMBIEN) 10 MG tablet, Take 1 tablet by mouth nightly as needed for Sleep for up to 120 days. Max Daily Amount: 10 mg, Disp: 30 tablet, Rfl: 3    ALPRAZolam (XANAX) 0.25 MG tablet, Take 1 tablet by mouth 3 times daily as needed for Anxiety for up to 120 days. Max Daily Amount: 0.75 mg, Disp: 60 tablet, Rfl: 3    venlafaxine (EFFEXOR XR) 150 MG extended release capsule, Take 1 capsule by mouth daily, Disp: 30 capsule, Rfl: 3    fluticasone (FLONASE) 50 MCG/ACT nasal spray, 2 sprays by Each Nostril route daily, Disp: 1 each, Rfl: 2    predniSONE (DELTASONE) 10 MG tablet, Day 1-4: 2 tablets before breakfast, 1 after lunch, 1 after dinner, 2 tablets at bedtime. Day 5-8: 1 tablet before breakfast, 1 after lunch, 1 after dinner, 1 at bedtime. Day 9-12: 1 tablet before breakfast and 1 at bedtime. , Disp: 48 tablet, Rfl: 0    omeprazole (PRILOSEC) 40 MG delayed release capsule, Take 1 capsule by mouth daily, Disp: 90 capsule, Rfl: 0    atorvastatin (LIPITOR) 80 MG tablet, Take 1 tablet by mouth daily, Disp: 90 tablet, Rfl: 0    alendronate (FOSAMAX) 70 MG tablet, Take 1 tablet by mouth every 7 days Take with a full glass of water upon arising for the day.  Consume on an

## 2023-11-07 DIAGNOSIS — M54.2 NECK PAIN: Primary | ICD-10-CM

## 2023-11-07 DIAGNOSIS — M79.601 RIGHT ARM PAIN: ICD-10-CM

## 2023-11-07 DIAGNOSIS — M50.30 DDD (DEGENERATIVE DISC DISEASE), CERVICAL: ICD-10-CM

## 2023-11-07 NOTE — TELEPHONE ENCOUNTER
She is calling again to make sure something gets sent in today. She is really hurting. I told her to check with the pharmacy closer to 5:00.

## 2023-11-07 NOTE — TELEPHONE ENCOUNTER
She is having tremendous pain from her shoulder to her elbow. Her MRI is the 14th. She is not resting at all and wants to know if SALTY will send in a muscle relaxer for her. Tylenol is not helping she says.  [facility] [place] on [address].

## 2023-11-08 RX ORDER — CYCLOBENZAPRINE HCL 10 MG
10 TABLET ORAL 3 TIMES DAILY PRN
Qty: 30 TABLET | Refills: 1 | Status: SHIPPED | OUTPATIENT
Start: 2023-11-08 | End: 2023-11-28

## 2023-11-19 DIAGNOSIS — K21.00 GASTRO-ESOPHAGEAL REFLUX DISEASE WITH ESOPHAGITIS, WITHOUT BLEEDING: ICD-10-CM

## 2023-11-19 DIAGNOSIS — M81.0 OSTEOPOROSIS, UNSPECIFIED OSTEOPOROSIS TYPE, UNSPECIFIED PATHOLOGICAL FRACTURE PRESENCE: ICD-10-CM

## 2023-11-20 RX ORDER — ALENDRONATE SODIUM 70 MG/1
70 TABLET ORAL
Qty: 12 TABLET | OUTPATIENT
Start: 2023-11-20

## 2023-11-20 RX ORDER — OMEPRAZOLE 40 MG/1
CAPSULE, DELAYED RELEASE ORAL DAILY
Qty: 90 CAPSULE | Refills: 0 | OUTPATIENT
Start: 2023-11-20

## 2023-11-28 DIAGNOSIS — J32.9 SINUSITIS, UNSPECIFIED CHRONICITY, UNSPECIFIED LOCATION: ICD-10-CM

## 2023-11-28 DIAGNOSIS — J02.9 PHARYNGITIS, UNSPECIFIED ETIOLOGY: ICD-10-CM

## 2023-11-28 DIAGNOSIS — E78.5 HYPERLIPIDEMIA, UNSPECIFIED HYPERLIPIDEMIA TYPE: ICD-10-CM

## 2023-11-29 RX ORDER — FLUTICASONE PROPIONATE 50 MCG
2 SPRAY, SUSPENSION (ML) NASAL DAILY
OUTPATIENT
Start: 2023-11-29

## 2023-11-29 RX ORDER — ATORVASTATIN CALCIUM 80 MG/1
80 TABLET, FILM COATED ORAL DAILY
Qty: 90 TABLET | Refills: 0 | OUTPATIENT
Start: 2023-11-29

## 2023-11-30 ENCOUNTER — OFFICE VISIT (OUTPATIENT)
Dept: ORTHOPEDIC SURGERY | Age: 68
End: 2023-11-30
Payer: MEDICARE

## 2023-11-30 ENCOUNTER — TELEPHONE (OUTPATIENT)
Dept: ORTHOPEDIC SURGERY | Age: 68
End: 2023-11-30

## 2023-11-30 VITALS — BODY MASS INDEX: 31.49 KG/M2 | HEIGHT: 65 IN | WEIGHT: 189 LBS

## 2023-11-30 DIAGNOSIS — R20.0 BILATERAL HAND NUMBNESS: ICD-10-CM

## 2023-11-30 DIAGNOSIS — R42 VERTIGO: Primary | ICD-10-CM

## 2023-11-30 PROCEDURE — G8484 FLU IMMUNIZE NO ADMIN: HCPCS | Performed by: PHYSICIAN ASSISTANT

## 2023-11-30 PROCEDURE — G8399 PT W/DXA RESULTS DOCUMENT: HCPCS | Performed by: PHYSICIAN ASSISTANT

## 2023-11-30 PROCEDURE — 1123F ACP DISCUSS/DSCN MKR DOCD: CPT | Performed by: PHYSICIAN ASSISTANT

## 2023-11-30 PROCEDURE — 3017F COLORECTAL CA SCREEN DOC REV: CPT | Performed by: PHYSICIAN ASSISTANT

## 2023-11-30 PROCEDURE — G8427 DOCREV CUR MEDS BY ELIG CLIN: HCPCS | Performed by: PHYSICIAN ASSISTANT

## 2023-11-30 PROCEDURE — 1036F TOBACCO NON-USER: CPT | Performed by: PHYSICIAN ASSISTANT

## 2023-11-30 PROCEDURE — G8417 CALC BMI ABV UP PARAM F/U: HCPCS | Performed by: PHYSICIAN ASSISTANT

## 2023-11-30 PROCEDURE — 99214 OFFICE O/P EST MOD 30 MIN: CPT | Performed by: PHYSICIAN ASSISTANT

## 2023-11-30 PROCEDURE — 1090F PRES/ABSN URINE INCON ASSESS: CPT | Performed by: PHYSICIAN ASSISTANT

## 2023-11-30 RX ORDER — MECLIZINE HYDROCHLORIDE 25 MG/1
25 TABLET ORAL DAILY
Qty: 30 TABLET | Refills: 0 | Status: SHIPPED | OUTPATIENT
Start: 2023-11-30 | End: 2023-12-30

## 2023-11-30 RX ORDER — MECLIZINE HYDROCHLORIDE 25 MG/1
25 TABLET ORAL DAILY
Qty: 30 TABLET | Refills: 0 | Status: SHIPPED | OUTPATIENT
Start: 2023-11-30 | End: 2023-11-30

## 2023-11-30 RX ORDER — CYCLOBENZAPRINE HCL 10 MG
TABLET ORAL
COMMUNITY
Start: 2023-11-28

## 2023-11-30 NOTE — PROGRESS NOTES
Name: Chelsey Villanueva  YOB: 1955  Gender: female  MRN: [id number]    CC:   Chief Complaint   Patient presents with    New Patient     MRI Results          HPI:   Chelsey Villanueva is a 70 y.o. female with a PMHx of comorbidities listed below. They present here for evaluation of bilateral hand numbness with neck pain and vertigo. She is previously been managed by Dr. Lansing Epley formerly of the surgery service of this practice. She been referred to me for continuation of care and follow-up after undergoing a cervical MRI. She continues to have intermittent numbness to both of her hands. She has some pain to the right side of her neck in the trapezius distribution to the shoulder. Describes it as a burning or tingling sensation. She also reports that she feels somewhat unsteady on her feet, feels like the room is spinning at moments in time. This does affect her balance to some degree.             Past Medical History Includes:   Past Medical History:   Diagnosis Date    Allergic rhinitis     Anxiety     Arthritis     osteo    Asthma     as a child    Celiac disease     denies    COVID-19 vaccine series completed 3/30/21, 4/29/21    Moderna     Depression     DVT (deep venous thrombosis) ([address]) 2015    Dysthymic disorder     Esophageal reflux     daily med for control     Exophthalmos     Fibromyalgia     GERD (gastroesophageal reflux disease)     daily medication    Hiatal hernia     surgically repaired    History of anemia     Hot flashes     Hypercholesterolemia     Hyperlipidemia     Hyperlipidemia     on med for control     Hypertension     no meds     IBS (irritable bowel syndrome)     ANTHONY (iron deficiency anemia)     HX    Impaired fasting glucose     Liver disease     \"liver cyst\"    Lumbago     Malaise and fatigue     Morbid obesity (HCC)     BMI=32.3    Nonallopathic lesion of sacral region     LEE (obstructive sleep apnea)     no c-pap; not since weight loss     Osteoarthritis

## 2023-11-30 NOTE — TELEPHONE ENCOUNTER
She saw Qian York today and she forgot to ask for something for dizziness. She thinks it is vertigo. She is asking for meclizine. She took that years ago. She has an appt in two weeks with neurology but she wants to know if you can give her enough to last until then. [facility] [place]. Please let her know.

## 2023-12-18 PROBLEM — R42 VERTIGO: Status: ACTIVE | Noted: 2023-12-18

## 2023-12-25 DIAGNOSIS — J32.9 SINUSITIS, UNSPECIFIED CHRONICITY, UNSPECIFIED LOCATION: ICD-10-CM

## 2023-12-25 DIAGNOSIS — J02.9 PHARYNGITIS, UNSPECIFIED ETIOLOGY: ICD-10-CM

## 2023-12-26 RX ORDER — MECLIZINE HYDROCHLORIDE 25 MG/1
25 TABLET ORAL DAILY
Qty: 30 TABLET | Refills: 0 | OUTPATIENT
Start: 2023-12-26

## 2023-12-27 RX ORDER — FLUTICASONE PROPIONATE 50 MCG
2 SPRAY, SUSPENSION (ML) NASAL DAILY
OUTPATIENT
Start: 2023-12-27

## 2023-12-28 DIAGNOSIS — R42 VERTIGO: ICD-10-CM

## 2023-12-28 DIAGNOSIS — J32.9 SINUSITIS, UNSPECIFIED CHRONICITY, UNSPECIFIED LOCATION: ICD-10-CM

## 2023-12-28 DIAGNOSIS — J02.9 PHARYNGITIS, UNSPECIFIED ETIOLOGY: ICD-10-CM

## 2023-12-28 DIAGNOSIS — M50.30 DDD (DEGENERATIVE DISC DISEASE), CERVICAL: ICD-10-CM

## 2023-12-28 DIAGNOSIS — K21.00 GASTRO-ESOPHAGEAL REFLUX DISEASE WITH ESOPHAGITIS, WITHOUT BLEEDING: ICD-10-CM

## 2023-12-28 DIAGNOSIS — J44.9 CHRONIC OBSTRUCTIVE PULMONARY DISEASE, UNSPECIFIED COPD TYPE (HCC): ICD-10-CM

## 2023-12-28 DIAGNOSIS — Z12.31 SCREENING MAMMOGRAM FOR BREAST CANCER: ICD-10-CM

## 2023-12-28 DIAGNOSIS — M05.9 RHEUMATOID ARTHRITIS WITH RHEUMATOID FACTOR, UNSPECIFIED (HCC): ICD-10-CM

## 2023-12-28 DIAGNOSIS — M54.2 CERVICALGIA: ICD-10-CM

## 2023-12-28 DIAGNOSIS — E78.5 HYPERLIPIDEMIA, UNSPECIFIED HYPERLIPIDEMIA TYPE: ICD-10-CM

## 2023-12-28 DIAGNOSIS — M81.0 OSTEOPOROSIS, UNSPECIFIED OSTEOPOROSIS TYPE, UNSPECIFIED PATHOLOGICAL FRACTURE PRESENCE: ICD-10-CM

## 2023-12-28 LAB
ALBUMIN SERPL-MCNC: 3.4 G/DL (ref 3.2–4.6)
ALBUMIN/GLOB SERPL: 1.1 (ref 0.4–1.6)
ALP SERPL-CCNC: 107 U/L (ref 50–136)
ALT SERPL-CCNC: 42 U/L (ref 12–65)
ANION GAP SERPL CALC-SCNC: 6 MMOL/L (ref 2–11)
AST SERPL-CCNC: 24 U/L (ref 15–37)
BASOPHILS # BLD: 0.1 K/UL (ref 0–0.2)
BASOPHILS NFR BLD: 1 % (ref 0–2)
BILIRUB SERPL-MCNC: 0.8 MG/DL (ref 0.2–1.1)
BUN SERPL-MCNC: 17 MG/DL (ref 8–23)
CALCIUM SERPL-MCNC: 9 MG/DL (ref 8.3–10.4)
CHLORIDE SERPL-SCNC: 107 MMOL/L (ref 103–113)
CHOLEST SERPL-MCNC: 136 MG/DL
CO2 SERPL-SCNC: 26 MMOL/L (ref 21–32)
CREAT SERPL-MCNC: 0.9 MG/DL (ref 0.6–1)
DIFFERENTIAL METHOD BLD: ABNORMAL
EOSINOPHIL # BLD: 0.2 K/UL (ref 0–0.8)
EOSINOPHIL NFR BLD: 2 % (ref 0.5–7.8)
ERYTHROCYTE [DISTWIDTH] IN BLOOD BY AUTOMATED COUNT: 14.8 % (ref 11.9–14.6)
GLOBULIN SER CALC-MCNC: 3 G/DL (ref 2.8–4.5)
GLUCOSE SERPL-MCNC: 94 MG/DL (ref 65–100)
HCT VFR BLD AUTO: 41.2 % (ref 35.8–46.3)
HDLC SERPL-MCNC: 65 MG/DL (ref 40–60)
HDLC SERPL: 2.1
HGB BLD-MCNC: 12.6 G/DL (ref 11.7–15.4)
IMM GRANULOCYTES # BLD AUTO: 0.1 K/UL (ref 0–0.5)
IMM GRANULOCYTES NFR BLD AUTO: 1 % (ref 0–5)
LDLC SERPL CALC-MCNC: 51.2 MG/DL
LYMPHOCYTES # BLD: 3.3 K/UL (ref 0.5–4.6)
LYMPHOCYTES NFR BLD: 35 % (ref 13–44)
MCH RBC QN AUTO: 26.8 PG (ref 26.1–32.9)
MCHC RBC AUTO-ENTMCNC: 30.6 G/DL (ref 31.4–35)
MCV RBC AUTO: 87.5 FL (ref 82–102)
MONOCYTES # BLD: 0.7 K/UL (ref 0.1–1.3)
MONOCYTES NFR BLD: 8 % (ref 4–12)
NEUTS SEG # BLD: 4.9 K/UL (ref 1.7–8.2)
NEUTS SEG NFR BLD: 53 % (ref 43–78)
NRBC # BLD: 0 K/UL (ref 0–0.2)
PLATELET # BLD AUTO: 279 K/UL (ref 150–450)
PMV BLD AUTO: 10 FL (ref 9.4–12.3)
POTASSIUM SERPL-SCNC: 4.2 MMOL/L (ref 3.5–5.1)
PROT SERPL-MCNC: 6.4 G/DL (ref 6.3–8.2)
RBC # BLD AUTO: 4.71 M/UL (ref 4.05–5.2)
SODIUM SERPL-SCNC: 139 MMOL/L (ref 136–146)
TRIGL SERPL-MCNC: 99 MG/DL (ref 35–150)
TSH W FREE THYROID IF ABNORMAL: 0.81 UIU/ML (ref 0.36–3.74)
VLDLC SERPL CALC-MCNC: 19.8 MG/DL (ref 6–23)
WBC # BLD AUTO: 9.3 K/UL (ref 4.3–11.1)

## 2023-12-29 DIAGNOSIS — E78.5 HYPERLIPIDEMIA, UNSPECIFIED HYPERLIPIDEMIA TYPE: ICD-10-CM

## 2024-01-02 RX ORDER — ATORVASTATIN CALCIUM 80 MG/1
80 TABLET, FILM COATED ORAL DAILY
Qty: 90 TABLET | Refills: 0 | OUTPATIENT
Start: 2024-01-02

## 2024-01-08 ENCOUNTER — CLINICAL DOCUMENTATION (OUTPATIENT)
Dept: ORTHOPEDIC SURGERY | Age: 69
End: 2024-01-08

## 2024-01-29 ENCOUNTER — TELEMEDICINE (OUTPATIENT)
Dept: BEHAVIORAL/MENTAL HEALTH CLINIC | Age: 69
End: 2024-01-29
Payer: MEDICARE

## 2024-01-29 DIAGNOSIS — F33.42 RECURRENT MAJOR DEPRESSIVE DISORDER, IN FULL REMISSION (HCC): Primary | ICD-10-CM

## 2024-01-29 DIAGNOSIS — F51.01 PRIMARY INSOMNIA: ICD-10-CM

## 2024-01-29 DIAGNOSIS — F41.1 GENERALIZED ANXIETY DISORDER: ICD-10-CM

## 2024-01-29 PROCEDURE — 99214 OFFICE O/P EST MOD 30 MIN: CPT | Performed by: PSYCHIATRY & NEUROLOGY

## 2024-01-29 PROCEDURE — 3017F COLORECTAL CA SCREEN DOC REV: CPT | Performed by: PSYCHIATRY & NEUROLOGY

## 2024-01-29 PROCEDURE — 1090F PRES/ABSN URINE INCON ASSESS: CPT | Performed by: PSYCHIATRY & NEUROLOGY

## 2024-01-29 PROCEDURE — G8399 PT W/DXA RESULTS DOCUMENT: HCPCS | Performed by: PSYCHIATRY & NEUROLOGY

## 2024-01-29 PROCEDURE — G8427 DOCREV CUR MEDS BY ELIG CLIN: HCPCS | Performed by: PSYCHIATRY & NEUROLOGY

## 2024-01-29 PROCEDURE — 1123F ACP DISCUSS/DSCN MKR DOCD: CPT | Performed by: PSYCHIATRY & NEUROLOGY

## 2024-01-29 RX ORDER — ALPRAZOLAM 0.25 MG/1
0.25 TABLET ORAL 3 TIMES DAILY PRN
Qty: 60 TABLET | Refills: 3 | Status: SHIPPED | OUTPATIENT
Start: 2024-01-29 | End: 2024-05-28

## 2024-01-29 RX ORDER — VENLAFAXINE HYDROCHLORIDE 150 MG/1
150 CAPSULE, EXTENDED RELEASE ORAL DAILY
Qty: 30 CAPSULE | Refills: 3 | Status: SHIPPED | OUTPATIENT
Start: 2024-01-29

## 2024-01-29 RX ORDER — ZOLPIDEM TARTRATE 10 MG/1
10 TABLET ORAL NIGHTLY PRN
Qty: 30 TABLET | Refills: 3 | Status: SHIPPED | OUTPATIENT
Start: 2024-01-29 | End: 2024-05-28

## 2024-01-29 ASSESSMENT — ANXIETY QUESTIONNAIRES
7. FEELING AFRAID AS IF SOMETHING AWFUL MIGHT HAPPEN: 0
3. WORRYING TOO MUCH ABOUT DIFFERENT THINGS: 1
5. BEING SO RESTLESS THAT IT IS HARD TO SIT STILL: 1
IF YOU CHECKED OFF ANY PROBLEMS ON THIS QUESTIONNAIRE, HOW DIFFICULT HAVE THESE PROBLEMS MADE IT FOR YOU TO DO YOUR WORK, TAKE CARE OF THINGS AT HOME, OR GET ALONG WITH OTHER PEOPLE: SOMEWHAT DIFFICULT
4. TROUBLE RELAXING: 1
6. BECOMING EASILY ANNOYED OR IRRITABLE: 3
2. NOT BEING ABLE TO STOP OR CONTROL WORRYING: 1
1. FEELING NERVOUS, ANXIOUS, OR ON EDGE: 1
GAD7 TOTAL SCORE: 8

## 2024-01-29 ASSESSMENT — PATIENT HEALTH QUESTIONNAIRE - PHQ9
1. LITTLE INTEREST OR PLEASURE IN DOING THINGS: 0
2. FEELING DOWN, DEPRESSED OR HOPELESS: 0
SUM OF ALL RESPONSES TO PHQ QUESTIONS 1-9: 1
9. THOUGHTS THAT YOU WOULD BE BETTER OFF DEAD, OR OF HURTING YOURSELF: 0
6. FEELING BAD ABOUT YOURSELF - OR THAT YOU ARE A FAILURE OR HAVE LET YOURSELF OR YOUR FAMILY DOWN: 0
SUM OF ALL RESPONSES TO PHQ QUESTIONS 1-9: 1
5. POOR APPETITE OR OVEREATING: 0
SUM OF ALL RESPONSES TO PHQ QUESTIONS 1-9: 1
7. TROUBLE CONCENTRATING ON THINGS, SUCH AS READING THE NEWSPAPER OR WATCHING TELEVISION: 0
8. MOVING OR SPEAKING SO SLOWLY THAT OTHER PEOPLE COULD HAVE NOTICED. OR THE OPPOSITE, BEING SO FIGETY OR RESTLESS THAT YOU HAVE BEEN MOVING AROUND A LOT MORE THAN USUAL: 1
4. FEELING TIRED OR HAVING LITTLE ENERGY: 0
10. IF YOU CHECKED OFF ANY PROBLEMS, HOW DIFFICULT HAVE THESE PROBLEMS MADE IT FOR YOU TO DO YOUR WORK, TAKE CARE OF THINGS AT HOME, OR GET ALONG WITH OTHER PEOPLE: 1
SUM OF ALL RESPONSES TO PHQ QUESTIONS 1-9: 1
3. TROUBLE FALLING OR STAYING ASLEEP: 0
SUM OF ALL RESPONSES TO PHQ9 QUESTIONS 1 & 2: 0

## 2024-01-29 NOTE — PROGRESS NOTES
Patient:  Etta Bonilla  Age:  68 y.o.  :  1955     SEX:  female MRN:  [id number]     RACE: Black /      SEEN:  [x]  PATIENT  []  SPOUSE []  OTHER:                  2024     9:14 AM 10/25/2023    11:33 AM 2023    10:05 AM   PHQ-9    Little interest or pleasure in doing things 0 0 1   Feeling down, depressed, or hopeless 0 1 1   Trouble falling or staying asleep, or sleeping too much 0 0 0   Feeling tired or having little energy 0 0 3   Poor appetite or overeating 0 0 3   Feeling bad about yourself - or that you are a failure or have let yourself or your family down 0 0 0   Trouble concentrating on things, such as reading the newspaper or watching television 0 0 0   Moving or speaking so slowly that other people could have noticed. Or the opposite - being so fidgety or restless that you have been moving around a lot more than usual 1 0 1   Thoughts that you would be better off dead, or of hurting yourself in some way 0 0 0   PHQ-2 Score 0 1 2   PHQ-9 Total Score 1 1 9   If you checked off any problems, how difficult have these problems made it for you to do your work, take care of things at home, or get along with other people? 1 0 0           2024     9:15 AM 10/25/2023    11:34 AM 2023     8:12 AM   NIALL-7 SCREENING   Feeling nervous, anxious, or on edge Several days Not at all Not at all   Not being able to stop or control worrying Several days Several days Several days   Worrying too much about different things Several days Several days Several days   Trouble relaxing Several days Not at all Not at all   Being so restless that it is hard to sit still Several days Not at all Not at all   Becoming easily annoyed or irritable Nearly every day Several days Several days   Feeling afraid as if something awful might happen Not at all Several days Several days   NIALL-7 Total Score 8 4 4   How difficult have these problems made it for you to do your work, take

## 2024-02-21 DIAGNOSIS — J32.9 SINUSITIS, UNSPECIFIED CHRONICITY, UNSPECIFIED LOCATION: ICD-10-CM

## 2024-02-21 DIAGNOSIS — R42 VERTIGO: ICD-10-CM

## 2024-02-21 DIAGNOSIS — E78.5 HYPERLIPIDEMIA, UNSPECIFIED HYPERLIPIDEMIA TYPE: ICD-10-CM

## 2024-02-21 DIAGNOSIS — J02.9 PHARYNGITIS, UNSPECIFIED ETIOLOGY: ICD-10-CM

## 2024-02-21 RX ORDER — ATORVASTATIN CALCIUM 80 MG/1
80 TABLET, FILM COATED ORAL DAILY
Qty: 90 TABLET | Refills: 0 | OUTPATIENT
Start: 2024-02-21

## 2024-02-21 RX ORDER — MECLIZINE HYDROCHLORIDE 25 MG/1
25 TABLET ORAL DAILY
Qty: 30 TABLET | Refills: 1 | OUTPATIENT
Start: 2024-02-21

## 2024-02-21 RX ORDER — FLUTICASONE PROPIONATE 50 MCG
2 SPRAY, SUSPENSION (ML) NASAL DAILY
OUTPATIENT
Start: 2024-02-21

## 2024-04-04 ENCOUNTER — TELEMEDICINE (OUTPATIENT)
Dept: BEHAVIORAL/MENTAL HEALTH CLINIC | Age: 69
End: 2024-04-04
Payer: MEDICARE

## 2024-04-04 DIAGNOSIS — F41.1 GENERALIZED ANXIETY DISORDER: ICD-10-CM

## 2024-04-04 DIAGNOSIS — F51.01 PRIMARY INSOMNIA: ICD-10-CM

## 2024-04-04 DIAGNOSIS — F33.42 RECURRENT MAJOR DEPRESSIVE DISORDER, IN FULL REMISSION (HCC): Primary | ICD-10-CM

## 2024-04-04 PROCEDURE — G8399 PT W/DXA RESULTS DOCUMENT: HCPCS | Performed by: PSYCHIATRY & NEUROLOGY

## 2024-04-04 PROCEDURE — G8427 DOCREV CUR MEDS BY ELIG CLIN: HCPCS | Performed by: PSYCHIATRY & NEUROLOGY

## 2024-04-04 PROCEDURE — 1090F PRES/ABSN URINE INCON ASSESS: CPT | Performed by: PSYCHIATRY & NEUROLOGY

## 2024-04-04 PROCEDURE — 99214 OFFICE O/P EST MOD 30 MIN: CPT | Performed by: PSYCHIATRY & NEUROLOGY

## 2024-04-04 PROCEDURE — 3017F COLORECTAL CA SCREEN DOC REV: CPT | Performed by: PSYCHIATRY & NEUROLOGY

## 2024-04-04 PROCEDURE — 1123F ACP DISCUSS/DSCN MKR DOCD: CPT | Performed by: PSYCHIATRY & NEUROLOGY

## 2024-04-04 RX ORDER — ZOLPIDEM TARTRATE 10 MG/1
10 TABLET ORAL NIGHTLY PRN
Qty: 30 TABLET | Refills: 3 | Status: SHIPPED | OUTPATIENT
Start: 2024-04-04 | End: 2024-08-02

## 2024-04-04 RX ORDER — VENLAFAXINE HYDROCHLORIDE 150 MG/1
150 CAPSULE, EXTENDED RELEASE ORAL DAILY
Qty: 30 CAPSULE | Refills: 3 | Status: SHIPPED | OUTPATIENT
Start: 2024-04-04

## 2024-04-04 RX ORDER — ALPRAZOLAM 0.25 MG/1
0.25 TABLET ORAL 2 TIMES DAILY PRN
Qty: 60 TABLET | Refills: 3 | Status: SHIPPED | OUTPATIENT
Start: 2024-04-04 | End: 2024-08-02

## 2024-04-04 RX ORDER — PRIMIDONE 50 MG/1
TABLET ORAL
COMMUNITY
Start: 2024-03-19

## 2024-04-04 ASSESSMENT — PATIENT HEALTH QUESTIONNAIRE - PHQ9
SUM OF ALL RESPONSES TO PHQ QUESTIONS 1-9: 2
3. TROUBLE FALLING OR STAYING ASLEEP: NOT AT ALL
5. POOR APPETITE OR OVEREATING: NOT AT ALL
10. IF YOU CHECKED OFF ANY PROBLEMS, HOW DIFFICULT HAVE THESE PROBLEMS MADE IT FOR YOU TO DO YOUR WORK, TAKE CARE OF THINGS AT HOME, OR GET ALONG WITH OTHER PEOPLE: NOT DIFFICULT AT ALL
SUM OF ALL RESPONSES TO PHQ QUESTIONS 1-9: 2
9. THOUGHTS THAT YOU WOULD BE BETTER OFF DEAD, OR OF HURTING YOURSELF: NOT AT ALL
1. LITTLE INTEREST OR PLEASURE IN DOING THINGS: NOT AT ALL
SUM OF ALL RESPONSES TO PHQ9 QUESTIONS 1 & 2: 1
SUM OF ALL RESPONSES TO PHQ QUESTIONS 1-9: 2
2. FEELING DOWN, DEPRESSED OR HOPELESS: SEVERAL DAYS
7. TROUBLE CONCENTRATING ON THINGS, SUCH AS READING THE NEWSPAPER OR WATCHING TELEVISION: NOT AT ALL
8. MOVING OR SPEAKING SO SLOWLY THAT OTHER PEOPLE COULD HAVE NOTICED. OR THE OPPOSITE, BEING SO FIGETY OR RESTLESS THAT YOU HAVE BEEN MOVING AROUND A LOT MORE THAN USUAL: NOT AT ALL
SUM OF ALL RESPONSES TO PHQ QUESTIONS 1-9: 2
4. FEELING TIRED OR HAVING LITTLE ENERGY: SEVERAL DAYS
6. FEELING BAD ABOUT YOURSELF - OR THAT YOU ARE A FAILURE OR HAVE LET YOURSELF OR YOUR FAMILY DOWN: NOT AT ALL

## 2024-04-04 ASSESSMENT — ANXIETY QUESTIONNAIRES
IF YOU CHECKED OFF ANY PROBLEMS ON THIS QUESTIONNAIRE, HOW DIFFICULT HAVE THESE PROBLEMS MADE IT FOR YOU TO DO YOUR WORK, TAKE CARE OF THINGS AT HOME, OR GET ALONG WITH OTHER PEOPLE: NOT DIFFICULT AT ALL
4. TROUBLE RELAXING: SEVERAL DAYS
2. NOT BEING ABLE TO STOP OR CONTROL WORRYING: NOT AT ALL
6. BECOMING EASILY ANNOYED OR IRRITABLE: NOT AT ALL
7. FEELING AFRAID AS IF SOMETHING AWFUL MIGHT HAPPEN: NOT AT ALL
5. BEING SO RESTLESS THAT IT IS HARD TO SIT STILL: NOT AT ALL
1. FEELING NERVOUS, ANXIOUS, OR ON EDGE: SEVERAL DAYS
GAD7 TOTAL SCORE: 3
3. WORRYING TOO MUCH ABOUT DIFFERENT THINGS: SEVERAL DAYS

## 2024-04-04 NOTE — PROGRESS NOTES
NORMAL FOR CLIENT   [] SPONTANEOUS   [] SLURRED   [] WHISPERING      [] LOUD   [] PRESSURED   [] ARTICULATE        EYE CONTACT:   [x] WNL   [] BLANK STARE   [] INTENSE      [] AVOIDANT         MOOD:   [x] EUTHYMIC   [] ANXIOUS   [] DEPRESSED      [] IRRITABLE   [] ANGRY   [] APATHETIC     AFFECT:   [x] CONGRUENT WITH MOOD   [] FLAT   [] CONSTRICTED      [] INAPPROPRIATE   [] LABILE           THOUGHT PROCESS:   [x] LOGICAL/GOAL-DIRECTED   [] FOI   [] CIRCUMSANTIAL      [] INCOHERENT   [] TANGENTIAL   [] CONCRETE      [] PERSEVERATION           THOUGHT CONTENT:                DELUSIONS  [x] DENIES  [] GRANDIOSE  [] PERSECUTORY  [] Mormonism  [] REFERENCE   HALLUCINATIONS  [x] DENIES  [] AUDITORY  [] VISUAL  [] OLFACTORY  [] TACTILE     [] GUSTATORY  [] SOMATIC         OBSESSIONS  [x] DENIES  [] PRESENT         SUICIDAL IDEATION  [x] DENIES  [] PRESENT W/O PLAN  [] PRESENT W/ PLAN       HOMICIDAL IDEATION  [x] DENIES  [] PRESENT W/O PLAN  [] PRESENT W/ PLAN           JUDGEMENT:   [x] GOOD   [] FAIR   [] POOR   INSIGHT:   [x] GOOD   [] FAIR   [] POOR     COGNITION:           SENSORIUM:   [x] ALERT   [] CLOUDED   [] DROWSY     ORIENTATION:   [x] INTACT   [] TIME:  PLACE  PERSON   RECENT & REMOTE MEMORY:   [x] NORMAL   [] OTHER:                  ATTENTION:   [x] INTACT   [] MILD IMPAIRMENT   [] SEVERE IMPAIRMENT     CONCENTRATION:   [x] INTACT   [] MILD IMPAIRMENT   [] SEVERE IMPAIRMENT     LANGUAGE:   [x] AVERAGE   [] ABOVE AVERAGE   [] BELOW AVERAGE     FUND OF KNOWLEDGE:   [] UNABLE TO ASSESS AT THIS TIME   [x] AVERAGE   [] ABOVE AVERAGE   [] BELOW AVERAGE      [] GOOD TO EXCELLENT KNOWLEDGE OF CURRENT EVENTS   [] POOR TO NO KNLEDGE OF CURRENT EVENTS                                          ABNORMAL MOVEMENTS:   [x] NONE   [] TICS   [] TREMORS   [] BIZZARE      [] FACE   [] TRUNK   [] EXTREMETIES   [] GESTURES        SLEEP:   [x] GOOD   [] FAIR   [] POOR      APPETITE:   [x] GOOD   [] POOR   [] ERRATIC       MUSCLE

## 2024-05-11 DIAGNOSIS — M81.0 OSTEOPOROSIS, UNSPECIFIED OSTEOPOROSIS TYPE, UNSPECIFIED PATHOLOGICAL FRACTURE PRESENCE: ICD-10-CM

## 2024-05-13 RX ORDER — ALENDRONATE SODIUM 70 MG/1
70 TABLET ORAL
Qty: 12 TABLET | OUTPATIENT
Start: 2024-05-13

## 2024-05-26 DIAGNOSIS — J02.9 PHARYNGITIS, UNSPECIFIED ETIOLOGY: ICD-10-CM

## 2024-05-26 DIAGNOSIS — R42 VERTIGO: ICD-10-CM

## 2024-05-26 DIAGNOSIS — M54.2 CERVICALGIA: ICD-10-CM

## 2024-05-26 DIAGNOSIS — M50.30 DDD (DEGENERATIVE DISC DISEASE), CERVICAL: ICD-10-CM

## 2024-05-26 DIAGNOSIS — M81.0 OSTEOPOROSIS, UNSPECIFIED OSTEOPOROSIS TYPE, UNSPECIFIED PATHOLOGICAL FRACTURE PRESENCE: ICD-10-CM

## 2024-05-26 DIAGNOSIS — J32.9 SINUSITIS, UNSPECIFIED CHRONICITY, UNSPECIFIED LOCATION: ICD-10-CM

## 2024-05-28 RX ORDER — MECLIZINE HYDROCHLORIDE 25 MG/1
25 TABLET ORAL DAILY
Qty: 30 TABLET | Refills: 1 | OUTPATIENT
Start: 2024-05-28

## 2024-05-28 RX ORDER — AZITHROMYCIN 500 MG/1
500 TABLET, FILM COATED ORAL DAILY
Qty: 5 TABLET | Refills: 0 | OUTPATIENT
Start: 2024-05-28 | End: 2024-06-02

## 2024-05-28 RX ORDER — NAPROXEN 375 MG/1
375 TABLET ORAL 2 TIMES DAILY WITH MEALS
Qty: 20 TABLET | Refills: 0 | OUTPATIENT
Start: 2024-05-28

## 2024-05-28 RX ORDER — AMOXICILLIN 875 MG/1
875 TABLET, COATED ORAL 2 TIMES DAILY
Qty: 20 TABLET | Refills: 0 | OUTPATIENT
Start: 2024-05-28 | End: 2024-06-07

## 2024-05-28 RX ORDER — ALENDRONATE SODIUM 70 MG/1
70 TABLET ORAL
Qty: 12 TABLET | OUTPATIENT
Start: 2024-05-28

## 2024-05-28 RX ORDER — FLUTICASONE PROPIONATE 50 MCG
2 SPRAY, SUSPENSION (ML) NASAL DAILY
OUTPATIENT
Start: 2024-05-28

## 2024-05-28 RX ORDER — PREDNISONE 20 MG/1
40 TABLET ORAL DAILY
Qty: 10 TABLET | Refills: 0 | OUTPATIENT
Start: 2024-05-28 | End: 2024-06-02

## 2024-05-28 RX ORDER — MECLIZINE HYDROCHLORIDE 25 MG/1
TABLET ORAL
Qty: 30 TABLET | Refills: 1 | OUTPATIENT
Start: 2024-05-28

## 2024-07-08 ENCOUNTER — TELEMEDICINE (OUTPATIENT)
Dept: BEHAVIORAL/MENTAL HEALTH CLINIC | Age: 69
End: 2024-07-08
Payer: MEDICARE

## 2024-07-08 DIAGNOSIS — F33.42 RECURRENT MAJOR DEPRESSIVE DISORDER, IN FULL REMISSION (HCC): Primary | ICD-10-CM

## 2024-07-08 DIAGNOSIS — F41.1 GENERALIZED ANXIETY DISORDER: ICD-10-CM

## 2024-07-08 DIAGNOSIS — F51.01 PRIMARY INSOMNIA: ICD-10-CM

## 2024-07-08 PROCEDURE — 99214 OFFICE O/P EST MOD 30 MIN: CPT | Performed by: PSYCHIATRY & NEUROLOGY

## 2024-07-08 PROCEDURE — 3017F COLORECTAL CA SCREEN DOC REV: CPT | Performed by: PSYCHIATRY & NEUROLOGY

## 2024-07-08 PROCEDURE — G8399 PT W/DXA RESULTS DOCUMENT: HCPCS | Performed by: PSYCHIATRY & NEUROLOGY

## 2024-07-08 PROCEDURE — G8427 DOCREV CUR MEDS BY ELIG CLIN: HCPCS | Performed by: PSYCHIATRY & NEUROLOGY

## 2024-07-08 PROCEDURE — 1123F ACP DISCUSS/DSCN MKR DOCD: CPT | Performed by: PSYCHIATRY & NEUROLOGY

## 2024-07-08 PROCEDURE — 1090F PRES/ABSN URINE INCON ASSESS: CPT | Performed by: PSYCHIATRY & NEUROLOGY

## 2024-07-08 RX ORDER — ALPRAZOLAM 0.25 MG/1
0.25 TABLET ORAL 2 TIMES DAILY PRN
Qty: 60 TABLET | Refills: 3 | Status: SHIPPED | OUTPATIENT
Start: 2024-07-08 | End: 2024-11-05

## 2024-07-08 RX ORDER — VENLAFAXINE HYDROCHLORIDE 150 MG/1
150 CAPSULE, EXTENDED RELEASE ORAL DAILY
Qty: 30 CAPSULE | Refills: 3 | Status: SHIPPED | OUTPATIENT
Start: 2024-07-08

## 2024-07-08 RX ORDER — ZOLPIDEM TARTRATE 10 MG/1
10 TABLET ORAL NIGHTLY PRN
Qty: 30 TABLET | Refills: 3 | Status: SHIPPED | OUTPATIENT
Start: 2024-07-08 | End: 2024-11-05

## 2024-07-08 ASSESSMENT — PATIENT HEALTH QUESTIONNAIRE - PHQ9
SUM OF ALL RESPONSES TO PHQ QUESTIONS 1-9: 0
SUM OF ALL RESPONSES TO PHQ9 QUESTIONS 1 & 2: 0
8. MOVING OR SPEAKING SO SLOWLY THAT OTHER PEOPLE COULD HAVE NOTICED. OR THE OPPOSITE, BEING SO FIGETY OR RESTLESS THAT YOU HAVE BEEN MOVING AROUND A LOT MORE THAN USUAL: NOT AT ALL
5. POOR APPETITE OR OVEREATING: NOT AT ALL
3. TROUBLE FALLING OR STAYING ASLEEP: NOT AT ALL
6. FEELING BAD ABOUT YOURSELF - OR THAT YOU ARE A FAILURE OR HAVE LET YOURSELF OR YOUR FAMILY DOWN: NOT AT ALL
SUM OF ALL RESPONSES TO PHQ QUESTIONS 1-9: 0
SUM OF ALL RESPONSES TO PHQ QUESTIONS 1-9: 0
4. FEELING TIRED OR HAVING LITTLE ENERGY: NOT AT ALL
1. LITTLE INTEREST OR PLEASURE IN DOING THINGS: NOT AT ALL
2. FEELING DOWN, DEPRESSED OR HOPELESS: NOT AT ALL
7. TROUBLE CONCENTRATING ON THINGS, SUCH AS READING THE NEWSPAPER OR WATCHING TELEVISION: NOT AT ALL
10. IF YOU CHECKED OFF ANY PROBLEMS, HOW DIFFICULT HAVE THESE PROBLEMS MADE IT FOR YOU TO DO YOUR WORK, TAKE CARE OF THINGS AT HOME, OR GET ALONG WITH OTHER PEOPLE: NOT DIFFICULT AT ALL
SUM OF ALL RESPONSES TO PHQ QUESTIONS 1-9: 0
9. THOUGHTS THAT YOU WOULD BE BETTER OFF DEAD, OR OF HURTING YOURSELF: NOT AT ALL

## 2024-07-08 ASSESSMENT — ANXIETY QUESTIONNAIRES
7. FEELING AFRAID AS IF SOMETHING AWFUL MIGHT HAPPEN: NOT AT ALL
4. TROUBLE RELAXING: NOT AT ALL
2. NOT BEING ABLE TO STOP OR CONTROL WORRYING: NOT AT ALL
GAD7 TOTAL SCORE: 1
5. BEING SO RESTLESS THAT IT IS HARD TO SIT STILL: NOT AT ALL
6. BECOMING EASILY ANNOYED OR IRRITABLE: SEVERAL DAYS
IF YOU CHECKED OFF ANY PROBLEMS ON THIS QUESTIONNAIRE, HOW DIFFICULT HAVE THESE PROBLEMS MADE IT FOR YOU TO DO YOUR WORK, TAKE CARE OF THINGS AT HOME, OR GET ALONG WITH OTHER PEOPLE: NOT DIFFICULT AT ALL
1. FEELING NERVOUS, ANXIOUS, OR ON EDGE: NOT AT ALL
3. WORRYING TOO MUCH ABOUT DIFFERENT THINGS: NOT AT ALL

## 2024-07-08 NOTE — PROGRESS NOTES
Patient:  Etta Bonilla  Age:  68 y.o.  :  1955     SEX:  female MRN:  [id number]     RACE: Black /      SEEN:  [x]  PATIENT  []  SPOUSE []  OTHER:                  2024    10:43 AM 2024     1:08 PM 2024     9:14 AM   PHQ-9    Little interest or pleasure in doing things 0 0 0   Feeling down, depressed, or hopeless 0 1 0   Trouble falling or staying asleep, or sleeping too much 0 0 0   Feeling tired or having little energy 0 1 0   Poor appetite or overeating 0 0 0   Feeling bad about yourself - or that you are a failure or have let yourself or your family down 0 0 0   Trouble concentrating on things, such as reading the newspaper or watching television 0 0 0   Moving or speaking so slowly that other people could have noticed. Or the opposite - being so fidgety or restless that you have been moving around a lot more than usual 0 0 1   Thoughts that you would be better off dead, or of hurting yourself in some way 0 0 0   PHQ-2 Score 0 1 0   PHQ-9 Total Score 0 2 1   If you checked off any problems, how difficult have these problems made it for you to do your work, take care of things at home, or get along with other people? 0 0 1           2024    10:44 AM 2024     1:09 PM 2024     9:15 AM   NIALL-7 SCREENING   Feeling nervous, anxious, or on edge Not at all Several days Several days   Not being able to stop or control worrying Not at all Not at all Several days   Worrying too much about different things Not at all Several days Several days   Trouble relaxing Not at all Several days Several days   Being so restless that it is hard to sit still Not at all Not at all Several days   Becoming easily annoyed or irritable Several days Not at all Nearly every day   Feeling afraid as if something awful might happen Not at all Not at all Not at all   NIALL-7 Total Score 1 3 8   How difficult have these problems made it for you to do your work, take care of things at

## 2024-09-20 ENCOUNTER — TELEMEDICINE (OUTPATIENT)
Dept: BEHAVIORAL/MENTAL HEALTH CLINIC | Age: 69
End: 2024-09-20

## 2024-09-20 DIAGNOSIS — F33.42 RECURRENT MAJOR DEPRESSIVE DISORDER, IN FULL REMISSION (HCC): Primary | ICD-10-CM

## 2024-09-20 DIAGNOSIS — F51.01 PRIMARY INSOMNIA: ICD-10-CM

## 2024-09-20 DIAGNOSIS — F41.1 GENERALIZED ANXIETY DISORDER: ICD-10-CM

## 2024-09-20 RX ORDER — ZOLPIDEM TARTRATE 10 MG/1
10 TABLET ORAL NIGHTLY PRN
Qty: 30 TABLET | Refills: 3 | Status: SHIPPED | OUTPATIENT
Start: 2024-09-20 | End: 2025-01-18

## 2024-09-20 RX ORDER — ALPRAZOLAM 0.25 MG
0.25 TABLET ORAL DAILY PRN
Qty: 30 TABLET | Refills: 3 | Status: SHIPPED | OUTPATIENT
Start: 2024-09-20 | End: 2025-01-18

## 2024-09-20 RX ORDER — VENLAFAXINE HYDROCHLORIDE 150 MG/1
150 CAPSULE, EXTENDED RELEASE ORAL DAILY
Qty: 30 CAPSULE | Refills: 3 | Status: SHIPPED | OUTPATIENT
Start: 2024-09-20

## 2024-09-20 ASSESSMENT — ANXIETY QUESTIONNAIRES
2. NOT BEING ABLE TO STOP OR CONTROL WORRYING: SEVERAL DAYS
IF YOU CHECKED OFF ANY PROBLEMS ON THIS QUESTIONNAIRE, HOW DIFFICULT HAVE THESE PROBLEMS MADE IT FOR YOU TO DO YOUR WORK, TAKE CARE OF THINGS AT HOME, OR GET ALONG WITH OTHER PEOPLE: NOT DIFFICULT AT ALL
4. TROUBLE RELAXING: NOT AT ALL
1. FEELING NERVOUS, ANXIOUS, OR ON EDGE: NOT AT ALL
6. BECOMING EASILY ANNOYED OR IRRITABLE: NOT AT ALL
GAD7 TOTAL SCORE: 1
3. WORRYING TOO MUCH ABOUT DIFFERENT THINGS: NOT AT ALL
5. BEING SO RESTLESS THAT IT IS HARD TO SIT STILL: NOT AT ALL
7. FEELING AFRAID AS IF SOMETHING AWFUL MIGHT HAPPEN: NOT AT ALL

## 2024-09-20 ASSESSMENT — PATIENT HEALTH QUESTIONNAIRE - PHQ9
3. TROUBLE FALLING OR STAYING ASLEEP: SEVERAL DAYS
SUM OF ALL RESPONSES TO PHQ QUESTIONS 1-9: 2
4. FEELING TIRED OR HAVING LITTLE ENERGY: SEVERAL DAYS
SUM OF ALL RESPONSES TO PHQ9 QUESTIONS 1 & 2: 0
10. IF YOU CHECKED OFF ANY PROBLEMS, HOW DIFFICULT HAVE THESE PROBLEMS MADE IT FOR YOU TO DO YOUR WORK, TAKE CARE OF THINGS AT HOME, OR GET ALONG WITH OTHER PEOPLE: NOT DIFFICULT AT ALL
SUM OF ALL RESPONSES TO PHQ QUESTIONS 1-9: 2
6. FEELING BAD ABOUT YOURSELF - OR THAT YOU ARE A FAILURE OR HAVE LET YOURSELF OR YOUR FAMILY DOWN: NOT AT ALL
8. MOVING OR SPEAKING SO SLOWLY THAT OTHER PEOPLE COULD HAVE NOTICED. OR THE OPPOSITE, BEING SO FIGETY OR RESTLESS THAT YOU HAVE BEEN MOVING AROUND A LOT MORE THAN USUAL: NOT AT ALL
2. FEELING DOWN, DEPRESSED OR HOPELESS: NOT AT ALL
SUM OF ALL RESPONSES TO PHQ QUESTIONS 1-9: 2
SUM OF ALL RESPONSES TO PHQ QUESTIONS 1-9: 2
7. TROUBLE CONCENTRATING ON THINGS, SUCH AS READING THE NEWSPAPER OR WATCHING TELEVISION: NOT AT ALL
5. POOR APPETITE OR OVEREATING: NOT AT ALL
9. THOUGHTS THAT YOU WOULD BE BETTER OFF DEAD, OR OF HURTING YOURSELF: NOT AT ALL
1. LITTLE INTEREST OR PLEASURE IN DOING THINGS: NOT AT ALL

## 2024-09-30 ENCOUNTER — TELEPHONE (OUTPATIENT)
Dept: ORTHOPEDIC SURGERY | Age: 69
End: 2024-09-30

## 2024-09-30 NOTE — TELEPHONE ENCOUNTER
Spoke with pt to see if she wants to keep appointment with Dr. Damian since she saw Dr. Boyd a few weeks ago. Pt asked to cancel appointment.

## 2024-11-01 ENCOUNTER — HOSPITAL ENCOUNTER (OUTPATIENT)
Dept: MAMMOGRAPHY | Age: 69
Discharge: HOME | End: 2024-11-04
Attending: FAMILY MEDICINE
Payer: MEDICARE

## 2024-11-01 ENCOUNTER — HOSPITAL ENCOUNTER (OUTPATIENT)
Dept: MAMMOGRAPHY | Age: 69
Discharge: HOME | End: 2024-11-04
Payer: MEDICARE

## 2024-11-01 DIAGNOSIS — M81.0 OSTEOPOROSIS, UNSPECIFIED OSTEOPOROSIS TYPE, UNSPECIFIED PATHOLOGICAL FRACTURE PRESENCE: ICD-10-CM

## 2024-11-01 DIAGNOSIS — Z12.31 SCREENING MAMMOGRAM FOR BREAST CANCER: ICD-10-CM

## 2024-11-01 PROCEDURE — 77080 DXA BONE DENSITY AXIAL: CPT

## 2024-11-01 PROCEDURE — 77067 SCR MAMMO BI INCL CAD: CPT

## 2024-11-01 PROCEDURE — 77063 BREAST TOMOSYNTHESIS BI: CPT

## 2024-11-20 RX ORDER — VENLAFAXINE HYDROCHLORIDE 150 MG/1
CAPSULE, EXTENDED RELEASE ORAL DAILY
Qty: 90 CAPSULE | Refills: 2 | OUTPATIENT
Start: 2024-11-20

## 2024-11-26 ENCOUNTER — TELEPHONE (OUTPATIENT)
Dept: BEHAVIORAL/MENTAL HEALTH CLINIC | Age: 69
End: 2024-11-26

## 2024-11-26 NOTE — TELEPHONE ENCOUNTER
Spoke with patient and she stated her ex , her children's father passed away 10/14. Her son is also having marital problems. Stated that she is having difficulty in dealing with the situations. Stated that she has already taken all of the Xanax that she had filled. Would like something else to help with this for a little while.

## 2024-11-26 NOTE — TELEPHONE ENCOUNTER
Patient called requesting a higher dose for xanax for today,please contact patient.    [facility]    Thank you

## 2024-11-27 DIAGNOSIS — F41.1 GENERALIZED ANXIETY DISORDER: ICD-10-CM

## 2024-11-27 RX ORDER — ALPRAZOLAM 0.25 MG/1
0.25 TABLET ORAL DAILY PRN
Qty: 22 TABLET | Refills: 0 | Status: SHIPPED | OUTPATIENT
Start: 2024-11-27 | End: 2025-03-27

## 2025-01-09 NOTE — INTERVAL H&P NOTE
Form received and signed by Dr. Haq. Returned to [facility] at [phone].   H&P Update:  Sheila Gan was seen and examined. History and physical has been reviewed. The patient has been examined.  There have been no significant clinical changes since the completion of the originally dated History and Physical.    Signed By: Evelyne Kim MD     April 17, 2018 7:19 AM

## 2025-01-23 RX ORDER — VENLAFAXINE HYDROCHLORIDE 150 MG/1
CAPSULE, EXTENDED RELEASE ORAL DAILY
Qty: 30 CAPSULE | Refills: 3 | OUTPATIENT
Start: 2025-01-23

## 2025-02-05 DIAGNOSIS — M81.0 OSTEOPOROSIS, UNSPECIFIED OSTEOPOROSIS TYPE, UNSPECIFIED PATHOLOGICAL FRACTURE PRESENCE: ICD-10-CM

## 2025-02-05 RX ORDER — ALENDRONATE SODIUM 70 MG/1
70 TABLET ORAL
Qty: 12 TABLET | OUTPATIENT
Start: 2025-02-05

## 2025-03-17 RX ORDER — VENLAFAXINE HYDROCHLORIDE 150 MG/1
CAPSULE, EXTENDED RELEASE ORAL DAILY
Qty: 30 CAPSULE | Refills: 3 | OUTPATIENT
Start: 2025-03-17

## 2025-06-04 RX ORDER — VENLAFAXINE HYDROCHLORIDE 150 MG/1
CAPSULE, EXTENDED RELEASE ORAL DAILY
Qty: 30 CAPSULE | Refills: 3 | OUTPATIENT
Start: 2025-06-04

## 2025-07-29 PROBLEM — F50.89 PATHOLOGIC ICE EATING: Status: RESOLVED | Noted: 2022-07-18 | Resolved: 2025-07-29

## 2025-07-29 PROBLEM — Z86.711 HISTORY OF PULMONARY EMBOLISM: Status: RESOLVED | Noted: 2022-07-18 | Resolved: 2025-07-29

## 2025-07-29 PROBLEM — Z71.3 DIETARY COUNSELING: Status: RESOLVED | Noted: 2022-07-18 | Resolved: 2025-07-29

## 2025-07-29 PROBLEM — Z71.3 DIETARY COUNSELING AND SURVEILLANCE: Status: RESOLVED | Noted: 2020-01-09 | Resolved: 2025-07-29

## 2025-07-29 PROBLEM — S42.251A CLOSED FRACTURE OF GREATER TUBEROSITY OF RIGHT HUMERUS: Status: RESOLVED | Noted: 2018-08-01 | Resolved: 2025-07-29

## 2025-07-29 PROBLEM — J40 BRONCHITIS: Status: RESOLVED | Noted: 2022-10-13 | Resolved: 2025-07-29

## 2025-07-29 PROBLEM — M54.40 ACUTE RIGHT-SIDED LOW BACK PAIN WITH SCIATICA: Status: RESOLVED | Noted: 2020-01-09 | Resolved: 2025-07-29

## 2025-07-29 PROBLEM — S80.01XA CONTUSION OF RIGHT KNEE: Status: RESOLVED | Noted: 2023-02-13 | Resolved: 2025-07-29

## 2025-07-29 PROBLEM — J32.9 SINUSITIS: Status: RESOLVED | Noted: 2021-04-14 | Resolved: 2025-07-29

## 2025-07-29 PROBLEM — M81.0 OSTEOPOROSIS: Status: RESOLVED | Noted: 2022-09-21 | Resolved: 2025-07-29

## 2025-07-29 PROBLEM — M79.602 LEFT ARM PAIN: Status: RESOLVED | Noted: 2023-08-23 | Resolved: 2025-07-29

## 2025-07-29 PROBLEM — Z01.818 PREOPERATIVE CLEARANCE: Status: RESOLVED | Noted: 2021-07-30 | Resolved: 2025-07-29

## 2025-07-29 PROBLEM — M54.2 NECK PAIN ON LEFT SIDE: Status: RESOLVED | Noted: 2023-08-23 | Resolved: 2025-07-29

## 2025-07-29 PROBLEM — J02.9 PHARYNGITIS: Status: RESOLVED | Noted: 2022-10-02 | Resolved: 2025-07-29

## 2025-07-29 PROBLEM — J30.1 ACUTE SEASONAL ALLERGIC RHINITIS DUE TO POLLEN: Status: RESOLVED | Noted: 2019-05-06 | Resolved: 2025-07-29

## 2025-07-29 PROBLEM — J20.9 ACUTE BRONCHITIS: Status: RESOLVED | Noted: 2023-01-09 | Resolved: 2025-07-29

## 2025-07-29 PROBLEM — M19.011 OSTEOARTHRITIS OF RIGHT ACROMIOCLAVICULAR JOINT: Status: RESOLVED | Noted: 2018-07-24 | Resolved: 2025-07-29

## 2025-07-29 PROBLEM — E78.5 HYPERLIPIDEMIA: Status: RESOLVED | Noted: 2019-05-06 | Resolved: 2025-07-29

## 2025-07-29 PROBLEM — K76.9 LESION OF LIVER: Status: RESOLVED | Noted: 2018-10-29 | Resolved: 2025-07-29

## 2025-07-29 PROBLEM — M75.101 TEAR OF RIGHT ROTATOR CUFF: Status: RESOLVED | Noted: 2018-07-24 | Resolved: 2025-07-29

## 2025-07-29 PROBLEM — Z12.31 SCREENING MAMMOGRAM FOR BREAST CANCER: Status: RESOLVED | Noted: 2022-10-02 | Resolved: 2025-07-29

## 2025-07-29 PROBLEM — K21.9 GASTROESOPHAGEAL REFLUX DISEASE WITHOUT ESOPHAGITIS: Status: RESOLVED | Noted: 2022-07-19 | Resolved: 2025-07-29

## 2025-07-29 PROBLEM — E66.9 OBESITY, UNSPECIFIED OBESITY SEVERITY, UNSPECIFIED OBESITY TYPE: Status: RESOLVED | Noted: 2022-07-18 | Resolved: 2025-07-29

## 2025-07-29 PROBLEM — G47.33 OBSTRUCTIVE SLEEP APNEA (ADULT) (PEDIATRIC): Status: RESOLVED | Noted: 2023-01-09 | Resolved: 2025-07-29

## 2025-07-29 PROBLEM — S40.012A CONTUSION OF LEFT SHOULDER: Status: RESOLVED | Noted: 2020-09-04 | Resolved: 2025-07-29
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
## 2025-07-29 PROBLEM — Z71.89 ACP (ADVANCE CARE PLANNING): Status: RESOLVED | Noted: 2019-05-06 | Resolved: 2025-07-29

## 2025-08-04 RX ORDER — VENLAFAXINE HYDROCHLORIDE 150 MG/1
CAPSULE, EXTENDED RELEASE ORAL DAILY
Qty: 30 CAPSULE | Refills: 3 | OUTPATIENT
Start: 2025-08-04

## (undated) DEVICE — SYRINGE MED 3ML CLR PLAS STD N CTRL LUERLOCK TIP DISP

## (undated) DEVICE — UNIVERSAL FIXATION CANNULA: Brand: VERSAONE

## (undated) DEVICE — CANNULA NSL ORAL AD FOR CAPNOFLEX CO2 O2 AIRLFE

## (undated) DEVICE — SUTURE MCRYL SZ 3-0 L27IN ABSRB UD L24MM PS-1 3/8 CIR PRIM Y936H

## (undated) DEVICE — SYRINGE, LUER SLIP, STERILE, 60ML: Brand: MEDLINE

## (undated) DEVICE — REM POLYHESIVE ADULT PATIENT RETURN ELECTRODE: Brand: VALLEYLAB

## (undated) DEVICE — SOL ANTI-FOG 6ML MEDC -- MEDICHOICE - CONVERT TO 358427

## (undated) DEVICE — VISIGI 3D®  CALIBRATION SYSTEM  SIZE 40FR SLEEVE/STD: Brand: BOEHRINGER® VISIGI™ 3D SLEEVE GASTRECTOMY CALIBRATION SYSTEM, SIZE 40FR

## (undated) DEVICE — KENDALL RADIOLUCENT FOAM MONITORING ELECTRODE RECTANGULAR SHAPE: Brand: KENDALL

## (undated) DEVICE — GAUZE,SPONGE,4"X4",12PLY,WOVEN,NS,LF: Brand: MEDLINE

## (undated) DEVICE — LUBE JELLY FOIL PACK 1.4 OZ: Brand: MEDLINE INDUSTRIES, INC.

## (undated) DEVICE — RELOAD STPL L60MM H2.3-4.2MM VERY THCK TISS BLK 6 ROW

## (undated) DEVICE — RELOAD STPL L60MM H1.5-3.6MM REG TISS BLU GRIPPING SURF B

## (undated) DEVICE — SYRINGE MED 10ML LUERLOCK TIP W/O SFTY DISP

## (undated) DEVICE — STAPLER SKIN L440MM 32MM LNG 12 FIRING B FRM PWR + GRIPPING

## (undated) DEVICE — NEEDLE SYR 18GA L1.5IN RED PLAS HUB S STL BLNT FILL W/O

## (undated) DEVICE — SYR LR LCK 1ML GRAD NSAF 30ML --

## (undated) DEVICE — [HIGH FLOW INSUFFLATOR,  DO NOT USE IF PACKAGE IS DAMAGED,  KEEP DRY,  KEEP AWAY FROM SUNLIGHT,  PROTECT FROM HEAT AND RADIOACTIVE SOURCES.]: Brand: PNEUMOSURE

## (undated) DEVICE — CLIP APPLIER: Brand: ENDO CLIP II

## (undated) DEVICE — BLADELESS OPTICAL TROCAR WITH FIXATION CANNULA: Brand: VERSAPORT

## (undated) DEVICE — SYR 50ML LR LCK 1ML GRAD NSAF --

## (undated) DEVICE — 2, DISPOSABLE SUCTION/IRRIGATOR WITHOUT DISPOSABLE TIP: Brand: STRYKEFLOW

## (undated) DEVICE — RIMMED SPEED PIN 45MM STERILE

## (undated) DEVICE — CURETTE BNE CEM 10IN DISP --

## (undated) DEVICE — BANDAGE COMPR SELF ADH 5 YDX4 IN TAN STRL PREMIERPRO LF

## (undated) DEVICE — BIPOLAR SEALER 23-112-1 AQM 6.0: Brand: AQUAMANTYS ®

## (undated) DEVICE — BLADELESS OPTICAL TROCAR WITH FIXATION CANNULA: Brand: VERSAONE

## (undated) DEVICE — SUT ETHBND 0 30IN SH GRN --

## (undated) DEVICE — Device

## (undated) DEVICE — TOTAL 1-LAYER TRAY, LATEX FOLEY, 16FR 10: Brand: MEDLINE

## (undated) DEVICE — DRAPE,TOP,102X53,STERILE: Brand: MEDLINE

## (undated) DEVICE — SEALER TISS L45CM DIA5MM ARTC ADV BPLR STR TIP LAP APPRCH

## (undated) DEVICE — SOLUTION IV DEXTROSE/SALINE 5%-0.9% 500ML - 500ML

## (undated) DEVICE — T4 HOOD

## (undated) DEVICE — KENDALL SCD EXPRESS SLEEVES, KNEE LENGTH, LARGE: Brand: KENDALL SCD

## (undated) DEVICE — YANKAUER,BULB TIP,W/O VENT,RIGID,STERILE: Brand: MEDLINE

## (undated) DEVICE — 2000CC GUARDIAN II: Brand: GUARDIAN

## (undated) DEVICE — PACK PROCEDURE SURG TOT KNEE

## (undated) DEVICE — BLADE SAW LG BNE 90X19X1.27 MM PARL STRYKR NS EZ BLADE DISP

## (undated) DEVICE — TROCAR ENDOSCP L100MM DIA15MM BLDELSS STBL SL ENDOPATH XCEL

## (undated) DEVICE — AIRLIFE™ OXYGEN TUBING 7 FEET (2.1 M) CRUSH RESISTANT OXYGEN TUBING, VINYL TIPPED: Brand: AIRLIFE™

## (undated) DEVICE — SUTURE MCRYL SZ 2-0 L27IN ABSRB UD CP-1 1 L36MM 1/2 CIR REV Y266H

## (undated) DEVICE — (D)PREP SKN CHLRAPRP APPL 26ML -- CONVERT TO ITEM 371833

## (undated) DEVICE — SINGLE PORT MANIFOLD: Brand: NEPTUNE 2

## (undated) DEVICE — POUCH SPEC RETRV SHFT 15MM 13X23CM GRN POLYUR DBL RNG HNDL

## (undated) DEVICE — Z DISCONTINUED USE 2744636  DRESSING AQUACEL 14 IN ALG W3.5XL14IN POLYUR FLM CVR W/ HYDRCOLL

## (undated) DEVICE — SOLUTION IV 1000ML 0.9% SOD CHL

## (undated) DEVICE — Device: Brand: STABLECUT®

## (undated) DEVICE — SUTURE ETHBND EXCEL SZ 2 L30IN NONABSORBABLE GRN L40MM V-37 MX69G

## (undated) DEVICE — CONTAINER SPEC HISTOLOGY 900ML POLYPR

## (undated) DEVICE — TRAY PREP DRY W/ PREM GLV 2 APPL 6 SPNG 2 UNDPD 1 OVERWRAP

## (undated) DEVICE — STERILE PRESSURE PROTECTOR PAD® FOR DE MAYO UNIVERSAL DISTRACTOR® (10/CASE): Brand: DE MAYO UNIVERSAL DISTRACTOR®

## (undated) DEVICE — HANDPIECE SET WITH COAXIAL HIGH FLOW TIP AND SUCTION TUBE: Brand: INTERPULSE

## (undated) DEVICE — SINGLE BASIN: Brand: CARDINAL HEALTH

## (undated) DEVICE — NON RIMMED SPEED PIN 65MM STERILE

## (undated) DEVICE — DERMABOND SKIN ADH 0.7ML -- DERMABOND ADVANCED 12/BX

## (undated) DEVICE — HYPODERMIC SAFETY NEEDLE: Brand: MAGELLAN

## (undated) DEVICE — PREP SKN CHLRAPRP APL 26ML STR --

## (undated) DEVICE — BUTTON SWITCH PENCIL BLADE ELECTRODE, HOLSTER: Brand: EDGE

## (undated) DEVICE — SUTURE MCRYL SZ 4-0 L27IN ABSRB UD L19MM PS-2 1/2 CIR PRIM Y426H

## (undated) DEVICE — Device: Brand: JELCO

## (undated) DEVICE — APPLICATOR BNDG 1MM ADH PREMIERPRO EXOFIN

## (undated) DEVICE — CONNECTOR TBNG OD5-7MM O2 END DISP

## (undated) DEVICE — RELOAD STPL H4.1X2MM DIA60MM THCK TISS GRN 6 ROW PWR GST B

## (undated) DEVICE — 3000CC GUARDIAN II: Brand: GUARDIAN

## (undated) DEVICE — LAP CHOLE: Brand: MEDLINE INDUSTRIES, INC.